# Patient Record
Sex: MALE | Race: WHITE | NOT HISPANIC OR LATINO | Employment: OTHER | URBAN - METROPOLITAN AREA
[De-identification: names, ages, dates, MRNs, and addresses within clinical notes are randomized per-mention and may not be internally consistent; named-entity substitution may affect disease eponyms.]

---

## 2019-09-06 ENCOUNTER — TELEPHONE (OUTPATIENT)
Dept: GASTROENTEROLOGY | Facility: CLINIC | Age: 74
End: 2019-09-06

## 2019-09-06 NOTE — TELEPHONE ENCOUNTER
CALLED AND LVM FOR PT TO BRING ANY RECORDS WITH HIM TO APT, OR TO CALL BACK IF HE KNOWS THE NAME OF HIS PREVIOUS GI SO WE CAN RECEIVE RECORDS

## 2019-09-09 ENCOUNTER — TELEPHONE (OUTPATIENT)
Dept: GASTROENTEROLOGY | Facility: AMBULARY SURGERY CENTER | Age: 74
End: 2019-09-09

## 2019-09-09 ENCOUNTER — CONSULT (OUTPATIENT)
Dept: GASTROENTEROLOGY | Facility: AMBULARY SURGERY CENTER | Age: 74
End: 2019-09-09
Payer: COMMERCIAL

## 2019-09-09 VITALS
HEIGHT: 68 IN | DIASTOLIC BLOOD PRESSURE: 60 MMHG | TEMPERATURE: 98.2 F | BODY MASS INDEX: 26.28 KG/M2 | RESPIRATION RATE: 18 BRPM | SYSTOLIC BLOOD PRESSURE: 116 MMHG | HEART RATE: 92 BPM | WEIGHT: 173.4 LBS

## 2019-09-09 DIAGNOSIS — K21.9 GASTROESOPHAGEAL REFLUX DISEASE, ESOPHAGITIS PRESENCE NOT SPECIFIED: ICD-10-CM

## 2019-09-09 DIAGNOSIS — K22.70 BARRETT'S ESOPHAGUS WITHOUT DYSPLASIA: Primary | ICD-10-CM

## 2019-09-09 PROCEDURE — 99204 OFFICE O/P NEW MOD 45 MIN: CPT | Performed by: INTERNAL MEDICINE

## 2019-09-09 RX ORDER — LEVOTHYROXINE SODIUM 175 UG/1
175 TABLET ORAL EVERY MORNING
COMMUNITY

## 2019-09-09 RX ORDER — LANOLIN ALCOHOL/MO/W.PET/CERES
CREAM (GRAM) TOPICAL DAILY
COMMUNITY
End: 2019-11-06 | Stop reason: ALTCHOICE

## 2019-09-09 RX ORDER — UREA 10 %
LOTION (ML) TOPICAL DAILY
COMMUNITY

## 2019-09-09 RX ORDER — MELATONIN
1000 DAILY
COMMUNITY
End: 2020-09-11

## 2019-09-09 RX ORDER — RANITIDINE 150 MG/1
150 CAPSULE ORAL 2 TIMES DAILY
COMMUNITY
End: 2019-11-06

## 2019-09-09 RX ORDER — LOSARTAN POTASSIUM 25 MG/1
12.5 TABLET ORAL EVERY MORNING
COMMUNITY

## 2019-09-09 NOTE — LETTER
September 9, 2019     Eduardo Souza, [address]  [facility] [address]    Patient: Ap Tony   YOB: 1945   Date of Visit: 9/9/2019       Dear Dr Josie Dhillon: Thank you for referring Ap Tony to me for evaluation  Below are my notes for this consultation  If you have questions, please do not hesitate to call me  I look forward to following your patient along with you  Sincerely,        Cristina Mendoza MD        CC: No Recipients  Cristina Mendoza MD  9/9/2019 11:02 AM  Sign at close encounter  Consultation - [facility]  Ap Tony 69 y o  male MRN: [id number]  Unit/Bed#:  Encounter: [id number]        Consults    ASSESSMENT/PLAN:       1  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 which was notable for Grimaldo's with atypia without definite dysplasia  -he is due for repeat EGD  If the repeat EGD is notable for dysplasia, will need RFA  -due to history of osteopenia, bone loss, patient can unfortunately not be on a PPI  Will continue Zantac 150 mg b i d  -continue to follow anti-reflux measures  - Patient was explained about the lifestyle and dietary modifications  Advised to avoid fatty foods, chocolates, caffeine, alcohol and any other triggering foods  Avoid eating for at least 3 hours before going to bed           ______________________________________________________________________    Reason for Consult / Principal Problem: [unfilled]    HPI: Ap Tony is a 69y o  year old male with history of Grimaldo's esophagus, GERD, bone density loss, scoliosis, is referred from the [place] for evaluation of Grimaldo's esophagus  Patient is currently taking Zantac 150 mg b i d   Patient reports having had an EGD in June of 2019 which was notable for C4M2 Grimaldo's a  Biopsies were notable for atypia without definite dysplasia  He was therefore recommended to follow-up with us    Patient states that he has been on Zantac 150 mg b i d  For the past 5 years, prior to that he had been on PPI for at least 10 years  He denies dysphagia, hematemesis, coffee-ground emesis or melena  No unintentional weight loss  Patient states that he is up-to-date with colonoscopy which was last year  Review of Systems: The remainder of the review of systems was negative except for the pertinent positives noted in HPI  Historical Information   Past Medical History:   Diagnosis Date    Grimaldo esophagus     Decreased bone density     Hypertension     Osteopenia     Scoliosis     Thyroid disease      Past Surgical History:   Procedure Laterality Date    THYROIDECTOMY       Social History   Social History     Substance and Sexual Activity   Alcohol Use Yes    Comment: 1 glass with dinner     Social History     Substance and Sexual Activity   Drug Use Never     Social History     Tobacco Use   Smoking Status Never Smoker   Smokeless Tobacco Never Used     History reviewed  No pertinent family history  Meds/Allergies       (Not in a hospital admission)  No current facility-administered medications for this visit  Allergies not on file    Objective     Blood pressure 116/60, pulse 92, temperature 98 2 °F (36 8 °C), temperature source Tympanic, resp  rate 18, height 5' 8" (1 727 m), weight 78 7 kg (173 lb 6 4 oz)  [unfilled]    PHYSICAL EXAM     GEN: well nourished, well developed, no acute distress  HEENT: anicteric, MMM, no cervical or supraclavicular lymphadenopathy  CV: RRR, no m/r/g  CHEST: CTA b/l, no WRR  ABD: +BS, soft, NT/ND, no hepatosplenomegaly  EXT: no c/c/e  SKIN: no rashes,  NEURO: aaox3    Lab Results:   No visits with results within 1 Day(s) from this visit  Latest known visit with results is:   No results found for any previous visit       Imaging Studies: I have personally reviewed pertinent films in PACS

## 2019-09-09 NOTE — PROGRESS NOTES
Consultation - [facility]  Suraj Grove 69 y o  male MRN: [id number]  Unit/Bed#:  Encounter: [id number]        Consults    ASSESSMENT/PLAN:       1  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 which was notable for Grimaldo's with atypia without definite dysplasia  -he is due for repeat EGD  If the repeat EGD is notable for dysplasia, will need RFA  -due to history of osteopenia, bone loss, patient can unfortunately not be on a PPI  Will continue Zantac 150 mg b i d  -continue to follow anti-reflux measures  - Patient was explained about the lifestyle and dietary modifications  Advised to avoid fatty foods, chocolates, caffeine, alcohol and any other triggering foods  Avoid eating for at least 3 hours before going to bed           ______________________________________________________________________    Reason for Consult / Principal Problem: [unfilled]    HPI: Suraj Grove is a 69y o  year old male with history of Grimaldo's esophagus, GERD, bone density loss, scoliosis, is referred from the [address] [place] [address] for evaluation of Grimaldo's esophagus  Patient is currently taking Zantac 150 mg b i d   Patient reports having had an EGD in June of 2019 which was notable for C4M2 Grimaldo's a  Biopsies were notable for atypia without definite dysplasia  He was therefore recommended to follow-up with us  Patient states that he has been on Zantac 150 mg b i d  For the past 5 years, prior to that he had been on PPI for at least 10 years  He denies dysphagia, hematemesis, coffee-ground emesis or melena  No unintentional weight loss  Patient states that he is up-to-date with colonoscopy which was last year  Review of Systems: The remainder of the review of systems was negative except for the pertinent positives noted in HPI       Historical Information   Past Medical History:   Diagnosis Date    Grimaldo esophagus     Decreased bone density     Hypertension     Osteopenia     Scoliosis     Thyroid disease      Past Surgical History:   Procedure Laterality Date    THYROIDECTOMY       Social History   Social History     Substance and Sexual Activity   Alcohol Use Yes    Comment: 1 glass with dinner     Social History     Substance and Sexual Activity   Drug Use Never     Social History     Tobacco Use   Smoking Status Never Smoker   Smokeless Tobacco Never Used     History reviewed  No pertinent family history  Meds/Allergies       (Not in a hospital admission)  No current facility-administered medications for this visit  Allergies not on file    Objective     Blood pressure 116/60, pulse 92, temperature 98 2 °F (36 8 °C), temperature source Tympanic, resp  rate 18, height 5' 8" (1 727 m), weight 78 7 kg (173 lb 6 4 oz)  [unfilled]    PHYSICAL EXAM     GEN: well nourished, well developed, no acute distress  HEENT: anicteric, MMM, no cervical or supraclavicular lymphadenopathy  CV: RRR, no m/r/g  CHEST: CTA b/l, no WRR  ABD: +BS, soft, NT/ND, no hepatosplenomegaly  EXT: no c/c/e  SKIN: no rashes,  NEURO: aaox3    Lab Results:   No visits with results within 1 Day(s) from this visit  Latest known visit with results is:   No results found for any previous visit       Imaging Studies: I have personally reviewed pertinent films in PACS

## 2019-10-23 ENCOUNTER — ANESTHESIA EVENT (OUTPATIENT)
Dept: GASTROENTEROLOGY | Facility: AMBULARY SURGERY CENTER | Age: 74
End: 2019-10-23

## 2019-11-05 NOTE — ANESTHESIA PREPROCEDURE EVALUATION
Review of Systems/Medical History  Patient summary reviewed  Chart reviewed  No history of anesthetic complications     Cardiovascular  Hypertension ,    Pulmonary  Smoker ex-smoker  , No recent URI ,        GI/Hepatic    GERD well controlled, Esophageal disease hamilton esophagus,             Endo/Other  History of thyroid disease , hypothyroidism,      GYN       Hematology   Musculoskeletal       Neurology  Negative neurology ROS      Psychology           Physical Exam    Airway    Mallampati score: I  TM Distance: >3 FB  Neck ROM: full     Dental   lower dentures and upper dentures,     Cardiovascular  Rhythm: regular,     Pulmonary  Breath sounds clear to auscultation,     Other Findings        Anesthesia Plan  ASA Score- 2     Anesthesia Type- IV sedation with anesthesia with ASA Monitors  Additional Monitors:   Airway Plan:         Plan Factors-    Induction- intravenous  Postoperative Plan-     Informed Consent- Anesthetic plan and risks discussed with patient  I personally reviewed this patient with the CRNA  Discussed and agreed on the Anesthesia Plan with the CRNA  Frank Deluca

## 2019-11-06 ENCOUNTER — HOSPITAL ENCOUNTER (OUTPATIENT)
Dept: GASTROENTEROLOGY | Facility: AMBULARY SURGERY CENTER | Age: 74
Setting detail: OUTPATIENT SURGERY
Discharge: HOME | End: 2019-11-06
Attending: INTERNAL MEDICINE
Payer: OTHER GOVERNMENT

## 2019-11-06 ENCOUNTER — TELEPHONE (OUTPATIENT)
Dept: GASTROENTEROLOGY | Facility: CLINIC | Age: 74
End: 2019-11-06

## 2019-11-06 ENCOUNTER — ANESTHESIA (OUTPATIENT)
Dept: GASTROENTEROLOGY | Facility: AMBULARY SURGERY CENTER | Age: 74
End: 2019-11-06

## 2019-11-06 VITALS
OXYGEN SATURATION: 98 % | DIASTOLIC BLOOD PRESSURE: 58 MMHG | BODY MASS INDEX: 26.07 KG/M2 | HEIGHT: 68 IN | RESPIRATION RATE: 18 BRPM | TEMPERATURE: 97 F | HEART RATE: 69 BPM | WEIGHT: 172 LBS | SYSTOLIC BLOOD PRESSURE: 103 MMHG

## 2019-11-06 DIAGNOSIS — K22.70 BARRETT'S ESOPHAGUS WITHOUT DYSPLASIA: ICD-10-CM

## 2019-11-06 DIAGNOSIS — K22.70 BARRETT'S ESOPHAGUS WITHOUT DYSPLASIA: Primary | ICD-10-CM

## 2019-11-06 PROCEDURE — 43239 EGD BIOPSY SINGLE/MULTIPLE: CPT | Performed by: INTERNAL MEDICINE

## 2019-11-06 PROCEDURE — 88305 TISSUE EXAM BY PATHOLOGIST: CPT | Performed by: PATHOLOGY

## 2019-11-06 RX ORDER — PROPOFOL 10 MG/ML
INJECTION, EMULSION INTRAVENOUS AS NEEDED
Status: DISCONTINUED | OUTPATIENT
Start: 2019-11-06 | End: 2019-11-06 | Stop reason: SURG

## 2019-11-06 RX ORDER — FAMOTIDINE 20 MG/1
20 TABLET, FILM COATED ORAL 2 TIMES DAILY
Qty: 60 TABLET | Refills: 11 | Status: SHIPPED | OUTPATIENT
Start: 2019-11-06

## 2019-11-06 RX ORDER — SODIUM CHLORIDE, SODIUM LACTATE, POTASSIUM CHLORIDE, CALCIUM CHLORIDE 600; 310; 30; 20 MG/100ML; MG/100ML; MG/100ML; MG/100ML
125 INJECTION, SOLUTION INTRAVENOUS CONTINUOUS
Status: DISCONTINUED | OUTPATIENT
Start: 2019-11-06 | End: 2019-11-10 | Stop reason: HOSPADM

## 2019-11-06 RX ORDER — OMEPRAZOLE 20 MG/1
20 CAPSULE, DELAYED RELEASE ORAL EVERY OTHER DAY
Qty: 30 CAPSULE | Refills: 5 | Status: SHIPPED | OUTPATIENT
Start: 2019-11-06 | End: 2020-09-14 | Stop reason: SDUPTHER

## 2019-11-06 RX ORDER — LIDOCAINE HYDROCHLORIDE 10 MG/ML
INJECTION, SOLUTION INFILTRATION; PERINEURAL AS NEEDED
Status: DISCONTINUED | OUTPATIENT
Start: 2019-11-06 | End: 2019-11-06 | Stop reason: SURG

## 2019-11-06 RX ADMIN — PROPOFOL 30 MG: 10 INJECTION, EMULSION INTRAVENOUS at 08:16

## 2019-11-06 RX ADMIN — SODIUM CHLORIDE, SODIUM LACTATE, POTASSIUM CHLORIDE, AND CALCIUM CHLORIDE 125 ML/HR: .6; .31; .03; .02 INJECTION, SOLUTION INTRAVENOUS at 07:45

## 2019-11-06 RX ADMIN — PROPOFOL 20 MG: 10 INJECTION, EMULSION INTRAVENOUS at 08:13

## 2019-11-06 RX ADMIN — PROPOFOL 100 MG: 10 INJECTION, EMULSION INTRAVENOUS at 08:08

## 2019-11-06 RX ADMIN — LIDOCAINE HYDROCHLORIDE 50 MG: 10 INJECTION, SOLUTION INFILTRATION; PERINEURAL at 08:08

## 2019-11-06 RX ADMIN — PROPOFOL 30 MG: 10 INJECTION, EMULSION INTRAVENOUS at 08:11

## 2019-11-06 NOTE — TELEPHONE ENCOUNTER
Patients GI provider:  Dr Shadi Alberts    Number to return call: [phone]    Reason for call: Pt calling requesting for a clarification on omeprazole and fax script to [place] Dr Sarahy Minor [phone]    Scheduled procedure/appointment date if applicable: Apt/procedure 11/6/2019

## 2019-11-06 NOTE — ANESTHESIA POSTPROCEDURE EVALUATION
Post-Op Assessment Note    CV Status:  Stable  Pain Score: 0    Pain management: adequate     Mental Status:  Alert and awake   Hydration Status:  Euvolemic   PONV Controlled:  Controlled   Airway Patency:  Patent   Post Op Vitals Reviewed: Yes      Staff: Anesthesiologist           /57 (11/06/19 0825)    Temp (!) 97 °F (36 1 °C) (11/06/19 0825)    Pulse (!) 54 (11/06/19 0825)   Resp 14 (11/06/19 0825)    SpO2 98 % (11/06/19 0825)

## 2019-11-06 NOTE — H&P
History and Physical - [facility]  Lupe Blount 69 y o  male MRN: [id number]    HPI: Lupe Blount is a 69y o  year old male who presents for evaluation of hamilton's  Review of Systems    Historical Information   Past Medical History:   Diagnosis Date    Hamilton esophagus     Colon polyp     Decreased bone density     Hypertension     Kyphosis     Osteopenia     Osteoporosis     Scoliosis     Seasonal allergies     Thyroid disease      Past Surgical History:   Procedure Laterality Date    COLONOSCOPY      COLONOSCOPY W/ POLYPECTOMY      THYROIDECTOMY      TONSILLECTOMY       Social History   Social History     Substance and Sexual Activity   Alcohol Use Yes    Alcohol/week: 7 0 standard drinks    Types: 7 Glasses of wine per week    Frequency: 2-4 times a month    Comment: 1 glass with dinner     Social History     Substance and Sexual Activity   Drug Use Yes    Frequency: 4 0 times per week    Types: Marijuana    Comment: smokes joints     Social History     Tobacco Use   Smoking Status Former Smoker    Types: Cigarettes    Last attempt to quit:     Years since quittin 8   Smokeless Tobacco Never Used     Family History   Problem Relation Age of Onset    Colon cancer Father        Meds/Allergies       (Not in a hospital admission)    No Known Allergies    Objective     /70   Pulse 62   Temp (!) 97 °F (36 1 °C) (Temporal)   Resp 20   Ht 5' 8" (1 727 m)   Wt 78 kg (172 lb)   SpO2 98%   BMI 26 15 kg/m²       PHYSICAL EXAM    Gen: NAD  CV: RRR  CHEST: Clear  ABD: soft, NT/ND  EXT: no edema  Neuro: AAO      ASSESSMENT/PLAN:  This is a 69y o  year old male here for evaluation of hamilton's  PLAN:   Procedure: EGD

## 2019-11-06 NOTE — TELEPHONE ENCOUNTER
Clarified doses with patient  He is take omeprazole 20mg every other day and famotidine 20mg BID  Prescriptions need to be printed and faxed to Dr Socrates Panchal [phone]   Please assist

## 2019-11-06 NOTE — ANESTHESIA POSTPROCEDURE EVALUATION
Post-Op Assessment Note    CV Status:  Stable    Pain management: adequate     Mental Status:  Alert and awake   Hydration Status:  Euvolemic   PONV Controlled:  Controlled   Airway Patency:  Patent   Post Op Vitals Reviewed: Yes      Staff: CRNA           /58 (11/06/19 0838)    Temp (!) 97 °F (36 1 °C) (11/06/19 0838)    Pulse 69 (11/06/19 0838)   Resp 18 (11/06/19 0838)    SpO2 98 % (11/06/19 6954)

## 2019-11-13 ENCOUNTER — TELEPHONE (OUTPATIENT)
Dept: GASTROENTEROLOGY | Facility: CLINIC | Age: 74
End: 2019-11-13

## 2019-11-13 NOTE — TELEPHONE ENCOUNTER
lmom asking patient to contact the office for results   Letter sent   EGD recall placed   Apt recall placed

## 2019-11-13 NOTE — LETTER
November 13, 2019     Dalton Minor  [phone]      Dear Mr Carolyn Luna: Our office has attempted to call you in regards to your results, however, we have been unable to get a hold of you  Please give our office a call so we can review your results and answer any questions you may have in regards to your recent EGD Biopsies                 Sincerely,        [facility]

## 2019-11-13 NOTE — TELEPHONE ENCOUNTER
----- Message from Beny Calzada MD sent at 11/12/2019  5:28 PM EST -----  Please call the patient and inform him that esophageal biopsies only show Grimaldo's esophagus  There is no evidence of dysplasia on any of the biopsies  Would recommend that he take PPI every other day alternating with H2 blocker which he was taking  He will need a repeat EGD in 6 months  Please have him follow-up in the office in the next 2-3 months

## 2019-11-21 ENCOUNTER — TELEPHONE (OUTPATIENT)
Dept: GASTROENTEROLOGY | Facility: AMBULARY SURGERY CENTER | Age: 74
End: 2019-11-21

## 2019-11-21 NOTE — TELEPHONE ENCOUNTER
Returned pt's call re: pt needs to schedule office appt due in Feb 2020-at that time repeat egd can be scheduled due May 2020 per result note

## 2020-01-22 ENCOUNTER — TELEPHONE (OUTPATIENT)
Dept: GASTROENTEROLOGY | Facility: AMBULARY SURGERY CENTER | Age: 75
End: 2020-01-22

## 2020-01-22 NOTE — TELEPHONE ENCOUNTER
Called patient, spoke with wife, Juanito Sesay  He has an appointment scheduled on 2/18 with Rosalinda Cabral at 10am and she is not doing appointments that day  Told her to have him call in to reschedule the visit

## 2020-02-05 ENCOUNTER — TELEPHONE (OUTPATIENT)
Dept: GASTROENTEROLOGY | Facility: AMBULARY SURGERY CENTER | Age: 75
End: 2020-02-05

## 2020-02-07 NOTE — TELEPHONE ENCOUNTER
Pt called, he is working on this, he has been in contact with [facility] and is just waiting to hear if extension is approved

## 2020-02-13 ENCOUNTER — OFFICE VISIT (OUTPATIENT)
Dept: GASTROENTEROLOGY | Facility: AMBULARY SURGERY CENTER | Age: 75
End: 2020-02-13
Payer: COMMERCIAL

## 2020-02-13 VITALS
TEMPERATURE: 98.9 F | SYSTOLIC BLOOD PRESSURE: 110 MMHG | WEIGHT: 174 LBS | HEART RATE: 70 BPM | DIASTOLIC BLOOD PRESSURE: 60 MMHG | RESPIRATION RATE: 16 BRPM | HEIGHT: 68 IN | BODY MASS INDEX: 26.37 KG/M2

## 2020-02-13 DIAGNOSIS — K22.70 BARRETT'S ESOPHAGUS WITHOUT DYSPLASIA: Primary | ICD-10-CM

## 2020-02-13 DIAGNOSIS — Z80.0 FAMILY HISTORY OF COLON CANCER IN FATHER: ICD-10-CM

## 2020-02-13 PROCEDURE — 99213 OFFICE O/P EST LOW 20 MIN: CPT | Performed by: PHYSICIAN ASSISTANT

## 2020-02-13 NOTE — PROGRESS NOTES
[facility] - Outpatient Follow-up Note  Josefina Sanches 69 y o  male MRN: [id number]  Encounter: [id number]    ASSESSMENT AND PLAN:      Barretts esophagus   -EGD in  showed nondysplastic barretts  -plan for repeat EGD 2020  -continue regimen of omeprazole every other day and famotidine    Family history of colon cancer  -due for recall   ______________________________________________________________________    SUBJECTIVE:  Pleasant 70 yo male with a history of GERD and  barretts esophagus presents for EGD follow up  EGD showed non dysplastic short segment barretts  He actually has never had significant gerd symptoms, and is doing well now he takes omeprazole 20mg every other day and famotidine daily  No dysphagia, abdominal pain    REVIEW OF SYSTEMS IS OTHERWISE NEGATIVE      Historical Information   Past Medical History:   Diagnosis Date    Grimaldo esophagus     Colon polyp     Decreased bone density     Hypertension     Kyphosis     Osteopenia     Osteoporosis     Scoliosis     Seasonal allergies     Thyroid disease      Past Surgical History:   Procedure Laterality Date    COLONOSCOPY      COLONOSCOPY W/ POLYPECTOMY      THYROIDECTOMY      TONSILLECTOMY       Social History   Social History     Substance and Sexual Activity   Alcohol Use Yes    Alcohol/week: 7 0 standard drinks    Types: 7 Glasses of wine per week    Frequency: 2-4 times a month    Comment: 1 glass with dinner     Social History     Substance and Sexual Activity   Drug Use Yes    Frequency: 4 0 times per week    Types: Marijuana    Comment: smokes joints     Social History     Tobacco Use   Smoking Status Former Smoker    Types: Cigarettes    Last attempt to quit:     Years since quittin 1   Smokeless Tobacco Never Used     Family History   Problem Relation Age of Onset    Colon cancer Father        Meds/Allergies       Current Outpatient Medications:     calcium carbonate (OS-JACOB) 1250 (500 Ca) MG chewable tablet    cholecalciferol (VITAMIN D3) 1,000 units tablet    Cyanocobalamin 1000 MCG/ML KIT    famotidine (PEPCID) 20 mg tablet    levothyroxine 175 mcg tablet    losartan (COZAAR) 25 mg tablet    omeprazole (PriLOSEC) 20 mg delayed release capsule    No Known Allergies        Objective     Blood pressure 110/60, pulse 70, temperature 98 9 °F (37 2 °C), temperature source Tympanic, resp  rate 16, height 5' 8" (1 727 m), weight 78 9 kg (174 lb)  Body mass index is 26 46 kg/m²  PHYSICAL EXAM:    General Appearance:   Alert, cooperative, no distress   HEENT:   Normocephalic, atraumatic, anicteric      Neck:  Supple, symmetrical, trachea midline   Lungs:   Clear to auscultation bilaterally; no rales, rhonchi or wheezing; respirations unlabored    Heart[de-identified]   Regular rate and rhythm; no murmur, rub, or gallop  Abdomen:   Soft, non-tender, non-distended; normal bowel sounds; no masses, no organomegaly                              Lab Results:   No visits with results within 1 Day(s) from this visit     Latest known visit with results is:   Hospital Outpatient Visit on 11/06/2019   Component Date Value    Case Report 11/06/2019                      Value:Surgical Pathology Report                         Case: [id number]                                   Authorizing Provider:  Trevon Orr MD       Collected:           11/06/2019 0810              Ordering Location[facility]     [facility]        Received:            11/06/2019 [facility]                                                           Pathologist:           Dmitry Hester DO                                                      Specimens:   A) - Esophagus, esophagus at 36 cm                                                                  B) - Esophagus, esophagus at 34 cm                                                                  C) - Esophagus, esophagus at 32 cm                                                                  D) - Esophagus, esophagus at 31 cm                                                         Final Diagnosis 11/06/2019                      Value: This result contains rich text formatting which cannot be displayed here   Note 11/06/2019                      Value: This result contains rich text formatting which cannot be displayed here   Additional Information 11/06/2019                      Value: This result contains rich text formatting which cannot be displayed here  [facility] Gross Description 11/06/2019                      Value: This result contains rich text formatting which cannot be displayed here  Radiology Results:   No results found

## 2020-03-30 ENCOUNTER — TELEPHONE (OUTPATIENT)
Dept: GASTROENTEROLOGY | Facility: CLINIC | Age: 75
End: 2020-03-30

## 2020-06-26 ENCOUNTER — PREP FOR PROCEDURE (OUTPATIENT)
Dept: GASTROENTEROLOGY | Facility: AMBULARY SURGERY CENTER | Age: 75
End: 2020-06-26

## 2020-06-26 DIAGNOSIS — Z80.0 FAMILY HISTORY OF COLON CANCER IN FATHER: Primary | ICD-10-CM

## 2020-06-26 DIAGNOSIS — Z20.822 ENCOUNTER FOR LABORATORY TESTING FOR COVID-19 VIRUS: Primary | ICD-10-CM

## 2020-06-30 DIAGNOSIS — Z80.0 FAMILY HISTORY OF COLON CANCER IN FATHER: ICD-10-CM

## 2020-07-01 DIAGNOSIS — Z20.822 ENCOUNTER FOR LABORATORY TESTING FOR COVID-19 VIRUS: ICD-10-CM

## 2020-07-01 PROCEDURE — U0003 INFECTIOUS AGENT DETECTION BY NUCLEIC ACID (DNA OR RNA); SEVERE ACUTE RESPIRATORY SYNDROME CORONAVIRUS 2 (SARS-COV-2) (CORONAVIRUS DISEASE [COVID-19]), AMPLIFIED PROBE TECHNIQUE, MAKING USE OF HIGH THROUGHPUT TECHNOLOGIES AS DESCRIBED BY CMS-2020-01-R: HCPCS | Performed by: OBSTETRICS & GYNECOLOGY

## 2020-07-06 ENCOUNTER — ANESTHESIA EVENT (OUTPATIENT)
Dept: GASTROENTEROLOGY | Facility: HOSPITAL | Age: 75
End: 2020-07-06

## 2020-07-06 ENCOUNTER — HOSPITAL ENCOUNTER (OUTPATIENT)
Dept: GASTROENTEROLOGY | Facility: HOSPITAL | Age: 75
Setting detail: OUTPATIENT SURGERY
Discharge: HOME | End: 2020-07-06
Attending: INTERNAL MEDICINE | Admitting: INTERNAL MEDICINE
Payer: COMMERCIAL

## 2020-07-06 ENCOUNTER — ANESTHESIA (OUTPATIENT)
Dept: GASTROENTEROLOGY | Facility: HOSPITAL | Age: 75
End: 2020-07-06

## 2020-07-06 VITALS
SYSTOLIC BLOOD PRESSURE: 112 MMHG | HEART RATE: 58 BPM | WEIGHT: 157 LBS | HEIGHT: 68 IN | TEMPERATURE: 96.2 F | BODY MASS INDEX: 23.79 KG/M2 | DIASTOLIC BLOOD PRESSURE: 57 MMHG | OXYGEN SATURATION: 100 % | RESPIRATION RATE: 17 BRPM

## 2020-07-06 DIAGNOSIS — K22.70 BARRETT'S ESOPHAGUS WITHOUT DYSPLASIA: ICD-10-CM

## 2020-07-06 DIAGNOSIS — Z80.0 FAMILY HISTORY OF COLON CANCER IN FATHER: ICD-10-CM

## 2020-07-06 DIAGNOSIS — K21.9 GASTROESOPHAGEAL REFLUX DISEASE, ESOPHAGITIS PRESENCE NOT SPECIFIED: ICD-10-CM

## 2020-07-06 PROCEDURE — 43239 EGD BIOPSY SINGLE/MULTIPLE: CPT | Performed by: INTERNAL MEDICINE

## 2020-07-06 PROCEDURE — 88341 IMHCHEM/IMCYTCHM EA ADD ANTB: CPT | Performed by: PATHOLOGY

## 2020-07-06 PROCEDURE — 88342 IMHCHEM/IMCYTCHM 1ST ANTB: CPT | Performed by: PATHOLOGY

## 2020-07-06 PROCEDURE — 45380 COLONOSCOPY AND BIOPSY: CPT | Performed by: INTERNAL MEDICINE

## 2020-07-06 PROCEDURE — NC001 PR NO CHARGE: Performed by: INTERNAL MEDICINE

## 2020-07-06 PROCEDURE — 88305 TISSUE EXAM BY PATHOLOGIST: CPT | Performed by: PATHOLOGY

## 2020-07-06 RX ORDER — FENTANYL CITRATE 50 UG/ML
INJECTION, SOLUTION INTRAMUSCULAR; INTRAVENOUS AS NEEDED
Status: DISCONTINUED | OUTPATIENT
Start: 2020-07-06 | End: 2020-07-06 | Stop reason: SURG

## 2020-07-06 RX ORDER — GLYCOPYRROLATE 0.2 MG/ML
INJECTION INTRAMUSCULAR; INTRAVENOUS AS NEEDED
Status: DISCONTINUED | OUTPATIENT
Start: 2020-07-06 | End: 2020-07-06 | Stop reason: SURG

## 2020-07-06 RX ORDER — LIDOCAINE HYDROCHLORIDE 10 MG/ML
INJECTION, SOLUTION EPIDURAL; INFILTRATION; INTRACAUDAL; PERINEURAL AS NEEDED
Status: DISCONTINUED | OUTPATIENT
Start: 2020-07-06 | End: 2020-07-06 | Stop reason: SURG

## 2020-07-06 RX ORDER — PROPOFOL 10 MG/ML
INJECTION, EMULSION INTRAVENOUS CONTINUOUS PRN
Status: DISCONTINUED | OUTPATIENT
Start: 2020-07-06 | End: 2020-07-06 | Stop reason: SURG

## 2020-07-06 RX ORDER — SODIUM CHLORIDE, SODIUM LACTATE, POTASSIUM CHLORIDE, CALCIUM CHLORIDE 600; 310; 30; 20 MG/100ML; MG/100ML; MG/100ML; MG/100ML
INJECTION, SOLUTION INTRAVENOUS CONTINUOUS PRN
Status: DISCONTINUED | OUTPATIENT
Start: 2020-07-06 | End: 2020-07-06 | Stop reason: SURG

## 2020-07-06 RX ORDER — SODIUM CHLORIDE, SODIUM LACTATE, POTASSIUM CHLORIDE, CALCIUM CHLORIDE 600; 310; 30; 20 MG/100ML; MG/100ML; MG/100ML; MG/100ML
100 INJECTION, SOLUTION INTRAVENOUS CONTINUOUS
Status: DISCONTINUED | OUTPATIENT
Start: 2020-07-06 | End: 2020-07-10 | Stop reason: HOSPADM

## 2020-07-06 RX ORDER — PROPOFOL 10 MG/ML
INJECTION, EMULSION INTRAVENOUS AS NEEDED
Status: DISCONTINUED | OUTPATIENT
Start: 2020-07-06 | End: 2020-07-06 | Stop reason: SURG

## 2020-07-06 RX ADMIN — PHENYLEPHRINE HYDROCHLORIDE 100 MCG: 10 INJECTION INTRAVENOUS at 09:35

## 2020-07-06 RX ADMIN — PHENYLEPHRINE HYDROCHLORIDE 100 MCG: 10 INJECTION INTRAVENOUS at 09:53

## 2020-07-06 RX ADMIN — PROPOFOL 50 MG: 10 INJECTION, EMULSION INTRAVENOUS at 09:05

## 2020-07-06 RX ADMIN — FENTANYL CITRATE 25 MCG: 50 INJECTION INTRAMUSCULAR; INTRAVENOUS at 09:05

## 2020-07-06 RX ADMIN — PROPOFOL 50 MG: 10 INJECTION, EMULSION INTRAVENOUS at 09:06

## 2020-07-06 RX ADMIN — LIDOCAINE HYDROCHLORIDE 100 MG: 10 INJECTION, SOLUTION EPIDURAL; INFILTRATION; INTRACAUDAL; PERINEURAL at 09:05

## 2020-07-06 RX ADMIN — PHENYLEPHRINE HYDROCHLORIDE 100 MCG: 10 INJECTION INTRAVENOUS at 09:17

## 2020-07-06 RX ADMIN — PHENYLEPHRINE HYDROCHLORIDE 100 MCG: 10 INJECTION INTRAVENOUS at 09:47

## 2020-07-06 RX ADMIN — PROPOFOL 100 MCG/KG/MIN: 10 INJECTION, EMULSION INTRAVENOUS at 09:05

## 2020-07-06 RX ADMIN — SODIUM CHLORIDE, SODIUM LACTATE, POTASSIUM CHLORIDE, AND CALCIUM CHLORIDE: .6; .31; .03; .02 INJECTION, SOLUTION INTRAVENOUS at 09:02

## 2020-07-06 RX ADMIN — PHENYLEPHRINE HYDROCHLORIDE 100 MCG: 10 INJECTION INTRAVENOUS at 09:27

## 2020-07-06 RX ADMIN — PHENYLEPHRINE HYDROCHLORIDE 100 MCG: 10 INJECTION INTRAVENOUS at 09:40

## 2020-07-06 RX ADMIN — GLYCOPYRROLATE 0.2 MG: 0.2 INJECTION, SOLUTION INTRAMUSCULAR; INTRAVENOUS at 09:05

## 2020-07-06 RX ADMIN — PHENYLEPHRINE HYDROCHLORIDE 100 MCG: 10 INJECTION INTRAVENOUS at 09:23

## 2020-07-06 NOTE — ANESTHESIA PREPROCEDURE EVALUATION
Review of Systems/Medical History  Patient summary reviewed  Chart reviewed  No history of anesthetic complications     Cardiovascular  Exercise tolerance (METS): >4,  Hypertension ,    Pulmonary  Smoker (Regular marijuana use) ,        GI/Hepatic    GERD well controlled, Esophageal disease hamilton esophagus,   Comment: 1 drink EtOH daily          Endo/Other  History of thyroid disease , hypothyroidism,      GYN       Hematology   Musculoskeletal       Neurology   Psychology           Physical Exam    Airway    Mallampati score: I  TM Distance: >3 FB  Neck ROM: full     Dental   upper dentures and lower dentures,     Cardiovascular      Pulmonary      Other Findings        Anesthesia Plan  ASA Score- 2     Anesthesia Type- IV sedation with anesthesia with ASA Monitors  Additional Monitors:   Airway Plan:         Plan Factors-    Induction- intravenous  Postoperative Plan-     Informed Consent- Anesthetic plan and risks discussed with patient  I personally reviewed this patient with the CRNA  Discussed and agreed on the Anesthesia Plan with the CRNA  Daniel Antonio

## 2020-07-06 NOTE — H&P
History and Physical - [facility]  Corinna Estrada 77 y o  male MRN: [id number]    HPI: Corinna Estrada is a 77y o  year old male who presents for evaluation of Grimaldo's and colon cancer screening  Review of Systems    Historical Information   Past Medical History:   Diagnosis Date    Grimaldo esophagus     Colon polyp     Decreased bone density     Disease of thyroid gland     Hyperlipidemia     Hypertension     Kyphosis     Osteopenia     Osteoporosis     Scoliosis     Seasonal allergies     Thyroid disease      Past Surgical History:   Procedure Laterality Date    COLONOSCOPY      COLONOSCOPY W/ POLYPECTOMY      THYROIDECTOMY      TONSILLECTOMY       Social History   Social History     Substance and Sexual Activity   Alcohol Use Yes    Alcohol/week: 7 0 standard drinks    Types: 7 Glasses of wine per week    Frequency: 2-4 times a month    Comment: 1 glass with dinner     Social History     Substance and Sexual Activity   Drug Use Yes    Frequency: 4 0 times per week    Types: Marijuana    Comment: smokes joints     Social History     Tobacco Use   Smoking Status Former Smoker    Types: Cigarettes    Last attempt to quit:     Years since quittin 5   Smokeless Tobacco Never Used     Family History   Problem Relation Age of Onset    Colon cancer Father        Meds/Allergies       (Not in a hospital admission)    No Known Allergies    Objective     /76   Pulse 68   Temp (!) 97 2 °F (36 2 °C) (Temporal)   Resp 20   Ht 5' 8" (1 727 m)   Wt 71 2 kg (157 lb)   SpO2 94%   BMI 23 87 kg/m²       PHYSICAL EXAM    Gen: NAD  CV: RRR  CHEST: Clear  ABD: soft, NT/ND  EXT: no edema  Neuro: AAO      ASSESSMENT/PLAN:  This is a 77y o  year old male here for Grimaldo's and colon cancer screening  PLAN:   Procedure:  EGD and colonoscopy

## 2020-07-06 NOTE — ANESTHESIA POSTPROCEDURE EVALUATION
Post-Op Assessment Note    CV Status:  Stable  Pain Score: 0    Pain management: adequate     Mental Status:  Sleepy and arousable   Hydration Status:  Euvolemic   PONV Controlled:  Controlled   Airway Patency:  Patent and adequate   Post Op Vitals Reviewed: Yes      Staff: CRNA, Anesthesiologist           BP   82/49   Temp  96 1   Pulse  56   Resp   18   SpO2   100%

## 2020-07-08 LAB — SARS-COV-2 RNA SPEC QL NAA+PROBE: NOT DETECTED

## 2020-08-06 ENCOUNTER — TELEPHONE (OUTPATIENT)
Dept: GASTROENTEROLOGY | Facility: AMBULARY SURGERY CENTER | Age: 75
End: 2020-08-06

## 2020-08-06 NOTE — RESULT ENCOUNTER NOTE
Len-I called this pt and documented a phone encounter-I had to leave a message for him to return my call  This pt will need to be scheduled w/ dr Italo Paul (dr camp does not do RFA's yet) @ [facility]   FYI

## 2020-08-06 NOTE — TELEPHONE ENCOUNTER
Lm for pt to call back re: scheduling RFA (needs to be scheduled w/ dr Leida Carmona) @ [place] [phone]

## 2020-08-07 ENCOUNTER — TELEPHONE (OUTPATIENT)
Dept: GASTROENTEROLOGY | Facility: AMBULARY SURGERY CENTER | Age: 75
End: 2020-08-07

## 2020-08-07 NOTE — TELEPHONE ENCOUNTER
Spoke to pt   Pt is scheduled w/ dr Wilmer Morrow at [place] [address] for egd w/ RFA/Ketty from [employer] aware/COVID TEST ORDERED FOR 9/8/2020/PT AWARE

## 2020-09-08 DIAGNOSIS — Z11.59 SCREENING FOR VIRAL DISEASE: ICD-10-CM

## 2020-09-08 PROCEDURE — U0003 INFECTIOUS AGENT DETECTION BY NUCLEIC ACID (DNA OR RNA); SEVERE ACUTE RESPIRATORY SYNDROME CORONAVIRUS 2 (SARS-COV-2) (CORONAVIRUS DISEASE [COVID-19]), AMPLIFIED PROBE TECHNIQUE, MAKING USE OF HIGH THROUGHPUT TECHNOLOGIES AS DESCRIBED BY CMS-2020-01-R: HCPCS | Performed by: INTERNAL MEDICINE

## 2020-09-09 LAB — SARS-COV-2 RNA SPEC QL NAA+PROBE: NOT DETECTED

## 2020-09-11 RX ORDER — SILDENAFIL 100 MG/1
TABLET, FILM COATED ORAL AS NEEDED
COMMUNITY
Start: 2020-08-20

## 2020-09-11 RX ORDER — MULTIVIT-MIN/IRON/FOLIC ACID/K 18-600-40
2000 CAPSULE ORAL 2 TIMES DAILY
COMMUNITY

## 2020-09-11 NOTE — PRE-PROCEDURE INSTRUCTIONS
Pre-Surgery Instructions:   Medication Instructions    ATORVASTATIN CALCIUM PO Patient was instructed by Physician and understands   calcium carbonate (OS-JACOB) 1250 (500 Ca) MG chewable tablet Patient was instructed by Physician and understands   Cholecalciferol (Vitamin D) 50 MCG (2000 UT) CAPS Patient was instructed by Physician and understands   famotidine (PEPCID) 20 mg tablet Patient was instructed by Physician and understands   levothyroxine 175 mcg tablet Patient was instructed by Physician and understands   losartan (COZAAR) 25 mg tablet Patient was instructed by Physician and understands   omeprazole (PriLOSEC) 20 mg delayed release capsule Patient was instructed by Physician and understands   sildenafil (VIAGRA) 100 mg tablet Patient was instructed by Physician and understands  Pt to follow Dr Glory Vincent instructions  Pt will take his medications when he gets home   wife Eugenia Kong

## 2020-09-14 ENCOUNTER — ANESTHESIA EVENT (OUTPATIENT)
Dept: GASTROENTEROLOGY | Facility: AMBULARY SURGERY CENTER | Age: 75
End: 2020-09-14

## 2020-09-14 ENCOUNTER — HOSPITAL ENCOUNTER (OUTPATIENT)
Dept: GASTROENTEROLOGY | Facility: AMBULARY SURGERY CENTER | Age: 75
Setting detail: OUTPATIENT SURGERY
Discharge: HOME | End: 2020-09-14
Attending: INTERNAL MEDICINE
Payer: COMMERCIAL

## 2020-09-14 ENCOUNTER — ANESTHESIA (OUTPATIENT)
Dept: GASTROENTEROLOGY | Facility: AMBULARY SURGERY CENTER | Age: 75
End: 2020-09-14

## 2020-09-14 VITALS
RESPIRATION RATE: 18 BRPM | OXYGEN SATURATION: 99 % | DIASTOLIC BLOOD PRESSURE: 68 MMHG | HEART RATE: 50 BPM | TEMPERATURE: 95.7 F | WEIGHT: 157 LBS | BODY MASS INDEX: 23.79 KG/M2 | HEIGHT: 68 IN | SYSTOLIC BLOOD PRESSURE: 140 MMHG

## 2020-09-14 VITALS — HEART RATE: 63 BPM

## 2020-09-14 DIAGNOSIS — K21.9 GASTROESOPHAGEAL REFLUX DISEASE, ESOPHAGITIS PRESENCE NOT SPECIFIED: ICD-10-CM

## 2020-09-14 DIAGNOSIS — K22.70 BARRETT'S ESOPHAGUS WITHOUT DYSPLASIA: ICD-10-CM

## 2020-09-14 PROBLEM — I10 HTN (HYPERTENSION): Status: ACTIVE | Noted: 2020-09-14

## 2020-09-14 PROBLEM — E78.5 HYPERLIPIDEMIA: Status: ACTIVE | Noted: 2020-09-14

## 2020-09-14 PROCEDURE — 43270 EGD LESION ABLATION: CPT | Performed by: INTERNAL MEDICINE

## 2020-09-14 PROCEDURE — C1726 CATH, BAL DIL, NON-VASCULAR: HCPCS

## 2020-09-14 PROCEDURE — C1769 GUIDE WIRE: HCPCS

## 2020-09-14 RX ORDER — OMEPRAZOLE 20 MG/1
40 CAPSULE, DELAYED RELEASE ORAL 2 TIMES DAILY
Qty: 60 CAPSULE | Refills: 5 | Status: SHIPPED | OUTPATIENT
Start: 2020-09-14

## 2020-09-14 RX ORDER — PROPOFOL 10 MG/ML
INJECTION, EMULSION INTRAVENOUS CONTINUOUS PRN
Status: DISCONTINUED | OUTPATIENT
Start: 2020-09-14 | End: 2020-09-14

## 2020-09-14 RX ORDER — LIDOCAINE HYDROCHLORIDE 10 MG/ML
INJECTION, SOLUTION EPIDURAL; INFILTRATION; INTRACAUDAL; PERINEURAL AS NEEDED
Status: DISCONTINUED | OUTPATIENT
Start: 2020-09-14 | End: 2020-09-14

## 2020-09-14 RX ORDER — SUCRALFATE ORAL 1 G/10ML
1 SUSPENSION ORAL 4 TIMES DAILY
Qty: 420 ML | Refills: 0 | Status: SHIPPED | OUTPATIENT
Start: 2020-09-14 | End: 2021-11-17

## 2020-09-14 RX ORDER — LIDOCAINE HYDROCHLORIDE 20 MG/ML
15 SOLUTION OROPHARYNGEAL 4 TIMES DAILY PRN
Qty: 300 ML | Refills: 3 | Status: SHIPPED | OUTPATIENT
Start: 2020-09-14 | End: 2021-11-17

## 2020-09-14 RX ORDER — FENTANYL CITRATE 50 UG/ML
INJECTION, SOLUTION INTRAMUSCULAR; INTRAVENOUS AS NEEDED
Status: DISCONTINUED | OUTPATIENT
Start: 2020-09-14 | End: 2020-09-14

## 2020-09-14 RX ORDER — SODIUM CHLORIDE, SODIUM LACTATE, POTASSIUM CHLORIDE, CALCIUM CHLORIDE 600; 310; 30; 20 MG/100ML; MG/100ML; MG/100ML; MG/100ML
125 INJECTION, SOLUTION INTRAVENOUS CONTINUOUS
Status: CANCELLED | OUTPATIENT
Start: 2020-09-14

## 2020-09-14 RX ORDER — SODIUM CHLORIDE, SODIUM LACTATE, POTASSIUM CHLORIDE, CALCIUM CHLORIDE 600; 310; 30; 20 MG/100ML; MG/100ML; MG/100ML; MG/100ML
INJECTION, SOLUTION INTRAVENOUS CONTINUOUS PRN
Status: DISCONTINUED | OUTPATIENT
Start: 2020-09-14 | End: 2020-09-14

## 2020-09-14 RX ORDER — PROPOFOL 10 MG/ML
INJECTION, EMULSION INTRAVENOUS AS NEEDED
Status: DISCONTINUED | OUTPATIENT
Start: 2020-09-14 | End: 2020-09-14

## 2020-09-14 RX ADMIN — FENTANYL CITRATE 25 MCG: 50 INJECTION, SOLUTION INTRAMUSCULAR; INTRAVENOUS at 09:19

## 2020-09-14 RX ADMIN — SODIUM CHLORIDE, SODIUM LACTATE, POTASSIUM CHLORIDE, AND CALCIUM CHLORIDE: .6; .31; .03; .02 INJECTION, SOLUTION INTRAVENOUS at 08:50

## 2020-09-14 RX ADMIN — PROPOFOL 100 MG: 10 INJECTION, EMULSION INTRAVENOUS at 08:54

## 2020-09-14 RX ADMIN — LIDOCAINE HYDROCHLORIDE 50 MG: 10 INJECTION, SOLUTION EPIDURAL; INFILTRATION; INTRACAUDAL; PERINEURAL at 08:54

## 2020-09-14 RX ADMIN — FENTANYL CITRATE 25 MCG: 50 INJECTION, SOLUTION INTRAMUSCULAR; INTRAVENOUS at 09:00

## 2020-09-14 RX ADMIN — PROPOFOL 160 MCG/KG/MIN: 10 INJECTION, EMULSION INTRAVENOUS at 08:54

## 2020-09-14 NOTE — ANESTHESIA PREPROCEDURE EVALUATION
Procedure:  EGD    Relevant Problems   CARDIO   (+) HTN (hypertension)   (+) Hyperlipidemia      Other   (+) Grimaldo's esophagus without dysplasia        Physical Exam    Airway    Mallampati score: II  TM Distance: >3 FB  Neck ROM: full     Dental   upper dentures and lower dentures,     Cardiovascular  Rhythm: regular, Rate: normal, Cardiovascular exam normal    Pulmonary  Pulmonary exam normal     Other Findings        Anesthesia Plan  ASA Score- 2     Anesthesia Type- IV sedation with anesthesia with ASA Monitors  Additional Monitors:   Airway Plan:           Plan Factors-Exercise tolerance (METS): >4 METS  Chart reviewed  Patient summary reviewed  Patient is not a current smoker  Patient did not smoke on day of surgery  Induction- intravenous  Postoperative Plan-     Informed Consent- Anesthetic plan and risks discussed with patient  I personally reviewed this patient with the CRNA  Discussed and agreed on the Anesthesia Plan with the CRNA  Rosalino Roblero

## 2020-09-14 NOTE — H&P
History and Physical -  [facility]  Florian Galindo 77 y o  male MRN: [id number]    HPI: Florian Galindo is a 77y o  year old male who presents with long segment barretts with indeterminate dysplasia       Review of Systems    Historical Information   Past Medical History:   Diagnosis Date    Grimaldo esophagus     Colon polyp     Decreased bone density     Disease of thyroid gland     hyperthyroid-goiter-thyroidectomy    Erectile dysfunction     Full dentures     Hyperlipidemia     Hypertension     Kyphosis     Osteopenia     Osteoporosis     Scoliosis     Seasonal allergies     Spondylosis     Wears glasses      Past Surgical History:   Procedure Laterality Date    COLONOSCOPY      PARATHYROID GLAND SURGERY      THYROIDECTOMY      TONSILLECTOMY       Social History   Social History     Substance and Sexual Activity   Alcohol Use Yes    Alcohol/week: 7 0 standard drinks    Types: 7 Glasses of wine per week    Frequency: 4 or more times a week    Comment: 1 glass with dinner     Social History     Substance and Sexual Activity   Drug Use Yes    Frequency: 4 0 times per week    Types: Marijuana    Comment: smokes joints     Social History     Tobacco Use   Smoking Status Former Smoker    Types: Cigarettes    Last attempt to quit:     Years since quittin 7   Smokeless Tobacco Never Used     Family History   Problem Relation Age of Onset    Colon cancer Father     Cancer Father         colon    Heart disease Father     Thyroid disease Mother         goiter    Other Sister         something like "alzheimers"    Cancer Paternal Aunt         colon    Thyroid disease Sister        Meds/Allergies     (Not in a hospital admission)      No Known Allergies    Objective     /70   Pulse 58   Temp (!) 95 7 °F (35 4 °C) (Tympanic)   Resp 18   Ht 5' 8" (1 727 m)   Wt 71 2 kg (157 lb)   SpO2 100%   BMI 23 87 kg/m²       PHYSICAL EXAM    Gen: NAD  CV: RRR  CHEST: Clear  ABD: soft, NT/ND  EXT: no edema  Neuro: AAO      ASSESSMENT/PLAN:  This is a 77y o  year old male here for  long segment barretts with indeterminate dysplasia         PLAN:   Procedure: Seven Franco

## 2020-09-14 NOTE — ANESTHESIA POSTPROCEDURE EVALUATION
Post-Op Assessment Note    CV Status:  Stable  Pain Score: 0    Pain management: adequate     Mental Status:  Alert and awake   Hydration Status:  Stable and euvolemic   PONV Controlled:  Controlled   Airway Patency:  Patent and adequate      Post Op Vitals Reviewed: Yes      Staff: CRNA         No complications documented      BP   118/59   Temp     Pulse  58   Resp   20   SpO2   99

## 2021-02-13 ENCOUNTER — IMMUNIZATIONS (OUTPATIENT)
Dept: FAMILY MEDICINE CLINIC | Facility: HOSPITAL | Age: 76
End: 2021-02-13

## 2021-02-13 DIAGNOSIS — Z23 ENCOUNTER FOR IMMUNIZATION: Primary | ICD-10-CM

## 2021-02-13 PROCEDURE — 0001A SARS-COV-2 / COVID-19 MRNA VACCINE (PFIZER-BIONTECH) 30 MCG: CPT

## 2021-02-13 PROCEDURE — 91300 SARS-COV-2 / COVID-19 MRNA VACCINE (PFIZER-BIONTECH) 30 MCG: CPT

## 2021-03-06 ENCOUNTER — IMMUNIZATIONS (OUTPATIENT)
Dept: FAMILY MEDICINE CLINIC | Facility: HOSPITAL | Age: 76
End: 2021-03-06

## 2021-03-06 DIAGNOSIS — Z23 ENCOUNTER FOR IMMUNIZATION: Primary | ICD-10-CM

## 2021-03-06 PROCEDURE — 0002A SARS-COV-2 / COVID-19 MRNA VACCINE (PFIZER-BIONTECH) 30 MCG: CPT

## 2021-03-06 PROCEDURE — 91300 SARS-COV-2 / COVID-19 MRNA VACCINE (PFIZER-BIONTECH) 30 MCG: CPT

## 2021-08-09 ENCOUNTER — TELEPHONE (OUTPATIENT)
Dept: GASTROENTEROLOGY | Facility: AMBULARY SURGERY CENTER | Age: 76
End: 2021-08-09

## 2021-08-09 ENCOUNTER — TELEPHONE (OUTPATIENT)
Dept: GASTROENTEROLOGY | Facility: CLINIC | Age: 76
End: 2021-08-09

## 2021-08-09 NOTE — TELEPHONE ENCOUNTER
Patients GI provider:  Dr Steve Menjivar    Number to return call: [phone]    Reason for call: [facility]      Scheduled procedure/appointment date if applicable: N/A

## 2021-09-16 ENCOUNTER — OFFICE VISIT (OUTPATIENT)
Dept: GASTROENTEROLOGY | Facility: CLINIC | Age: 76
End: 2021-09-16
Payer: OTHER GOVERNMENT

## 2021-09-16 VITALS
SYSTOLIC BLOOD PRESSURE: 134 MMHG | BODY MASS INDEX: 24.58 KG/M2 | HEIGHT: 68 IN | HEART RATE: 66 BPM | WEIGHT: 162.2 LBS | DIASTOLIC BLOOD PRESSURE: 74 MMHG | TEMPERATURE: 97.2 F

## 2021-09-16 DIAGNOSIS — K22.70 LONG-SEGMENT BARRETT'S ESOPHAGUS: Primary | ICD-10-CM

## 2021-09-16 PROCEDURE — 99214 OFFICE O/P EST MOD 30 MIN: CPT | Performed by: PHYSICIAN ASSISTANT

## 2021-09-16 NOTE — PROGRESS NOTES
Follow-up Note -  [facility]  Ap Tony 1945 77 y o  male         Reason:   Follow-up; Grimaldo's esophagus    HPI:   52-year-old male with history of osteoporosis, hypertension and hyperlipidemia who presents for follow-up; he last saw our service when he underwent EGD with ablation of Grimaldo's 9/2020  He was recommended for repeat ablation in 3 months, but it appears he did not follow up  Last colonoscopy was in July 2020 showing 4 diminutive colon polyps for which he was recommended repeat colonoscopy in 3 years  (  He also has family history of colon cancer)  Grimaldo's was last biopsied at the time of EGD from July 2020, noting a Z-line about 7 centimeters from top of the gastric folds, for columnar tongue is were noted  Biopsies were done and 1 biopsy was indefinite for dysplasia, with the rest consistent for Grimaldo's without dysplasia  patient reports he is not having any heartburn, although he says he never really did have heartburn; he said that with his reflux he would get sinus congestion  He continues to take omeprazole 40 mg twice daily and also Pepcid twice daily  He does report history of osteoporosis  He denies any dysphagia, denies any particularly recent loss of weight although he thinks his weight has generally trended down over the last several years  Denies any nausea or vomiting, denies any concerning changes to his bowel habits      REVIEW OF SYSTEMS:      CONSTITUTIONAL: Denies any fever, chills, or rigors  Good appetite, and no recent weight loss  HEENT: No earache or tinnitus  Denies hearing loss or visual disturbances  CARDIOVASCULAR: No chest pain or palpitations  RESPIRATORY: Denies any cough, hemoptysis, shortness of breath or dyspnea on exertion  GASTROINTESTINAL: As noted in the History of Present Illness  GENITOURINARY: No problems with urination  Denies any hematuria or dysuria    NEUROLOGIC: No dizziness or vertigo, denies headaches  MUSCULOSKELETAL: Denies any muscle or joint pain  SKIN: Denies skin rashes or itching  ENDOCRINE: Denies excessive thirst  Denies intolerance to heat or cold  PSYCHOSOCIAL: Denies depression or anxiety  Denies any recent memory loss  Past Medical History:   Diagnosis Date    Grimaldo esophagus     Colon polyp     Decreased bone density     Disease of thyroid gland     hyperthyroid-goiter-thyroidectomy    Erectile dysfunction     Full dentures     Hyperlipidemia     Hypertension     Kyphosis     Osteopenia     Osteoporosis     Scoliosis     Seasonal allergies     Spondylosis     Wears glasses       Past Surgical History:   Procedure Laterality Date    COLONOSCOPY      PARATHYROID GLAND SURGERY      THYROIDECTOMY      TONSILLECTOMY       Social History     Socioeconomic History    Marital status: /Civil Union     Spouse name: Not on file    Number of children: Not on file    Years of education: Not on file    Highest education level: Not on file   Occupational History    Not on file   Tobacco Use    Smoking status: Former Smoker     Types: Cigarettes     Quit date:      Years since quittin     Smokeless tobacco: Never Used   Substance and Sexual Activity    Alcohol use: Yes     Alcohol/week: 7 0 standard drinks     Types: 7 Glasses of wine per week     Comment: 1 glass with dinner    Drug use: Yes     Frequency: 4 0 times per week     Types: Marijuana     Comment: smokes joints    Sexual activity: Not on file   Other Topics Concern    Not on file   Social History Narrative    Not on file     Social Determinants of Health     Financial Resource Strain:     Difficulty of Paying Living Expenses:    Food Insecurity:     Worried About Running Out of Food in the Last Year:     Ran Out of Food in the Last Year:    Transportation Needs:     Lack of Transportation (Medical):      Lack of Transportation (Non-Medical):    Physical Activity:     Days of Exercise per Week:     Minutes of Exercise per Session:    Stress:     Feeling of Stress :    Social Connections:     Frequency of Communication with Friends and Family:     Frequency of Social Gatherings with Friends and Family:     Attends Mu-ism Services:     Active Member of Clubs or Organizations:     Attends Club or Organization Meetings:     Marital Status:    Intimate Partner Violence:     Fear of Current or Ex-Partner:     Emotionally Abused:     Physically Abused:     Sexually Abused:      Family History   Problem Relation Age of Onset    Colon cancer Father     Cancer Father         colon    Heart disease Father     Thyroid disease Mother         goiter    Other Sister         something like "alzheimers"    Cancer Paternal Aunt         colon    Thyroid disease Sister      Patient has no known allergies    Current Outpatient Medications   Medication Sig Dispense Refill    ATORVASTATIN CALCIUM PO Take by mouth 1/2 tab daily      calcium carbonate (OS-JACOB) 1250 (500 Ca) MG chewable tablet Chew daily      Cholecalciferol (Vitamin D) 50 MCG (2000 UT) CAPS Take 2,000 Units by mouth 2 (two) times a day      Cyanocobalamin 1000 MCG/ML KIT Inject as directed every 14 (fourteen) days      famotidine (PEPCID) 20 mg tablet Take 1 tablet (20 mg total) by mouth 2 (two) times a day 60 tablet 11    levothyroxine 175 mcg tablet Take 175 mcg by mouth every morning       Lidocaine Viscous HCl (XYLOCAINE) 2 % mucosal solution Swish and spit 15 mL 4 (four) times a day as needed for mouth pain or discomfort 300 mL 3    losartan (COZAAR) 25 mg tablet Take 12 5 mg by mouth every morning       omeprazole (PriLOSEC) 20 mg delayed release capsule Take 2 capsules (40 mg total) by mouth 2 (two) times a day 60 capsule 5    sildenafil (VIAGRA) 100 mg tablet as needed      sucralfate (CARAFATE) 1 g/10 mL suspension Take 10 mL (1 g total) by mouth 4 (four) times a day (Patient not taking: Reported on 9/16/2021) 420 mL 0     No current facility-administered medications for this visit  Blood pressure 134/74, pulse 66, temperature (!) 97 2 °F (36 2 °C), temperature source Temporal, height 5' 8" (1 727 m), weight 73 6 kg (162 lb 3 2 oz)  PHYSICAL EXAM:      General Appearance:   Alert, cooperative, no distress, appears stated age    HEENT:   Normocephalic, atraumatic, anicteric      Neck:  Supple, symmetrical, trachea midline, no adenopathy;    thyroid: no enlargement/tenderness/nodules; no carotid  bruit or JVD    Lungs:   Clear to auscultation bilaterally; no rales, rhonchi or wheezing; respirations unlabored    Heart[de-identified]   S1 and S2 normal; regular rate and rhythm; no murmur, rub, or gallop  Abdomen:   Soft, non-tender, non-distended; normal bowel sounds; no masses, no organomegaly    Extremities: No edema, erythema, wounds, rashes   Rectal:   Deferred                      No results found for: WBC, HGB, HCT, MCV, PLT  No results found for: GLUCOSE, CALCIUM, NA, K, CO2, CL, BUN, CREATININE  No results found for: ALT, AST, GGT, ALKPHOS, BILITOT  No results found for: INR, PROTIME    EGD Barrx/RFA    Result Date: 9/14/2020  Impression: Grimaldo's esophagus ablated using ablation balloon  Ten joules in 2 applications  Relations for application  RECOMMENDATION: Discharged home Recommend full liquid diet today, followed dietary recommendations as per protocol Recommend omeprazole 40 mg twice daily, Carafate 4 times daily before meals for the next 2 weeks Viscous lidocaine as needed for pain Liquid codeine as needed for pain Repeat ablation in 3 months Call with any abdominal pain, bleeding, fevers  Kumar Yeh MD      ASSESSMENT & PLAN:    Long-segment Grimaldo's esophagus   No alarm symptoms at this time, the patient had long segment Grimaldo's esophagus with 1 biopsy that was indefinite for dysplasia, other biopsies showed nondysplastic Grimaldo's    He underwent a single ablation treatment in September 2020, was recommended for follow-up in 3 months from that time but reports he had difficulty following up after that point and does has not had any repeat treatments      - discussed with Dr Nathalia Mckeon, will plan for EGD with RFA     - procedure was explained in detail to the patient at this time including associated risks and benefits, risks including but not limited to infection, perforation bleeding     -continue acid reducers at current dosing in the meantime     -advised patient regarding dietary lifestyle modification strategies for the mitigation of GERD

## 2021-09-16 NOTE — ASSESSMENT & PLAN NOTE
No alarm symptoms at this time, the patient had long segment Grimaldo's esophagus with 1 biopsy that was indefinite for dysplasia, other biopsies showed nondysplastic Grimaldo's  He underwent a single ablation treatment in September 2020, was recommended for follow-up in 3 months from that time but reports he had difficulty following up after that point and does has not had any repeat treatments      - discussed with Dr Inga Vasquez, will plan for EGD with RFA     - procedure was explained in detail to the patient at this time including associated risks and benefits, risks including but not limited to infection, perforation bleeding     -continue acid reducers at current dosing in the meantime     -advised patient regarding dietary lifestyle modification strategies for the mitigation of GERD

## 2021-11-12 ENCOUNTER — TELEPHONE (OUTPATIENT)
Dept: PREADMISSION TESTING | Facility: HOSPITAL | Age: 76
End: 2021-11-12

## 2021-11-24 ENCOUNTER — HOSPITAL ENCOUNTER (OUTPATIENT)
Dept: GASTROENTEROLOGY | Facility: AMBULARY SURGERY CENTER | Age: 76
Setting detail: OUTPATIENT SURGERY
Discharge: HOME | End: 2021-11-24
Attending: INTERNAL MEDICINE | Admitting: INTERNAL MEDICINE
Payer: COMMERCIAL

## 2021-11-24 ENCOUNTER — ANESTHESIA (OUTPATIENT)
Dept: GASTROENTEROLOGY | Facility: AMBULARY SURGERY CENTER | Age: 76
End: 2021-11-24

## 2021-11-24 ENCOUNTER — ANESTHESIA EVENT (OUTPATIENT)
Dept: GASTROENTEROLOGY | Facility: AMBULARY SURGERY CENTER | Age: 76
End: 2021-11-24

## 2021-11-24 VITALS
HEIGHT: 68 IN | BODY MASS INDEX: 24.66 KG/M2 | OXYGEN SATURATION: 99 % | RESPIRATION RATE: 20 BRPM | SYSTOLIC BLOOD PRESSURE: 132 MMHG | DIASTOLIC BLOOD PRESSURE: 68 MMHG | HEART RATE: 66 BPM | TEMPERATURE: 96 F

## 2021-11-24 DIAGNOSIS — K22.70 LONG-SEGMENT BARRETT'S ESOPHAGUS: ICD-10-CM

## 2021-11-24 PROCEDURE — 43270 EGD LESION ABLATION: CPT | Performed by: INTERNAL MEDICINE

## 2021-11-24 RX ORDER — ONDANSETRON 2 MG/ML
4 INJECTION INTRAMUSCULAR; INTRAVENOUS ONCE AS NEEDED
Status: CANCELLED | OUTPATIENT
Start: 2021-11-24

## 2021-11-24 RX ORDER — LIDOCAINE HYDROCHLORIDE 20 MG/ML
INJECTION, SOLUTION EPIDURAL; INFILTRATION; INTRACAUDAL; PERINEURAL AS NEEDED
Status: DISCONTINUED | OUTPATIENT
Start: 2021-11-24 | End: 2021-11-24

## 2021-11-24 RX ORDER — PROPOFOL 10 MG/ML
INJECTION, EMULSION INTRAVENOUS AS NEEDED
Status: DISCONTINUED | OUTPATIENT
Start: 2021-11-24 | End: 2021-11-24

## 2021-11-24 RX ORDER — GLYCOPYRROLATE 0.2 MG/ML
INJECTION INTRAMUSCULAR; INTRAVENOUS AS NEEDED
Status: DISCONTINUED | OUTPATIENT
Start: 2021-11-24 | End: 2021-11-24

## 2021-11-24 RX ORDER — SODIUM CHLORIDE, SODIUM LACTATE, POTASSIUM CHLORIDE, CALCIUM CHLORIDE 600; 310; 30; 20 MG/100ML; MG/100ML; MG/100ML; MG/100ML
100 INJECTION, SOLUTION INTRAVENOUS CONTINUOUS
Status: DISCONTINUED | OUTPATIENT
Start: 2021-11-24 | End: 2021-11-28 | Stop reason: HOSPADM

## 2021-11-24 RX ORDER — ACETYLCYSTEINE 200 MG/ML
SOLUTION ORAL; RESPIRATORY (INHALATION) CODE/TRAUMA/SEDATION MEDICATION
Status: COMPLETED | OUTPATIENT
Start: 2021-11-24 | End: 2021-11-24

## 2021-11-24 RX ORDER — PROPOFOL 10 MG/ML
INJECTION, EMULSION INTRAVENOUS CONTINUOUS PRN
Status: DISCONTINUED | OUTPATIENT
Start: 2021-11-24 | End: 2021-11-24

## 2021-11-24 RX ORDER — SUCRALFATE ORAL 1 G/10ML
1 SUSPENSION ORAL 4 TIMES DAILY
Qty: 300 ML | Refills: 1 | Status: SHIPPED | OUTPATIENT
Start: 2021-11-24 | End: 2022-03-01

## 2021-11-24 RX ADMIN — ACETYLCYSTEINE 3 ML: 200 SOLUTION ORAL; RESPIRATORY (INHALATION) at 08:35

## 2021-11-24 RX ADMIN — SODIUM CHLORIDE, SODIUM LACTATE, POTASSIUM CHLORIDE, AND CALCIUM CHLORIDE 100 ML/HR: .6; .31; .03; .02 INJECTION, SOLUTION INTRAVENOUS at 07:44

## 2021-11-24 RX ADMIN — GLYCOPYRROLATE 0.2 MG: 0.2 INJECTION, SOLUTION INTRAMUSCULAR; INTRAVENOUS at 08:07

## 2021-11-24 RX ADMIN — PROPOFOL 130 MCG/KG/MIN: 10 INJECTION, EMULSION INTRAVENOUS at 08:09

## 2021-11-24 RX ADMIN — LIDOCAINE HYDROCHLORIDE 100 MG: 20 INJECTION, SOLUTION EPIDURAL; INFILTRATION; INTRACAUDAL; PERINEURAL at 08:09

## 2021-11-24 RX ADMIN — PROPOFOL 20 MG: 10 INJECTION, EMULSION INTRAVENOUS at 08:19

## 2021-11-24 RX ADMIN — PROPOFOL 100 MG: 10 INJECTION, EMULSION INTRAVENOUS at 08:09

## 2021-12-01 ENCOUNTER — TELEPHONE (OUTPATIENT)
Dept: GASTROENTEROLOGY | Facility: CLINIC | Age: 76
End: 2021-12-01

## 2021-12-06 ENCOUNTER — TELEPHONE (OUTPATIENT)
Dept: GASTROENTEROLOGY | Facility: CLINIC | Age: 76
End: 2021-12-06

## 2022-02-23 ENCOUNTER — TELEPHONE (OUTPATIENT)
Dept: PREADMISSION TESTING | Facility: HOSPITAL | Age: 77
End: 2022-02-23

## 2022-03-01 NOTE — PRE-PROCEDURE INSTRUCTIONS
Pre-Surgery Instructions:   Medication Instructions    al mag oxide-diphenhydramine-lidocaine viscous (MAGIC MOUTHWASH) 1:1:1 suspension Patient was instructed by Physician and understands   ATORVASTATIN CALCIUM PO Patient was instructed by Physician and understands   calcium carbonate (OS-JACOB) 1250 (500 Ca) MG chewable tablet Patient was instructed by Physician and understands   Cholecalciferol (Vitamin D) 50 MCG (2000 UT) CAPS Patient was instructed by Physician and understands   famotidine (PEPCID) 20 mg tablet Patient was instructed by Physician and understands   levothyroxine 175 mcg tablet Instructed patient per Anesthesia Guidelines   losartan (COZAAR) 25 mg tablet Instructed patient per Anesthesia Guidelines   omeprazole (PriLOSEC) 20 mg delayed release capsule Patient was instructed by Physician and understands   sildenafil (VIAGRA) 100 mg tablet Patient was instructed by Physician and understands  Pt to take levothyroxine and losartan a m of procedure, all other a m meds pt to take once back home post procedure

## 2022-03-04 ENCOUNTER — ANESTHESIA (OUTPATIENT)
Dept: GASTROENTEROLOGY | Facility: AMBULARY SURGERY CENTER | Age: 77
End: 2022-03-04

## 2022-03-04 ENCOUNTER — HOSPITAL ENCOUNTER (OUTPATIENT)
Dept: GASTROENTEROLOGY | Facility: AMBULARY SURGERY CENTER | Age: 77
Setting detail: OUTPATIENT SURGERY
Discharge: HOME | End: 2022-03-04
Attending: INTERNAL MEDICINE
Payer: COMMERCIAL

## 2022-03-04 ENCOUNTER — ANESTHESIA EVENT (OUTPATIENT)
Dept: GASTROENTEROLOGY | Facility: AMBULARY SURGERY CENTER | Age: 77
End: 2022-03-04

## 2022-03-04 VITALS
SYSTOLIC BLOOD PRESSURE: 139 MMHG | TEMPERATURE: 96.3 F | OXYGEN SATURATION: 97 % | RESPIRATION RATE: 18 BRPM | HEART RATE: 64 BPM | WEIGHT: 165 LBS | BODY MASS INDEX: 25.09 KG/M2 | DIASTOLIC BLOOD PRESSURE: 57 MMHG

## 2022-03-04 DIAGNOSIS — K22.70 LONG-SEGMENT BARRETT'S ESOPHAGUS: ICD-10-CM

## 2022-03-04 PROCEDURE — 88305 TISSUE EXAM BY PATHOLOGIST: CPT | Performed by: PATHOLOGY

## 2022-03-04 PROCEDURE — 43239 EGD BIOPSY SINGLE/MULTIPLE: CPT | Performed by: INTERNAL MEDICINE

## 2022-03-04 RX ORDER — PROPOFOL 10 MG/ML
INJECTION, EMULSION INTRAVENOUS AS NEEDED
Status: DISCONTINUED | OUTPATIENT
Start: 2022-03-04 | End: 2022-03-04

## 2022-03-04 RX ORDER — SODIUM CHLORIDE, SODIUM LACTATE, POTASSIUM CHLORIDE, CALCIUM CHLORIDE 600; 310; 30; 20 MG/100ML; MG/100ML; MG/100ML; MG/100ML
30 INJECTION, SOLUTION INTRAVENOUS CONTINUOUS
Status: DISPENSED | OUTPATIENT
Start: 2022-03-04 | End: 2022-03-04

## 2022-03-04 RX ORDER — SODIUM CHLORIDE 9 MG/ML
INJECTION, SOLUTION INTRAVENOUS CONTINUOUS PRN
Status: DISCONTINUED | OUTPATIENT
Start: 2022-03-04 | End: 2022-03-04

## 2022-03-04 RX ADMIN — PROPOFOL 200 MG: 10 INJECTION, EMULSION INTRAVENOUS at 09:15

## 2022-03-04 RX ADMIN — PROPOFOL 50 MG: 10 INJECTION, EMULSION INTRAVENOUS at 09:20

## 2022-03-04 RX ADMIN — PROPOFOL 50 MG: 10 INJECTION, EMULSION INTRAVENOUS at 09:18

## 2022-03-04 RX ADMIN — SODIUM CHLORIDE: 9 INJECTION, SOLUTION INTRAVENOUS at 09:11

## 2022-03-04 RX ADMIN — SODIUM CHLORIDE, SODIUM LACTATE, POTASSIUM CHLORIDE, AND CALCIUM CHLORIDE 30 ML/HR: .6; .31; .03; .02 INJECTION, SOLUTION INTRAVENOUS at 08:47

## 2022-03-04 NOTE — ANESTHESIA PREPROCEDURE EVALUATION
Procedure:  EGD    Relevant Problems   CARDIO   (+) HTN (hypertension)   (+) Hyperlipidemia        Physical Exam    Airway    Mallampati score: II  TM Distance: >3 FB  Neck ROM: full     Dental   No notable dental hx     Cardiovascular  Cardiovascular exam normal    Pulmonary  Pulmonary exam normal     Other Findings        Anesthesia Plan  ASA Score- 2     Anesthesia Type- IV sedation with anesthesia with ASA Monitors  Additional Monitors:   Airway Plan:           Plan Factors-Exercise tolerance (METS): >4 METS  Chart reviewed  Imaging results reviewed  Existing labs reviewed  Patient summary reviewed  Patient is not a current smoker  Induction-     Postoperative Plan-     Informed Consent- Anesthetic plan and risks discussed with patient  I personally reviewed this patient with the CRNA  Discussed and agreed on the Anesthesia Plan with the CRNA  Tara Howe

## 2022-03-04 NOTE — ANESTHESIA POSTPROCEDURE EVALUATION
Post-Op Assessment Note    CV Status:  Stable  Pain Score: 0    Pain management: adequate     Mental Status:  Alert and awake   Hydration Status:  Euvolemic   PONV Controlled:  Controlled   Airway Patency:  Patent      Post Op Vitals Reviewed: Yes      Staff: CRNA, Anesthesiologist   Comments: vss        No complications documented      /69   Temp     Pulse  66   Resp  16   SpO2   100

## 2022-03-04 NOTE — H&P
History and Physical -  [facility]  Unknown Veronica 77 y o  male MRN: [id number]    HPI: Unknown Veronica is a 77y o  year old male who presents with Barretts      Review of Systems    Historical Information   Past Medical History:   Diagnosis Date    Grimaldo esophagus     Colon polyp     Decreased bone density     Disease of thyroid gland     hyperthyroid-goiter-thyroidectomy    Erectile dysfunction     Full dentures     GERD (gastroesophageal reflux disease)     Hyperlipidemia     Hypertension     Kyphosis     Osteopenia     Osteoporosis     Scoliosis     Seasonal allergies     Spondylosis     Wears glasses      Past Surgical History:   Procedure Laterality Date    COLONOSCOPY      EGD      RFA x2  and     PARATHYROID GLAND SURGERY      2 removed 2 remain    THYROIDECTOMY      TONSILLECTOMY       Social History   Social History     Substance and Sexual Activity   Alcohol Use Yes    Alcohol/week: 7 0 standard drinks    Types: 7 Glasses of wine per week    Comment: 1 glass with dinner     Social History     Substance and Sexual Activity   Drug Use Yes    Frequency: 4 0 times per week    Types: Marijuana    Comment: smokes joints     Social History     Tobacco Use   Smoking Status Former Smoker    Types: Cigarettes    Quit date:     Years since quittin 1   Smokeless Tobacco Never Used     Family History   Problem Relation Age of Onset    Colon cancer Father     Cancer Father         colon    Heart disease Father     Thyroid disease Mother         goiter    Other Sister         something like "alzheimers"    Cancer Paternal Aunt         colon    Thyroid disease Sister        Meds/Allergies     (Not in a hospital admission)      No Known Allergies    Objective     /72   Pulse 59   Temp (!) 96 3 °F (35 7 °C) (Temporal)   Resp (!) 25   Wt 74 8 kg (165 lb)   SpO2 100%   BMI 25 09 kg/m²       PHYSICAL EXAM    Gen: NAD  CV: RRR  CHEST: Clear  ABD: soft, NT/ND  EXT: no edema  Neuro: AAO      ASSESSMENT/PLAN:  This is a 77y o  year old male here for Barretts    PLAN:   Procedure: egd w Lethea Baumgarten

## 2022-03-21 ENCOUNTER — TELEPHONE (OUTPATIENT)
Dept: GASTROENTEROLOGY | Facility: CLINIC | Age: 77
End: 2022-03-21

## 2022-03-21 NOTE — TELEPHONE ENCOUNTER
Pt aware of results and recommendations  [address] will call in few months to sched repeat 6m EGD  Pt states he has enough meds at this time through the [place]

## 2022-03-21 NOTE — TELEPHONE ENCOUNTER
----- Message from Norm Simpson MD sent at 3/14/2022  9:21 PM EDT -----  Please inform the patient that the biopsies from lower esophagus confirm a short area of Grimaldo's esophagus with no cancers changes  We can continue to monitor this  Continue current medications  I recommend repeat EGD in 6 months time to continue to follow

## 2022-10-18 ENCOUNTER — TELEPHONE (OUTPATIENT)
Dept: GASTROENTEROLOGY | Facility: AMBULARY SURGERY CENTER | Age: 77
End: 2022-10-18

## 2022-10-20 NOTE — TELEPHONE ENCOUNTER
Per Dr Ines Boss colonoscopy report in 2020, he will be due in 2023 for repeat colonoscopy  S firs EGD with RFA, yes I would like him to schedule repeat EGD with me at [facility]   Please schedule this with RFA

## 2022-10-20 NOTE — TELEPHONE ENCOUNTER
Please see Dr Glory Vincent message       Patient has Ara Urbano [id number] valid from 10/07/22 exoires 04/05/2023    Auth and records from [place] scanned into patients chart    Referral is made    Thank you

## 2022-10-28 ENCOUNTER — TELEPHONE (OUTPATIENT)
Dept: GASTROENTEROLOGY | Facility: AMBULARY SURGERY CENTER | Age: 77
End: 2022-10-28

## 2022-10-28 NOTE — TELEPHONE ENCOUNTER
Pt returned my call   Patient is scheduled for EGD w/ RFA on December 20 , 2022 at [facility] with Neena Pinto MD  Patient is aware of pre-procedure prep of Nothing to eat after midnight, day of the procedure clear liquids only and nothing to drink 4 hours prior to the EGD and they will be called the day prior between 2 and 6 pm for time to report for procedure  Pre-procedure prep has been given to the patient  via [address] [facility] mail on October 28, 2022  [facility] VIA EMAIL

## 2022-12-20 ENCOUNTER — ANESTHESIA EVENT (OUTPATIENT)
Dept: GASTROENTEROLOGY | Facility: AMBULARY SURGERY CENTER | Age: 77
End: 2022-12-20

## 2022-12-20 ENCOUNTER — HOSPITAL ENCOUNTER (OUTPATIENT)
Dept: GASTROENTEROLOGY | Facility: AMBULARY SURGERY CENTER | Age: 77
Setting detail: OUTPATIENT SURGERY
Discharge: HOME | End: 2022-12-20
Attending: INTERNAL MEDICINE

## 2022-12-20 ENCOUNTER — ANESTHESIA (OUTPATIENT)
Dept: GASTROENTEROLOGY | Facility: AMBULARY SURGERY CENTER | Age: 77
End: 2022-12-20

## 2022-12-20 VITALS
OXYGEN SATURATION: 97 % | RESPIRATION RATE: 18 BRPM | TEMPERATURE: 97.2 F | DIASTOLIC BLOOD PRESSURE: 70 MMHG | SYSTOLIC BLOOD PRESSURE: 167 MMHG | HEART RATE: 61 BPM

## 2022-12-20 DIAGNOSIS — K22.70 LONG-SEGMENT BARRETT'S ESOPHAGUS: ICD-10-CM

## 2022-12-20 RX ORDER — SODIUM CHLORIDE, SODIUM LACTATE, POTASSIUM CHLORIDE, CALCIUM CHLORIDE 600; 310; 30; 20 MG/100ML; MG/100ML; MG/100ML; MG/100ML
125 INJECTION, SOLUTION INTRAVENOUS CONTINUOUS
Status: DISCONTINUED | OUTPATIENT
Start: 2022-12-20 | End: 2022-12-24 | Stop reason: HOSPADM

## 2022-12-20 RX ORDER — LIDOCAINE HYDROCHLORIDE 10 MG/ML
INJECTION, SOLUTION EPIDURAL; INFILTRATION; INTRACAUDAL; PERINEURAL AS NEEDED
Status: DISCONTINUED | OUTPATIENT
Start: 2022-12-20 | End: 2022-12-20

## 2022-12-20 RX ORDER — SODIUM CHLORIDE, SODIUM LACTATE, POTASSIUM CHLORIDE, CALCIUM CHLORIDE 600; 310; 30; 20 MG/100ML; MG/100ML; MG/100ML; MG/100ML
INJECTION, SOLUTION INTRAVENOUS CONTINUOUS PRN
Status: DISCONTINUED | OUTPATIENT
Start: 2022-12-20 | End: 2022-12-20

## 2022-12-20 RX ORDER — PROPOFOL 10 MG/ML
INJECTION, EMULSION INTRAVENOUS AS NEEDED
Status: DISCONTINUED | OUTPATIENT
Start: 2022-12-20 | End: 2022-12-20

## 2022-12-20 RX ADMIN — PROPOFOL 150 MG: 10 INJECTION, EMULSION INTRAVENOUS at 08:26

## 2022-12-20 RX ADMIN — SODIUM CHLORIDE, SODIUM LACTATE, POTASSIUM CHLORIDE, AND CALCIUM CHLORIDE: .6; .31; .03; .02 INJECTION, SOLUTION INTRAVENOUS at 08:23

## 2022-12-20 RX ADMIN — SODIUM CHLORIDE, POTASSIUM CHLORIDE, SODIUM LACTATE AND CALCIUM CHLORIDE 125 ML/HR: 600; 310; 30; 20 INJECTION, SOLUTION INTRAVENOUS at 08:07

## 2022-12-20 RX ADMIN — PROPOFOL 50 MG: 10 INJECTION, EMULSION INTRAVENOUS at 08:32

## 2022-12-20 RX ADMIN — LIDOCAINE HYDROCHLORIDE 50 MG: 10 INJECTION, SOLUTION EPIDURAL; INFILTRATION; INTRACAUDAL; PERINEURAL at 08:26

## 2022-12-20 NOTE — ANESTHESIA POSTPROCEDURE EVALUATION
Post-Op Assessment Note    CV Status:  Stable  Pain Score: 0    Pain management: adequate     Mental Status:  Alert and awake   Hydration Status:  Stable and euvolemic   PONV Controlled:  Controlled   Airway Patency:  Patent and adequate      Post Op Vitals Reviewed: Yes      Staff: CRNA         No notable events documented      BP (!) 82/47 (12/20/22 0837) 90/50   Temp     Pulse 62 (12/20/22 0837)   Resp 18 (12/20/22 0837)    SpO2 97 % (12/20/22 0837)

## 2022-12-20 NOTE — ANESTHESIA PREPROCEDURE EVALUATION
Procedure:  EGD    Relevant Problems   CARDIO   (+) HTN (hypertension)   (+) Hyperlipidemia        Physical Exam    Airway    Mallampati score: II  TM Distance: >3 FB  Neck ROM: full     Dental   No notable dental hx     Cardiovascular  Cardiovascular exam normal    Pulmonary  Pulmonary exam normal     Other Findings        Anesthesia Plan  ASA Score- 2     Anesthesia Type- IV sedation with anesthesia with ASA Monitors  Additional Monitors:   Airway Plan:           Plan Factors-Exercise tolerance (METS): >4 METS  Chart reviewed  Imaging results reviewed  Existing labs reviewed  Patient summary reviewed  Patient is a current smoker (marijuana)  Induction-     Postoperative Plan-     Informed Consent- Anesthetic plan and risks discussed with patient  I personally reviewed this patient with the CRNA  Discussed and agreed on the Anesthesia Plan with the CRNA  Ludivina Hernandez

## 2023-03-21 ENCOUNTER — OFFICE VISIT (OUTPATIENT)
Dept: URGENT CARE | Facility: MEDICAL CENTER | Age: 78
End: 2023-03-21

## 2023-03-21 ENCOUNTER — APPOINTMENT (OUTPATIENT)
Dept: RADIOLOGY | Facility: MEDICAL CENTER | Age: 78
End: 2023-03-21

## 2023-03-21 VITALS
BODY MASS INDEX: 25.31 KG/M2 | OXYGEN SATURATION: 98 % | SYSTOLIC BLOOD PRESSURE: 131 MMHG | TEMPERATURE: 98.4 F | HEIGHT: 68 IN | HEART RATE: 89 BPM | RESPIRATION RATE: 22 BRPM | DIASTOLIC BLOOD PRESSURE: 63 MMHG | WEIGHT: 167 LBS

## 2023-03-21 DIAGNOSIS — S20.211A CONTUSION OF RIB ON RIGHT SIDE, INITIAL ENCOUNTER: ICD-10-CM

## 2023-03-21 DIAGNOSIS — S20.211A CONTUSION OF RIB ON RIGHT SIDE, INITIAL ENCOUNTER: Primary | ICD-10-CM

## 2023-03-21 RX ORDER — METHOCARBAMOL 500 MG/1
500 TABLET, FILM COATED ORAL
Qty: 20 TABLET | Refills: 0 | Status: SHIPPED | OUTPATIENT
Start: 2023-03-21 | End: 2023-03-26

## 2023-03-21 NOTE — PATIENT INSTRUCTIONS
Contusion rib  Robaxin as directed- may become drowsy  Follow up with PCP in 3-5 days  Proceed to  ER if symptoms worsen

## 2023-03-21 NOTE — PROGRESS NOTES
[address]        NAME: Victoria Arroyo is a 69 y o  male  : 1945    MRN: [id number]  DATE: 2023  TIME: 2:13 PM    Assessment and Plan   Contusion of rib on right side, initial encounter [S20 211A]  1  Contusion of rib on right side, initial encounter  XR ribs right w pa chest min 3 views            Patient Instructions     Contusion rib  Robaxin as directed- may become drowsy  Follow up with PCP in 3-5 days  Proceed to  ER if symptoms worsen  Chief Complaint     Chief Complaint   Patient presents with   • Rib Injury     Pt reports right rib pain xabout 2 weeks  Upright position hurts, and a tingling sensation and bothers          History of Present Illness       52-year-old male who presents complaining of right lip pain  Patient states that 7 days ago he was working on the [employer] truck when he reached into it to grab something and slipped on ice hitting the right side of his chest against the edge of the truck  Denies head trauma, loss of consciousness, abdominal pain, nausea, vomiting  Review of Systems   Review of Systems   Constitutional: Negative  HENT: Negative  Eyes: Negative  Respiratory: Negative  Negative for apnea, cough, choking, chest tightness, shortness of breath, wheezing and stridor  Cardiovascular: Negative  Negative for chest pain           Current Medications       Current Outpatient Medications:   •  ATORVASTATIN CALCIUM PO, Take 20 mg by mouth daily at bedtime, Disp: , Rfl:   •  calcium carbonate (OS-JACOB) 1250 (500 Ca) MG chewable tablet, Chew daily, Disp: , Rfl:   •  Cholecalciferol (Vitamin D) 50 MCG (2000 UT) CAPS, Take 2,000 Units by mouth 2 (two) times a day, Disp: , Rfl:   •  diphenhydrAMINE HCl (BENADRYL ALLERGY PO), Take by mouth if needed, Disp: , Rfl:   •  famotidine (PEPCID) 20 mg tablet, Take 1 tablet (20 mg total) by mouth 2 (two) times a day, Disp: 60 tablet, Rfl: 11  •  levothyroxine 175 mcg tablet, Take 175 mcg by mouth every morning , Disp: , Rfl:   •  losartan (COZAAR) 25 mg tablet, Take 12 5 mg by mouth every morning , Disp: , Rfl:   •  omeprazole (PriLOSEC) 20 mg delayed release capsule, Take 2 capsules (40 mg total) by mouth 2 (two) times a day, Disp: 60 capsule, Rfl: 5  •  sildenafil (VIAGRA) 100 mg tablet, as needed, Disp: , Rfl:   •  al mag oxide-diphenhydramine-lidocaine viscous (MAGIC MOUTHWASH) 1:1:1 suspension, Swish and swallow 10 mL every 4 (four) hours as needed for mouth pain or discomfort (Patient not taking: Reported on 3/21/2023), Disp: 300 mL, Rfl: 1    Current Allergies     Allergies as of 03/21/2023   • (No Known Allergies)            The following portions of the patient's history were reviewed and updated as appropriate: allergies, current medications, past family history, past medical history, past social history, past surgical history and problem list      Past Medical History:   Diagnosis Date   • Grimaldo esophagus     EGD with RFA   • Colon polyp    • Decreased bone density    • Disease of thyroid gland     hyperthyroid-goiter-thyroidectomy   • Erectile dysfunction    • Full dentures    • GERD (gastroesophageal reflux disease)    • Hyperlipidemia    • Hypertension    • Kyphosis    • Osteopenia    • Osteoporosis    • Scoliosis    • Seasonal allergies    • Spondylosis    • Wears glasses        Past Surgical History:   Procedure Laterality Date   • COLONOSCOPY     • EGD      RFA x2 2020 and 2021, 3/2022 (RFA not done)   • PARATHYROID GLAND SURGERY      2 removed 2 remain   • THYROIDECTOMY      total   • TONSILLECTOMY      as child       Family History   Problem Relation Age of Onset   • Colon cancer Father    • Cancer Father         colon   • Heart disease Father    • Thyroid disease Mother         goiter   • Other Sister         something like "alzheimers"   • Cancer Paternal Aunt         colon   • Thyroid disease Sister          Medications have been verified          Objective   /63   Pulse 89   Temp 98 4 °F (36 9 °C)   Resp 22   Ht 5' 8" (1 727 m)   Wt 75 8 kg (167 lb)   SpO2 98%   BMI 25 39 kg/m²        Physical Exam     Physical Exam  Constitutional:       General: He is not in acute distress  Appearance: Normal appearance  He is well-developed  He is not diaphoretic  HENT:      Head: Normocephalic and atraumatic  Cardiovascular:      Rate and Rhythm: Normal rate and regular rhythm  Heart sounds: Normal heart sounds  Pulmonary:      Effort: Pulmonary effort is normal  No respiratory distress  Breath sounds: Normal breath sounds  No wheezing or rales  Chest:      Chest wall: No tenderness  Abdominal:      General: Abdomen is flat  There is no distension  Palpations: Abdomen is soft  There is no mass  Tenderness: There is no abdominal tenderness  There is no right CVA tenderness, left CVA tenderness, guarding or rebound  Musculoskeletal:      Cervical back: Normal range of motion and neck supple  Lymphadenopathy:      Cervical: No cervical adenopathy  Neurological:      General: No focal deficit present  Mental Status: He is alert and oriented to person, place, and time

## 2024-12-03 ENCOUNTER — TRANSCRIBE ORDERS (OUTPATIENT)
Dept: GASTROENTEROLOGY | Facility: CLINIC | Age: 79
End: 2024-12-03

## 2025-01-14 ENCOUNTER — TELEPHONE (OUTPATIENT)
Dept: GASTROENTEROLOGY | Facility: AMBULARY SURGERY CENTER | Age: 80
End: 2025-01-14

## 2025-01-14 ENCOUNTER — TELEPHONE (OUTPATIENT)
Age: 80
End: 2025-01-14

## 2025-01-14 NOTE — TELEPHONE ENCOUNTER
Lm for patient to call back. Provider is out sick today 1/14/25 and appointment will need to be rescheduled.

## 2025-01-14 NOTE — TELEPHONE ENCOUNTER
Patients GI provider:  Dr. Camarena/MARIBEL [facility]    Number to return call: [phone]    Reason for call: Pt returned a call to rs appt maty/ [facility] today due to she is sick. Pt rs for 1st available in July & placed on waitlist but please be aware that [facility] expires in April. Thank you    Scheduled procedure/appointment date if applicable: Apt 7/2/25

## 2025-02-07 ENCOUNTER — OFFICE VISIT (OUTPATIENT)
Dept: GASTROENTEROLOGY | Facility: AMBULARY SURGERY CENTER | Age: 80
End: 2025-02-07
Payer: COMMERCIAL

## 2025-02-07 ENCOUNTER — PREP FOR PROCEDURE (OUTPATIENT)
Dept: GASTROENTEROLOGY | Facility: AMBULARY SURGERY CENTER | Age: 80
End: 2025-02-07

## 2025-02-07 VITALS
OXYGEN SATURATION: 98 % | SYSTOLIC BLOOD PRESSURE: 170 MMHG | HEART RATE: 59 BPM | DIASTOLIC BLOOD PRESSURE: 82 MMHG | HEIGHT: 68 IN | BODY MASS INDEX: 25.46 KG/M2 | WEIGHT: 168 LBS

## 2025-02-07 DIAGNOSIS — K22.70 LONG-SEGMENT BARRETT'S ESOPHAGUS: Primary | ICD-10-CM

## 2025-02-07 DIAGNOSIS — Z80.0 FAMILY HISTORY OF COLON CANCER IN FATHER: ICD-10-CM

## 2025-02-07 PROCEDURE — 99214 OFFICE O/P EST MOD 30 MIN: CPT | Performed by: INTERNAL MEDICINE

## 2025-02-07 RX ORDER — SODIUM CHLORIDE, SODIUM LACTATE, POTASSIUM CHLORIDE, CALCIUM CHLORIDE 600; 310; 30; 20 MG/100ML; MG/100ML; MG/100ML; MG/100ML
125 INJECTION, SOLUTION INTRAVENOUS CONTINUOUS
OUTPATIENT
Start: 2025-02-07

## 2025-02-07 NOTE — PROGRESS NOTES
Name: Leeroy Santos      : 1945      MRN: [id number]  Encounter Provider: Quinton Camarena MD  Encounter Date: 2025   Encounter department: 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, status post RFA therapy, also due for colon cancer screening, family history of colon cancer  Assessment & Plan  Long-segment Grimaldo's esophagus  -Patient's last examination was 2 years ago, small islands of Grimaldo's at that time, no further RFA was warranted  -Repeat EGD for surveillance  -Continue omeprazole  Orders:    EGD; Future    Family history of colon cancer in father  -Last examination 5 years ago with several hyperplastic polyps  -Will schedule colonoscopy for high risk screening at the same time as his upper endoscopy  -Dulcolax MiraLAX bowel prep  -Discussed with the risks of procedure including bleeding, surgery, perforation, missed Pap detection rate  Orders:    Colonoscopy; Future        History of Present Illness   HPI  Leeroy Santos is a 79 y.o. male who presents for follow-up of Grimaldo's esophagus.  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  Patient has been doing well.  Denies any significant GI complaint, taking omeprazole 40 mg twice daily.  Denies any typical reflux symptoms, denies any nausea, vomiting, dysphagia, abdominal pain.  Reports having regular bowel moods, last colonoscopy  with several hyperplastic polyps, family history of colon cancer in his father.  Prior otherwise he has been doing well, had a mechanical fall today when he slipped on ice.    He reports that his urine has been intermittently more concentrated and darker at times      Review of Systems  Review of systems was negative except for  "pertinent positive mentioned in HPI         Objective   /82 (BP Location: Left arm, Patient Position: Sitting, Cuff Size: Standard)   Pulse 59   Ht 5' 8\" (1.727 m)   Wt 76.2 kg (168 lb)   SpO2 98%   BMI 25.54 kg/m²      Physical Exam  GEN: WN/WD, no acute distress  HEENT: anicteric, MMM, no cervical lymphadenopathy  CV: RRR, no m/r/g  CHEST: CTA b/l, no WRR  ABD: +BS, soft NT/ND, no hepatosplenomegaly  EXT: no C/C/E  NEURO: AAOx3  SKIN: no rashes      "

## 2025-02-07 NOTE — ASSESSMENT & PLAN NOTE
-Patient's last examination was 2 years ago, small islands of Grimaldo's at that time, no further RFA was warranted  -Repeat EGD for surveillance  -Continue omeprazole  Orders:    EGD; Future

## 2025-02-07 NOTE — PATIENT INSTRUCTIONS
Scheduled date of EGD/colonoscopy (as of today): April 15, 2025  Physician performing EGD/colonoscopy: Dr. Camarena   Location of EGD[facility]colonoscopy: [facility]   Desired bowel prep reviewed with patient: justin/dulc   Instructions reviewed with patient by: MANJIT   Clearances:  n/a

## 2025-02-07 NOTE — ASSESSMENT & PLAN NOTE
-Last examination 5 years ago with several hyperplastic polyps  -Will schedule colonoscopy for high risk screening at the same time as his upper endoscopy  -Dulcolax MiraLAX bowel prep  -Discussed with the risks of procedure including bleeding, surgery, perforation, missed Pap detection rate  Orders:    Colonoscopy; Future

## 2025-04-07 ENCOUNTER — TELEPHONE (OUTPATIENT)
Age: 80
End: 2025-04-07

## 2025-04-07 NOTE — TELEPHONE ENCOUNTER
Spoke with pt confirming his 4/15 procedure. His wife is his  and he will be called Monday with his arrival time. I had to email his instructions to him for the Miralax, dulcolax prep at [email or web address].

## 2025-04-11 ENCOUNTER — TELEPHONE (OUTPATIENT)
Age: 80
End: 2025-04-11

## 2025-04-11 NOTE — TELEPHONE ENCOUNTER
Patient calling questioning if Vit B12 have iron in it / Spoke with nurse Daphne MUÑOZ to no there is no iron in B12. Informed pt of b12 ok the week prior to procedure

## 2025-04-14 RX ORDER — LIDOCAINE HYDROCHLORIDE 10 MG/ML
0.5 INJECTION, SOLUTION EPIDURAL; INFILTRATION; INTRACAUDAL; PERINEURAL ONCE AS NEEDED
Status: CANCELLED | OUTPATIENT
Start: 2025-04-14

## 2025-04-14 RX ORDER — SODIUM CHLORIDE, SODIUM LACTATE, POTASSIUM CHLORIDE, CALCIUM CHLORIDE 600; 310; 30; 20 MG/100ML; MG/100ML; MG/100ML; MG/100ML
125 INJECTION, SOLUTION INTRAVENOUS CONTINUOUS
Status: CANCELLED | OUTPATIENT
Start: 2025-04-14

## 2025-04-15 ENCOUNTER — ANESTHESIA EVENT (OUTPATIENT)
Dept: GASTROENTEROLOGY | Facility: AMBULARY SURGERY CENTER | Age: 80
End: 2025-04-15
Payer: COMMERCIAL

## 2025-04-15 ENCOUNTER — HOSPITAL ENCOUNTER (OUTPATIENT)
Dept: GASTROENTEROLOGY | Facility: AMBULARY SURGERY CENTER | Age: 80
Setting detail: OUTPATIENT SURGERY
Discharge: HOME | End: 2025-04-15
Attending: INTERNAL MEDICINE
Payer: COMMERCIAL

## 2025-04-15 VITALS
OXYGEN SATURATION: 100 % | HEART RATE: 58 BPM | SYSTOLIC BLOOD PRESSURE: 119 MMHG | RESPIRATION RATE: 18 BRPM | TEMPERATURE: 97 F | DIASTOLIC BLOOD PRESSURE: 81 MMHG

## 2025-04-15 DIAGNOSIS — K22.70 BARRETT'S ESOPHAGUS WITHOUT DYSPLASIA: ICD-10-CM

## 2025-04-15 DIAGNOSIS — K22.70 LONG-SEGMENT BARRETT'S ESOPHAGUS: ICD-10-CM

## 2025-04-15 DIAGNOSIS — Z80.0 FAMILY HISTORY OF COLON CANCER IN FATHER: ICD-10-CM

## 2025-04-15 PROCEDURE — 45380 COLONOSCOPY AND BIOPSY: CPT | Performed by: INTERNAL MEDICINE

## 2025-04-15 PROCEDURE — 88305 TISSUE EXAM BY PATHOLOGIST: CPT | Performed by: PATHOLOGY

## 2025-04-15 PROCEDURE — 43239 EGD BIOPSY SINGLE/MULTIPLE: CPT | Performed by: INTERNAL MEDICINE

## 2025-04-15 RX ORDER — OMEPRAZOLE 20 MG/1
40 CAPSULE, DELAYED RELEASE ORAL 2 TIMES DAILY
Qty: 60 CAPSULE | Refills: 5 | Status: SHIPPED | OUTPATIENT
Start: 2025-04-15

## 2025-04-15 RX ORDER — SODIUM CHLORIDE, SODIUM LACTATE, POTASSIUM CHLORIDE, CALCIUM CHLORIDE 600; 310; 30; 20 MG/100ML; MG/100ML; MG/100ML; MG/100ML
125 INJECTION, SOLUTION INTRAVENOUS CONTINUOUS
Status: DISCONTINUED | OUTPATIENT
Start: 2025-04-15 | End: 2025-04-19 | Stop reason: HOSPADM

## 2025-04-15 RX ORDER — LIDOCAINE HYDROCHLORIDE 10 MG/ML
0.5 INJECTION, SOLUTION EPIDURAL; INFILTRATION; INTRACAUDAL; PERINEURAL ONCE AS NEEDED
Status: COMPLETED | OUTPATIENT
Start: 2025-04-15 | End: 2025-04-15

## 2025-04-15 RX ORDER — SODIUM CHLORIDE, SODIUM LACTATE, POTASSIUM CHLORIDE, CALCIUM CHLORIDE 600; 310; 30; 20 MG/100ML; MG/100ML; MG/100ML; MG/100ML
125 INJECTION, SOLUTION INTRAVENOUS CONTINUOUS
Status: DISCONTINUED | OUTPATIENT
Start: 2025-04-15 | End: 2025-04-15 | Stop reason: SDUPTHER

## 2025-04-15 RX ORDER — PROPOFOL 10 MG/ML
INJECTION, EMULSION INTRAVENOUS AS NEEDED
Status: DISCONTINUED | OUTPATIENT
Start: 2025-04-15 | End: 2025-04-15

## 2025-04-15 RX ADMIN — PROPOFOL 20 MG: 10 INJECTION, EMULSION INTRAVENOUS at 11:12

## 2025-04-15 RX ADMIN — PROPOFOL 50 MG: 10 INJECTION, EMULSION INTRAVENOUS at 11:04

## 2025-04-15 RX ADMIN — Medication 40 MG: at 11:13

## 2025-04-15 RX ADMIN — PROPOFOL 20 MG: 10 INJECTION, EMULSION INTRAVENOUS at 11:15

## 2025-04-15 RX ADMIN — PROPOFOL 120 MG: 10 INJECTION, EMULSION INTRAVENOUS at 10:54

## 2025-04-15 RX ADMIN — PROPOFOL 30 MG: 10 INJECTION, EMULSION INTRAVENOUS at 11:01

## 2025-04-15 RX ADMIN — PROPOFOL 50 MG: 10 INJECTION, EMULSION INTRAVENOUS at 11:07

## 2025-04-15 RX ADMIN — PROPOFOL 20 MG: 10 INJECTION, EMULSION INTRAVENOUS at 10:59

## 2025-04-15 RX ADMIN — PROPOFOL 30 MG: 10 INJECTION, EMULSION INTRAVENOUS at 10:57

## 2025-04-15 RX ADMIN — PROPOFOL 20 MG: 10 INJECTION, EMULSION INTRAVENOUS at 11:14

## 2025-04-15 RX ADMIN — SODIUM CHLORIDE, SODIUM LACTATE, POTASSIUM CHLORIDE, AND CALCIUM CHLORIDE: .6; .31; .03; .02 INJECTION, SOLUTION INTRAVENOUS at 10:46

## 2025-04-15 RX ADMIN — LIDOCAINE HYDROCHLORIDE 5 ML: 10 INJECTION, SOLUTION EPIDURAL; INFILTRATION; INTRACAUDAL at 10:54

## 2025-04-15 NOTE — ANESTHESIA PREPROCEDURE EVALUATION
Procedure:  COLONOSCOPY  EGD    Relevant Problems   CARDIO   (+) HTN (hypertension)   (+) Hyperlipidemia        Physical Exam    Airway    Mallampati score: II  TM Distance: >3 FB  Neck ROM: full     Dental   No notable dental hx     Cardiovascular  Rhythm: regular, Rate: normal, Cardiovascular exam normal    Pulmonary  Pulmonary exam normal Breath sounds clear to auscultation    Other Findings        Anesthesia Plan  ASA Score- 2     Anesthesia Type- IV sedation with anesthesia with ASA Monitors.         Additional Monitors:     Airway Plan:     Comment: Discussed risks/benefits, including medication reactions, awareness, aspiration, and serious/life threatening complications. Plan to maintain native airway with IVGA, monitored with EtCO2.       Plan Factors-Exercise tolerance (METS): >4 METS.    Chart reviewed.    Patient summary reviewed.      Patient instructed to abstain from smoking on day of procedure. Patient did not smoke on day of surgery.            Induction- intravenous.    Postoperative Plan-         Informed Consent- Anesthetic plan and risks discussed with patient.  I personally reviewed this patient with the CRNA. Discussed and agreed on the Anesthesia Plan with the CRNA..      NPO Status:  Vitals Value Taken Time   Date of last liquid 04/15/25 04/15/25 0945   Time of last liquid 0330 04/15/25 0945   Date of last solid 04/13/25 04/15/25 0945   Time of last solid 1900 04/15/25 0945

## 2025-04-15 NOTE — DISCHARGE INSTRUCTIONS
Upper Endoscopy and Colonoscopy   WHAT YOU NEED TO KNOW:   An upper endoscopy is also called an upper gastrointestinal (GI) endoscopy, or an esophagogastroduodenoscopy (EGD). It is a procedure to examine the inside of your esophagus, stomach, and duodenum (first part of the small intestine) with a scope. You may feel bloated, gassy, or have some abdominal discomfort after your procedure. Your throat may be sore for 24 to 36 hours. You may burp or pass gas from air that is still inside your body.                A colonoscopy is a procedure to examine the inside of your colon (intestine) with a scope. Polyps or tissue growths may have been removed during your colonoscopy. It is normal to feel bloated and to have some abdominal discomfort. You should be passing gas. If you have hemorrhoids or you had polyps removed, you may have a small amount of bleeding.          DISCHARGE INSTRUCTIONS:   Seek care immediately if:   You have sudden, severe abdominal pain.     You have problems swallowing.     You have a large amount of black, sticky bowel movements or blood in your bowel movements.     You have sudden trouble breathing.     You feel weak, lightheaded, or faint or your heart beats faster than normal for you.     Contact your healthcare provider if:   You have a fever and chills.      You have nausea or are vomiting.      Your abdomen is bloated or feels full and hard.     You have abdominal pain.    You have a large amount of black, sticky bowel movements or blood in your bowel movements.    You have not had a bowel movement for 3 days after your procedure.    You have rash or hives.    You have questions or concerns about your procedure.     Activity:   ·       Do not lift, strain, or run for 24 hours after your procedure.     ·       Rest after your procedure. You have been given medicine to relax you. Do not drive or make important decisions until the day after your procedure. Return to your normal activity as  directed.     ·       Relieve gas and discomfort from bloating by lying on your right side with a heating pad on your abdomen. You may need to take short walks to help the gas move out. Eat small meals until bloating is relieved.  Follow up with your healthcare provider as directed: Write down your questions so you remember to ask them during your visits.      If you take a “blood thinner”, please review the specific instructions from your endoscopist about when you should resume it. These can be found in the “Recommendation” and “Your Medication list” sections of this After Visit Summary.

## 2025-04-15 NOTE — H&P
"History and Physical -  [facility]  Leeroy Santos 79 y.o. male MRN: [id number]    HPI: Leeroy Santos is a 79 y.o. year old male who presents with hx of Barretts and family hx of colon cancer.       Review of Systems    Historical Information   Past Medical History:   Diagnosis Date    Grimaldo esophagus     EGD with RFA    Colon polyp     Decreased bone density     Disease of thyroid gland     hyperthyroid-goiter-thyroidectomy    Erectile dysfunction     Full dentures     GERD (gastroesophageal reflux disease)     Hyperlipidemia     Hypertension     Kyphosis     Osteopenia     Osteoporosis     Scoliosis     Seasonal allergies     Spondylosis     Wears glasses      Past Surgical History:   Procedure Laterality Date    COLONOSCOPY      EGD      RFA x2  and , 3/2022 (RFA not done)    PARATHYROID GLAND SURGERY      2 removed 2 remain    THYROIDECTOMY      total    TONSILLECTOMY      as child     Social History   Social History     Substance and Sexual Activity   Alcohol Use Yes    Alcohol/week: 7.0 standard drinks of alcohol    Types: 7 Glasses of wine per week    Comment: 1 glass with dinner     Social History     Substance and Sexual Activity   Drug Use Not Currently    Frequency: 4.0 times per week    Types: Marijuana    Comment: smokes joints prior to using e-cigarettes THC     Social History     Tobacco Use   Smoking Status Former    Current packs/day: 0.00    Types: Cigarettes    Quit date:     Years since quittin.2   Smokeless Tobacco Never     Family History   Problem Relation Age of Onset    Colon cancer Father     Cancer Father         colon    Heart disease Father     Thyroid disease Mother         goiter    Other Sister         something like \"alzheimers\"    Cancer Paternal Aunt         colon    Thyroid disease Sister        Meds/Allergies     Not in a hospital admission.    No Known Allergies    Objective     /67   Pulse 73   Temp 97.8 °F (36.6 °C) (Temporal)  "  Resp 18   SpO2 99%       PHYSICAL EXAM    Gen: NAD  CV: RRR  CHEST: Clear  ABD: soft, NT/ND  EXT: no edema  Neuro: AAO      ASSESSMENT/PLAN:  This is a 79 y.o. year old male here for  hx of Barretts and family hx of colon cancer.     PLAN:   Procedure: egd/colonoscopy

## 2025-04-15 NOTE — ANESTHESIA POSTPROCEDURE EVALUATION
Post-Op Assessment Note    CV Status:  Stable    Pain management: adequate       Mental Status:  Alert and awake   Hydration Status:  Euvolemic   PONV Controlled:  Controlled   Airway Patency:  Patent     Post Op Vitals Reviewed: Yes    No anethesia notable event occurred.    Staff: CRNA           Last Filed PACU Vitals:  Vitals Value Taken Time   Temp 97 °F (36.1 °C) 04/15/25 1127   Pulse 56 04/15/25 1127   BP 94/55 04/15/25 1127   Resp 18 04/15/25 1127   SpO2 97 % 04/15/25 1127       Modified Krista:     Vitals Value Taken Time   Activity 2 04/15/25 1127   Respiration 2 04/15/25 1127   Circulation 1 04/15/25 1127   Consciousness 1 04/15/25 1127   Oxygen Saturation 2 04/15/25 1127     Modified Krista Score: 8

## 2025-04-16 PROCEDURE — 88305 TISSUE EXAM BY PATHOLOGIST: CPT | Performed by: PATHOLOGY

## 2025-04-21 ENCOUNTER — RESULTS FOLLOW-UP (OUTPATIENT)
Dept: GASTROENTEROLOGY | Facility: AMBULARY SURGERY CENTER | Age: 80
End: 2025-04-21

## 2025-04-21 NOTE — RESULT ENCOUNTER NOTE
Please inform the patient the biopsies of stomach were negative for H. pylori.  Biopsies from lower esophagus were acid reflux changes and a small area of Girmaldo's esophagus without any dysplasia or cancer which is good news.    Please take the pantoprazole twice daily.  You should have a repeat upper endoscopy in 6 months time.    Your recent colonoscopy was normal, you do not need any further examinations.    Please call with any questions or concerns

## 2025-04-21 NOTE — TELEPHONE ENCOUNTER
----- Message from Quinton Camarena MD sent at 4/21/2025  8:09 AM EDT -----  Please inform the patient the biopsies of stomach were negative for H. pylori.  Biopsies from lower esophagus were acid reflux changes and a small area of Grimaldo's esophagus without any dysplasia or cancer which is good news.    Please take the pantoprazole twice daily.  You should have a repeat upper endoscopy in 6 months time.    Your recent colonoscopy was normal, you do not need any further examinations.    Please call with any questions or concerns

## 2025-04-24 ENCOUNTER — PREP FOR PROCEDURE (OUTPATIENT)
Age: 80
End: 2025-04-24

## 2025-04-24 ENCOUNTER — TELEPHONE (OUTPATIENT)
Age: 80
End: 2025-04-24

## 2025-04-24 DIAGNOSIS — K22.70 BARRETT'S ESOPHAGUS WITHOUT DYSPLASIA: Primary | ICD-10-CM

## 2025-04-24 NOTE — TELEPHONE ENCOUNTER
Patient returned call, I discussed his biopsy results with him and he was transferred back to [place] to schedule repeat EGD in 6 months.

## 2025-04-24 NOTE — TELEPHONE ENCOUNTER
Scheduled date of EGD(as of today): 11/03/2025  Physician performing EGD: DR JAIME  Location of EGD: [facility]  Instructions reviewed with patient by: Sophia via telephone. Procedure directions sent via email to [email or web address]  Clearances: N/A      6 MTH REPEAT EGD

## 2025-05-12 ENCOUNTER — APPOINTMENT (EMERGENCY)
Dept: RADIOLOGY | Facility: HOSPITAL | Age: 80
DRG: 242 | End: 2025-05-12
Payer: COMMERCIAL

## 2025-05-12 ENCOUNTER — HOSPITAL ENCOUNTER (INPATIENT)
Facility: HOSPITAL | Age: 80
LOS: 2 days | Discharge: HOME | DRG: 242 | End: 2025-05-15
Attending: EMERGENCY MEDICINE | Admitting: STUDENT IN AN ORGANIZED HEALTH CARE EDUCATION/TRAINING PROGRAM
Payer: COMMERCIAL

## 2025-05-12 ENCOUNTER — APPOINTMENT (EMERGENCY)
Dept: CT IMAGING | Facility: HOSPITAL | Age: 80
DRG: 242 | End: 2025-05-12
Payer: COMMERCIAL

## 2025-05-12 DIAGNOSIS — R03.0 ELEVATED BLOOD PRESSURE READING: ICD-10-CM

## 2025-05-12 DIAGNOSIS — R55 SYNCOPE: ICD-10-CM

## 2025-05-12 DIAGNOSIS — I44.2 COMPLETE HEART BLOCK (HCC): ICD-10-CM

## 2025-05-12 DIAGNOSIS — I60.9 SUBARACHNOID HEMORRHAGE (HCC): Primary | ICD-10-CM

## 2025-05-12 DIAGNOSIS — T14.8XXA ABRASION: ICD-10-CM

## 2025-05-12 DIAGNOSIS — I45.5 SINUS PAUSE: ICD-10-CM

## 2025-05-12 LAB
2HR DELTA HS TROPONIN: 0 NG/L
ABO GROUP BLD: NORMAL
ABO GROUP BLD: NORMAL
ALBUMIN SERPL BCG-MCNC: 4.1 G/DL (ref 3.5–5)
ALP SERPL-CCNC: 49 U/L (ref 34–104)
ALT SERPL W P-5'-P-CCNC: 10 U/L (ref 7–52)
ANION GAP SERPL CALCULATED.3IONS-SCNC: 5 MMOL/L (ref 4–13)
AST SERPL W P-5'-P-CCNC: 13 U/L (ref 13–39)
BASOPHILS # BLD AUTO: 0.05 THOUSANDS/ÂΜL (ref 0–0.1)
BASOPHILS NFR BLD AUTO: 1 % (ref 0–1)
BILIRUB SERPL-MCNC: 0.36 MG/DL (ref 0.2–1)
BLD GP AB SCN SERPL QL: NEGATIVE
BUN SERPL-MCNC: 25 MG/DL (ref 5–25)
CALCIUM SERPL-MCNC: 9.3 MG/DL (ref 8.4–10.2)
CARDIAC TROPONIN I PNL SERPL HS: 5 NG/L (ref ?–50)
CARDIAC TROPONIN I PNL SERPL HS: 5 NG/L (ref ?–50)
CHLORIDE SERPL-SCNC: 109 MMOL/L (ref 96–108)
CK SERPL-CCNC: 131 U/L (ref 39–308)
CO2 SERPL-SCNC: 27 MMOL/L (ref 21–32)
CREAT SERPL-MCNC: 0.87 MG/DL (ref 0.6–1.3)
EOSINOPHIL # BLD AUTO: 0.05 THOUSAND/ÂΜL (ref 0–0.61)
EOSINOPHIL NFR BLD AUTO: 1 % (ref 0–6)
ERYTHROCYTE [DISTWIDTH] IN BLOOD BY AUTOMATED COUNT: 13.8 % (ref 11.6–15.1)
GFR SERPL CREATININE-BSD FRML MDRD: 82 ML/MIN/1.73SQ M
GLUCOSE SERPL-MCNC: 87 MG/DL (ref 65–140)
HCT VFR BLD AUTO: 38.5 % (ref 36.5–49.3)
HGB BLD-MCNC: 12.6 G/DL (ref 12–17)
IMM GRANULOCYTES # BLD AUTO: 0.02 THOUSAND/UL (ref 0–0.2)
IMM GRANULOCYTES NFR BLD AUTO: 0 % (ref 0–2)
LYMPHOCYTES # BLD AUTO: 1.63 THOUSANDS/ÂΜL (ref 0.6–4.47)
LYMPHOCYTES NFR BLD AUTO: 23 % (ref 14–44)
MCH RBC QN AUTO: 30.7 PG (ref 26.8–34.3)
MCHC RBC AUTO-ENTMCNC: 32.7 G/DL (ref 31.4–37.4)
MCV RBC AUTO: 94 FL (ref 82–98)
MONOCYTES # BLD AUTO: 0.85 THOUSAND/ÂΜL (ref 0.17–1.22)
MONOCYTES NFR BLD AUTO: 12 % (ref 4–12)
NEUTROPHILS # BLD AUTO: 4.6 THOUSANDS/ÂΜL (ref 1.85–7.62)
NEUTS SEG NFR BLD AUTO: 63 % (ref 43–75)
NRBC BLD AUTO-RTO: 0 /100 WBCS
PLATELET # BLD AUTO: 325 THOUSANDS/UL (ref 149–390)
PMV BLD AUTO: 8.8 FL (ref 8.9–12.7)
POTASSIUM SERPL-SCNC: 4.4 MMOL/L (ref 3.5–5.3)
PROT SERPL-MCNC: 6.5 G/DL (ref 6.4–8.4)
RBC # BLD AUTO: 4.1 MILLION/UL (ref 3.88–5.62)
RH BLD: POSITIVE
RH BLD: POSITIVE
SODIUM SERPL-SCNC: 141 MMOL/L (ref 135–147)
SPECIMEN EXPIRATION DATE: NORMAL
T4 FREE SERPL-MCNC: 0.97 NG/DL (ref 0.61–1.12)
TSH SERPL DL<=0.05 MIU/L-ACNC: 9.23 UIU/ML (ref 0.45–4.5)
WBC # BLD AUTO: 7.2 THOUSAND/UL (ref 4.31–10.16)

## 2025-05-12 PROCEDURE — 73552 X-RAY EXAM OF FEMUR 2/>: CPT

## 2025-05-12 PROCEDURE — 84439 ASSAY OF FREE THYROXINE: CPT | Performed by: NURSE PRACTITIONER

## 2025-05-12 PROCEDURE — 84484 ASSAY OF TROPONIN QUANT: CPT

## 2025-05-12 PROCEDURE — 71046 X-RAY EXAM CHEST 2 VIEWS: CPT

## 2025-05-12 PROCEDURE — 70450 CT HEAD/BRAIN W/O DYE: CPT

## 2025-05-12 PROCEDURE — 86850 RBC ANTIBODY SCREEN: CPT | Performed by: NURSE PRACTITIONER

## 2025-05-12 PROCEDURE — 90715 TDAP VACCINE 7 YRS/> IM: CPT | Performed by: EMERGENCY MEDICINE

## 2025-05-12 PROCEDURE — 84443 ASSAY THYROID STIM HORMONE: CPT | Performed by: NURSE PRACTITIONER

## 2025-05-12 PROCEDURE — 90471 IMMUNIZATION ADMIN: CPT

## 2025-05-12 PROCEDURE — 86901 BLOOD TYPING SEROLOGIC RH(D): CPT | Performed by: NURSE PRACTITIONER

## 2025-05-12 PROCEDURE — 82550 ASSAY OF CK (CPK): CPT | Performed by: EMERGENCY MEDICINE

## 2025-05-12 PROCEDURE — 99285 EMERGENCY DEPT VISIT HI MDM: CPT

## 2025-05-12 PROCEDURE — 85025 COMPLETE CBC W/AUTO DIFF WBC: CPT

## 2025-05-12 PROCEDURE — 99291 CRITICAL CARE FIRST HOUR: CPT | Performed by: STUDENT IN AN ORGANIZED HEALTH CARE EDUCATION/TRAINING PROGRAM

## 2025-05-12 PROCEDURE — 99285 EMERGENCY DEPT VISIT HI MDM: CPT | Performed by: EMERGENCY MEDICINE

## 2025-05-12 PROCEDURE — 96360 HYDRATION IV INFUSION INIT: CPT

## 2025-05-12 PROCEDURE — 36415 COLL VENOUS BLD VENIPUNCTURE: CPT

## 2025-05-12 PROCEDURE — 86900 BLOOD TYPING SEROLOGIC ABO: CPT | Performed by: NURSE PRACTITIONER

## 2025-05-12 PROCEDURE — 80053 COMPREHEN METABOLIC PANEL: CPT

## 2025-05-12 PROCEDURE — 93005 ELECTROCARDIOGRAM TRACING: CPT

## 2025-05-12 RX ORDER — FAMOTIDINE 20 MG/1
20 TABLET, FILM COATED ORAL 2 TIMES DAILY
Status: DISCONTINUED | OUTPATIENT
Start: 2025-05-12 | End: 2025-05-12

## 2025-05-12 RX ORDER — LEVETIRACETAM 500 MG/1
500 TABLET ORAL EVERY 12 HOURS SCHEDULED
Status: DISCONTINUED | OUTPATIENT
Start: 2025-05-12 | End: 2025-05-15 | Stop reason: HOSPADM

## 2025-05-12 RX ORDER — LOSARTAN POTASSIUM 25 MG/1
12.5 TABLET ORAL EVERY MORNING
Status: DISCONTINUED | OUTPATIENT
Start: 2025-05-13 | End: 2025-05-15 | Stop reason: HOSPADM

## 2025-05-12 RX ORDER — FAMOTIDINE 20 MG/1
20 TABLET, FILM COATED ORAL DAILY
Status: DISCONTINUED | OUTPATIENT
Start: 2025-05-13 | End: 2025-05-15 | Stop reason: HOSPADM

## 2025-05-12 RX ORDER — PANTOPRAZOLE SODIUM 40 MG/1
40 TABLET, DELAYED RELEASE ORAL
Status: DISCONTINUED | OUTPATIENT
Start: 2025-05-13 | End: 2025-05-13

## 2025-05-12 RX ORDER — ATORVASTATIN CALCIUM 20 MG/1
20 TABLET, FILM COATED ORAL
Status: DISCONTINUED | OUTPATIENT
Start: 2025-05-12 | End: 2025-05-15 | Stop reason: HOSPADM

## 2025-05-12 RX ORDER — LEVOTHYROXINE SODIUM 175 UG/1
175 TABLET ORAL
Status: DISCONTINUED | OUTPATIENT
Start: 2025-05-13 | End: 2025-05-15 | Stop reason: HOSPADM

## 2025-05-12 RX ORDER — ACETAMINOPHEN 325 MG/1
975 TABLET ORAL EVERY 8 HOURS SCHEDULED
Status: DISCONTINUED | OUTPATIENT
Start: 2025-05-12 | End: 2025-05-15 | Stop reason: HOSPADM

## 2025-05-12 RX ADMIN — ACETAMINOPHEN 975 MG: 325 TABLET, FILM COATED ORAL at 16:52

## 2025-05-12 RX ADMIN — LEVETIRACETAM 500 MG: 500 TABLET, FILM COATED ORAL at 21:06

## 2025-05-12 RX ADMIN — SODIUM CHLORIDE 250 ML: 0.9 INJECTION, SOLUTION INTRAVENOUS at 14:01

## 2025-05-12 RX ADMIN — TETANUS TOXOID, REDUCED DIPHTHERIA TOXOID AND ACELLULAR PERTUSSIS VACCINE, ADSORBED 0.5 ML: 5; 2.5; 8; 8; 2.5 SUSPENSION INTRAMUSCULAR at 14:02

## 2025-05-12 RX ADMIN — ATORVASTATIN CALCIUM 20 MG: 20 TABLET, FILM COATED ORAL at 21:06

## 2025-05-12 NOTE — H&P
"H&P - Trauma   Name: Leeroy Santos 79 y.o. male I MRN: [id number]  Unit/Bed#: ED-16 I Date of Admission: 5/12/2025   Date of Service: 5/12/2025 I Hospital Day: 0     Assessment & Plan  Subarachnoid hemorrhage (HCC)  Repeat HCT in am  Neuro checks  HOT PRotocol  Syncope  \"Felt fussy while standing in kitchen and fell backwards striking his back on right side  Abrasion  On right side, midway down back  Elevated blood pressure reading  Continue to monitor    Trauma Alert: Other ER Consult    Model of Arrival: Self    Trauma Team: Attending Michelle and BANDAR Morgan  Consultants:      History of Present Illness   Chief Complaint: feels fuzzy  Mechanism:Fall     Leeroy Santos is a 79 y.o. male who presents with history of a fall 2 days ago.  States he was feeling fuzzy and standing in the kitchen.  Started to fall backwards and this was witnessed by his wife.  Waited two days to come in.  [facility], they instructed him to come to ER.    Review of Systems   Constitutional:  Positive for activity change.   HENT: Negative.     Eyes: Negative.    Respiratory: Negative.     Cardiovascular: Negative.    Gastrointestinal: Negative.    Endocrine: Negative.    Genitourinary: Negative.    Musculoskeletal: Negative.    Skin:  Positive for wound.        Scratch type wound to right side of back mid way down.  No active bleeding   Allergic/Immunologic: Negative.    Neurological:         Fuzziness   Hematological: Negative.    Psychiatric/Behavioral: Negative.         Immunization History   Administered Date(s) Administered    COVID-19 Moderna mRNA Vaccine 12 Yr+ 50 mcg/0.5 mL (Spikevax) 12/03/2024    COVID-19 PFIZER VACCINE 0.3 ML IM 02/13/2021, 03/06/2021, 12/21/2021    INFLUENZA 11/24/2008    Influenza Quadrivalent Preservative Free 3 years and older IM 03/12/2019, 10/15/2019, 12/08/2020    Pneumococcal 06/03/2013    Pneumococcal Conjugate 13-Valent 07/26/2016    Td (adult), Unspecified " 07/26/2016    Tdap 05/12/2025    Zoster 08/26/2016     Last Tetanus: unkown    No data recordedISAR Score: Did you order a geriatric consult if the score was 2 or greater?: no       Objective :  Temp:  [97.4 °F (36.3 °C)] 97.4 °F (36.3 °C)  HR:  [48-58] 50  BP: (149-199)/(66-86) 176/75  Resp:  [18] 18  SpO2:  [98 %-100 %] 99 %  O2 Device: None (Room air)    Initial Vitals:   Temperature: (!) 97.4 °F (36.3 °C) (05/12/25 1211)  Pulse: 58 (05/12/25 1211)  Respirations: 18 (05/12/25 1211)  Blood Pressure: (!) 199/86 (05/12/25 1211)    Primary Survey:   Airway:        Status: patent;        Pre-hospital Interventions: none        Hospital Interventions: none  Breathing:        Pre-hospital Interventions: none       Effort: normal       Right breath sounds: normal       Left breath sounds: normal  Circulation:        Rhythm: regular       Rate: regular   Right Pulses Left Pulses    R radial: 2+  R femoral: 2+  R pedal: 2+     L radial: 2+  L femoral: 2+  L pedal: 2+       Disability:        GCS: Eye: 4; Verbal: 5 Motor: 6 Total: 15       Right Pupil: round;  reactive         Left Pupil:  round;  reactive      R Motor Strength L Motor Strength    R : 5/5  R dorsiflex: 5/5  R plantarflex: 5/5 L : 5/5  L dorsiflex: 5/5  L plantarflex: 5/5        Sensory:  No sensory deficit  Exposure:           Secondary Survey:  Physical Exam  Constitutional:       Appearance: Normal appearance.   HENT:      Head: Normocephalic and atraumatic.      Right Ear: External ear normal.      Left Ear: External ear normal.      Mouth/Throat:      Mouth: Mucous membranes are moist.   Eyes:      Extraocular Movements: Extraocular movements intact.      Pupils: Pupils are equal, round, and reactive to light.   Cardiovascular:      Rate and Rhythm: Normal rate and regular rhythm.      Pulses: Normal pulses.      Heart sounds: Normal heart sounds.   Pulmonary:      Effort: Pulmonary effort is normal.      Breath sounds: Normal breath sounds.    Abdominal:      Palpations: Abdomen is soft.   Genitourinary:     Comments: States he has been voiding without difficulty  Musculoskeletal:         General: Normal range of motion.      Cervical back: Normal range of motion and neck supple.   Skin:     General: Skin is warm and dry.      Capillary Refill: Capillary refill takes less than 2 seconds.   Neurological:      General: No focal deficit present.      Mental Status: He is alert and oriented to person, place, and time.   Psychiatric:         Mood and Affect: Mood normal.         Behavior: Behavior normal.             Lab Results: I have reviewed the following results:  Recent Labs     05/12/25  1338 05/12/25  1522   WBC 7.20  --    HGB 12.6  --    HCT 38.5  --      --    SODIUM 141  --    K 4.4  --    *  --    CO2 27  --    BUN 25  --    CREATININE 0.87  --    GLUC 87  --    AST 13  --    ALT 10  --    ALB 4.1  --    TBILI 0.36  --    ALKPHOS 49  --    HSTNI0 5  --    HSTNI2  --  5       Imaging Results: I have personally reviewed pertinent images saved in PACS. CT scan findings (and other pertinent positive findings on images) were discussed with radiology. My interpretation of the images/reports are as follows:  Chest Xray(s):    FAST exam(s): N/A   CT Scan(s): HCT - Serpiginous asymmetric hyperattenuation in the posterior right parietal lobe may reflect a small focus of subarachnoid hemorrhage. No intraparenchymal hemorrhage or significant mass effect.      Additional Xray(s): Right ribs - neg     Other Studies:

## 2025-05-12 NOTE — Clinical Note
The PACER GENERATOR MAVIS XT DR MELODY SAUNDERS - [id number] device was inserted. The leads were placed into the connector and visually verified to be in correct position. Injury current obtained.

## 2025-05-12 NOTE — ED PROVIDER NOTES
Time reflects when diagnosis was documented in both MDM as applicable and the Disposition within this note       Time User Action Codes Description Comment    5/12/2025  3:33 PM Blas Martinez Add [I60.9] Subarachnoid hemorrhage (HCC)     5/12/2025  3:33 PM Blas Martinez Add [R55] Syncope     5/12/2025  3:33 PM Blas Martinez Add [T14.8XXA] Abrasion     5/12/2025  3:33 PM Blas Martinez Add [R03.0] Elevated blood pressure reading           ED Disposition       ED Disposition   Admit    Condition   Stable    Date/Time   Mon May 12, 2025  3:41 PM    Comment   Case was discussed with Trauma and the patient's admission status was agreed to be  to the service of Dr. Martinez .               Assessment & Plan       Medical Decision Making  Patient is a 79-year-old male, with a history significant for hypertension, hyperlipidemia, tobacco use per review the medical record, who presents to the ED today for evaluation of syncope.  Patient states event occurred Saturday.  He was in his usual state of health until noon on Saturday when, while standing (patient explicit that he had not just stood up quickly and was not exerting himself) he felt sudden onset lightheadedness.  Patient's wife, present in room and finding collateral history, states patient subsequently syncopized and had a few second episode of twitching.  No postictal interval/tongue biting/incontinence.  Patient did not seek evaluation until today because, [facility] he [facility] from [facility] prior to seeking care here.  He received this today.  Patient states that, over last couple of months, he has had intermittent episodes of lightheadedness like this but never syncopized before this.  No history of seizures.  Patient fell back and hit his back on a bookshelf which broke his fall.  Denies head strike/antiplatelet use/anticoagulant use.  Patient denies chest pain, dyspnea, abdominal pain, dysuria, polyuria,  weakness, numbness.  Patient does report dark urine.  No clear exacerbating remitting factors.  Patient's wife states that she had a brain tumor in the past and this presented as seizures and she is concerned that this may be what happened with her .  Patient is currently afebrile, bradycardic, hemodynamically stable.  Physical exam is notable for clear heart and lungs, soft nontender abdomen, tenderness palpation of the right thigh, abrasion on back.  This presentation is concerning for: Sprain, strain, contusion, fracture, syncope, arrhythmia, ACS, electrolyte abnormality, anemia, dehydration, rhabdomyolysis.  Mass lesion/ICH also considered.  Doubt seizure based on history/physical exam.  Will investigate with cardiac workup, CK, CT head.  Will manage with tetanus update, fluids, further based upon workup.    Amount and/or Complexity of Data Reviewed  Labs: ordered. Decision-making details documented in ED Course.  Radiology: ordered and independent interpretation performed.    Risk  Prescription drug management.  Decision regarding hospitalization.        ED Course as of 05/12/25 1541   Mon May 12, 2025   1258 ECG per my independent interpretation: Bradycardic rate, regular rhythm, no ectopy, leftward axis, no ST elevations or depressions. RBBB.  QRS prolonged.  QTc within normal limits.  No morphology consistent with Brugada, arrhythmoenic right ventricular dysplasia, WPW   1405 Hemoglobin: 12.6  WNL    1414 Total CK: 131  WNL    1530 On reevaluation, patient confirms that he is on neither antiplatelet nor anticoagulant medications.  Patient made NPO.  Head of bed elevated to 30 degrees.  Will discuss with trauma surgery       Medications   sodium chloride 0.9 % bolus 250 mL (0 mL Intravenous Stopped 5/12/25 1522)   tetanus-diphtheria-acellular pertussis (BOOSTRIX) IM injection 0.5 mL (0.5 mL Intramuscular Given 5/12/25 1402)       ED Risk Strat Scores                    No data  "recorded                            History of Present Illness       Chief Complaint   Patient presents with    Syncope     Syncope on Saturday. Fell backwards and per wife he did have some mild shaking for a couple seconds. Pt his his back but denies hitting his head. No thinners. No asa.Pt denies any symptoms or syncope since Saturday        Past Medical History:   Diagnosis Date    Grimaldo esophagus     EGD with RFA    Colon polyp     Decreased bone density     Disease of thyroid gland     hyperthyroid-goiter-thyroidectomy    Erectile dysfunction     Full dentures     GERD (gastroesophageal reflux disease)     Hyperlipidemia     Hypertension     Kyphosis     Osteopenia     Osteoporosis     Scoliosis     Seasonal allergies     Spondylosis     Wears glasses       Past Surgical History:   Procedure Laterality Date    COLONOSCOPY      EGD      RFA x2  and , 3/2022 (RFA not done)    PARATHYROID GLAND SURGERY      2 removed 2 remain    THYROIDECTOMY      total    TONSILLECTOMY      as child      Family History   Problem Relation Age of Onset    Colon cancer Father     Cancer Father         colon    Heart disease Father     Thyroid disease Mother         goiter    Other Sister         something like \"alzheimers\"    Cancer Paternal Aunt         colon    Thyroid disease Sister       Social History     Tobacco Use    Smoking status: Former     Current packs/day: 0.00     Types: Cigarettes     Quit date:      Years since quittin.3    Smokeless tobacco: Never   Vaping Use    Vaping status: Every Day    Substances: THC   Substance Use Topics    Alcohol use: Yes     Alcohol/week: 7.0 standard drinks of alcohol     Types: 7 Glasses of wine per week     Comment: 1 glass with dinner    Drug use: Not Currently     Frequency: 4.0 times per week     Types: Marijuana     Comment: smokes joints prior to using e-cigarettes THC      E-Cigarette/Vaping    E-Cigarette Use Current Every Day User     Comments at night     "   E-Cigarette/Vaping Substances    Nicotine No     THC Yes     CBD No     Flavoring No     Other No     Unknown No       I have reviewed and agree with the history as documented.     Patient is a 79-year-old male, with a history significant for hypertension, hyperlipidemia, tobacco use per review the medical record, who presents to the ED today for evaluation of syncope.  Patient states event occurred Saturday.  He was in his usual state of health until noon on Saturday when, while standing (patient explicit that he had not just stood up quickly and was not exerting himself) he felt sudden onset lightheadedness.  Patient's wife, present in room and finding collateral history, states patient subsequently syncopized and had a few second episode of twitching.  No postictal interval/tongue biting/incontinence.  Patient did not seek evaluation until today because, [facility] he want to get [facility] from them prior to seeking care here.  He received this today.  Patient states that, over last couple of months, he has had intermittent episodes of lightheadedness like this but never syncopized before this.  No history of seizures.  Patient fell back and hit his back on a bookshelf which broke his fall.  Denies head strike/antiplatelet use/anticoagulant use.  Patient denies chest pain, dyspnea, abdominal pain, dysuria, polyuria, weakness, numbness.  Patient does report dark urine.  No clear exacerbating remitting factors.  Patient's wife states that she had a brain tumor in the past and this presented as seizures and she is concerned that this may be what happened with her .  Patient is without other concerns at this time.        Review of Systems   Constitutional:  Negative for fever.   HENT:  Negative for trouble swallowing.    Eyes:  Negative for visual disturbance.   Respiratory:  Negative for shortness of breath.    Cardiovascular:  Negative for chest pain.   Gastrointestinal:  Negative for  abdominal pain.   Endocrine: Negative for polyuria.   Genitourinary:  Negative for dysuria.   Musculoskeletal:  Negative for gait problem.   Skin:  Negative for rash.   Allergic/Immunologic: Negative for environmental allergies.   Neurological:  Positive for syncope and light-headedness. Negative for weakness and numbness.   Psychiatric/Behavioral:  Negative for confusion.    All other systems reviewed and are negative.          Objective       ED Triage Vitals [05/12/25 1211]   Temperature Pulse Blood Pressure Respirations SpO2 Patient Position - Orthostatic VS   (!) 97.4 °F (36.3 °C) 58 (!) 199/86 18 98 % Sitting      Temp Source Heart Rate Source BP Location FiO2 (%) Pain Score    Oral Monitor Right arm -- --      Vitals      Date and Time Temp Pulse SpO2 Resp BP Pain Score FACES Pain Rating User   05/12/25 1415 -- -- -- 18 176/75 -- -- ML   05/12/25 1330 -- 50 99 % 18 153/66 -- -- ML   05/12/25 1300 -- 48 100 % 18 149/71 -- -- ML   05/12/25 1245 -- 50 100 % 18 191/83 -- -- ML   05/12/25 1211 97.4 °F (36.3 °C) 58 98 % 18 199/86 -- -- TP            Physical Exam  Vitals and nursing note reviewed.   Constitutional:       General: He is not in acute distress.     Appearance: He is not toxic-appearing.      Comments: Patient appears comfortable during my evaluation    HENT:      Head: Normocephalic and atraumatic.      Right Ear: External ear normal.      Left Ear: External ear normal.      Nose: Nose normal. No rhinorrhea.      Mouth/Throat:      Mouth: Mucous membranes are moist.      Pharynx: Oropharynx is clear. No oropharyngeal exudate or posterior oropharyngeal erythema.      Comments: Uvula midline. No oropharyngeal or submandibular mass/swelling  Eyes:      General: No scleral icterus.        Right eye: No discharge.         Left eye: No discharge.      Conjunctiva/sclera: Conjunctivae normal.      Pupils: Pupils are equal, round, and reactive to light.   Neck:      Comments: Patient is spontaneously rotating  their neck to the left and right during the history and physical exam interaction without difficulty or apparent discomfort    Cardiovascular:      Rate and Rhythm: Normal rate and regular rhythm.      Pulses: Normal pulses.      Heart sounds: Normal heart sounds. No murmur heard.     No friction rub. No gallop.      Comments: 2+ Radial  Pulmonary:      Effort: Pulmonary effort is normal. No respiratory distress.      Breath sounds: Normal breath sounds. No stridor. No wheezing, rhonchi or rales.   Abdominal:      General: Abdomen is flat. There is no distension.      Palpations: Abdomen is soft.      Tenderness: There is no abdominal tenderness. There is no right CVA tenderness, left CVA tenderness, guarding or rebound.   Musculoskeletal:         General: Tenderness present. No deformity.      Cervical back: Neck supple. No tenderness. No muscular tenderness.      Comments: Tenderness to palpation of the lateral right thigh.  Soft compartments/no pain or proportion to exam.  Superficial abrasion without signs of superinfection on back.    No tenderness to palpation of the bilateral shoulders, elbows, arms, knees, legs. No chest wall or pelvic tenderness to palpation   No midline C, T, L spine tenderness to palpation     Lymphadenopathy:      Cervical: No cervical adenopathy.   Skin:     General: Skin is warm and dry.      Capillary Refill: Capillary refill takes less than 2 seconds.   Neurological:      Mental Status: He is alert.      Comments: Patient is speaking clearly in complete sentences.  Patient is answering appropriately and able follow commands.  Patient is moving all four extremities spontaneously.  No facial droop.  Tongue midline.      Psychiatric:         Mood and Affect: Mood normal.         Behavior: Behavior normal.         Results Reviewed       Procedure Component Value Units Date/Time    HS Troponin I 4hr [[id number]]     Lab Status: No result Specimen: Blood     HS Troponin I 2hr [[id number]]  Collected: 05/12/25 1522    Lab Status: In process Specimen: Blood from Arm, Left Updated: 05/12/25 1525    HS Troponin 0hr (reflex protocol) [[id number]]  (Normal) Collected: 05/12/25 1338    Lab Status: Final result Specimen: Blood from Arm, Left Updated: 05/12/25 1419     hs TnI 0hr 5 ng/L     Comprehensive metabolic panel [[id number]]  (Abnormal) Collected: 05/12/25 1338    Lab Status: Final result Specimen: Blood from Arm, Left Updated: 05/12/25 1412     Sodium 141 mmol/L      Potassium 4.4 mmol/L      Chloride 109 mmol/L      CO2 27 mmol/L      ANION GAP 5 mmol/L      BUN 25 mg/dL      Creatinine 0.87 mg/dL      Glucose 87 mg/dL      Calcium 9.3 mg/dL      AST 13 U/L      ALT 10 U/L      Alkaline Phosphatase 49 U/L      Total Protein 6.5 g/dL      Albumin 4.1 g/dL      Total Bilirubin 0.36 mg/dL      eGFR 82 ml/min/1.73sq m     Narrative:      [employer] guidelines for Chronic Kidney Disease (CKD):     Stage 1 with normal or high GFR (GFR > 90 mL/min/1.73 square meters)    Stage 2 Mild CKD (GFR = 60-89 mL/min/1.73 square meters)    Stage 3A Moderate CKD (GFR = 45-59 mL/min/1.73 square meters)    Stage 3B Moderate CKD (GFR = 30-44 mL/min/1.73 square meters)    Stage 4 Severe CKD (GFR = 15-29 mL/min/1.73 square meters)    Stage 5 End Stage CKD (GFR <15 mL/min/1.73 square meters)  Note: GFR calculation is accurate only with a steady state creatinine    CK [[id number]]  (Normal) Collected: 05/12/25 1338    Lab Status: Final result Specimen: Blood from Arm, Left Updated: 05/12/25 1412     Total  U/L     CBC and differential [[id number]]  (Abnormal) Collected: 05/12/25 1338    Lab Status: Final result Specimen: Blood from Arm, Left Updated: 05/12/25 1346     WBC 7.20 Thousand/uL      RBC 4.10 Million/uL      Hemoglobin 12.6 g/dL      Hematocrit 38.5 %      MCV 94 fL      MCH 30.7 pg      MCHC 32.7 g/dL      RDW 13.8 %      MPV 8.8 fL      Platelets 325 Thousands/uL      nRBC 0 /100 WBCs       Segmented % 63 %      Immature Grans % 0 %      Lymphocytes % 23 %      Monocytes % 12 %      Eosinophils Relative 1 %      Basophils Relative 1 %      Absolute Neutrophils 4.60 Thousands/µL      Absolute Immature Grans 0.02 Thousand/uL      Absolute Lymphocytes 1.63 Thousands/µL      Absolute Monocytes 0.85 Thousand/µL      Eosinophils Absolute 0.05 Thousand/µL      Basophils Absolute 0.05 Thousands/µL             CT head without contrast   Final Interpretation by Natividad Duenas MD (05/12 6208)   Serpiginous asymmetric hyperattenuation in the posterior right parietal lobe may reflect a small focus of subarachnoid hemorrhage. No intraparenchymal hemorrhage or significant mass effect.      Moderate white matter changes.         I personally discussed this study via [employer] secure chat with BLAS MARTINEZ on 5/12/2025 3:23 PM.                  Resident: Guy Connelly      I, the attending radiologist, have reviewed the images and agree with the final report above.      Workstation performed: [id number]         XR chest 2 views   ED Interpretation by Blas Martinez MD (05/12 0503)   Per my independent interpretation: No acute cardiopulmonary process      XR femur 2 views RIGHT   ED Interpretation by Blas Martinez MD (05/12 9094)   Per my independent interpretation: No acute osseous abnormality          Procedures    ED Medication and Procedure Management   Prior to Admission Medications   Prescriptions Last Dose Informant Patient Reported? Taking?   ATORVASTATIN CALCIUM PO  Self Yes No   Sig: Take 20 mg by mouth daily at bedtime   Cholecalciferol (Vitamin D) 50 MCG (2000 UT) CAPS  Self Yes No   Sig: Take 2,000 Units by mouth 2 (two) times a day   calcium carbonate (OS-JACOB) 1250 (500 Ca) MG chewable tablet  Self Yes No   Sig: Chew daily   famotidine (PEPCID) 20 mg tablet  Self No No   Sig: Take 1 tablet (20 mg total) by mouth 2 (two) times a day   levothyroxine 175 mcg tablet  Self Yes No   Sig: Take  175 mcg by mouth every morning    losartan (COZAAR) 25 mg tablet  Self Yes No   Sig: Take 12.5 mg by mouth every morning    omeprazole (PriLOSEC) 20 mg delayed release capsule   No No   Sig: Take 2 capsules (40 mg total) by mouth 2 (two) times a day   sildenafil (VIAGRA) 100 mg tablet  Self Yes No   Sig: as needed      Facility-Administered Medications: None     Current Discharge Medication List        CONTINUE these medications which have NOT CHANGED    Details   ATORVASTATIN CALCIUM PO Take 20 mg by mouth daily at bedtime      calcium carbonate (OS-JACOB) 1250 (500 Ca) MG chewable tablet Chew daily      Cholecalciferol (Vitamin D) 50 MCG (2000 UT) CAPS Take 2,000 Units by mouth 2 (two) times a day      famotidine (PEPCID) 20 mg tablet Take 1 tablet (20 mg total) by mouth 2 (two) times a day  Qty: 60 tablet, Refills: 11    Associated Diagnoses: 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  ED SEPSIS DOCUMENTATION   Time reflects when diagnosis was documented in both MDM as applicable and the Disposition within this note       Time User Action Codes Description Comment    5/12/2025  3:33 PM Blas Martinez [I60.9] Subarachnoid hemorrhage (HCC)     5/12/2025  3:33 PM Blas Martinez Add [R55] Syncope     5/12/2025  3:33 PM Blas Martinez Add [T14.8XXA] Abrasion     5/12/2025  3:33 PM Blas Martinez [R03.0] Elevated blood pressure reading                  Blas Martinez MD  05/12/25 1103

## 2025-05-12 NOTE — Clinical Note
Received to [facility] procedure room, spoke with provider, ZACH Perez MD and informed consent obtained for procedure, all questions answered.  Pleasant cooperative talkative ayde appropriate..  Engaged with communicating and understands asking questions freely.  Without current complaints.  Respirations without effort.

## 2025-05-13 ENCOUNTER — APPOINTMENT (OUTPATIENT)
Dept: RADIOLOGY | Facility: HOSPITAL | Age: 80
DRG: 242 | End: 2025-05-13
Payer: COMMERCIAL

## 2025-05-13 ENCOUNTER — APPOINTMENT (OUTPATIENT)
Dept: NON INVASIVE DIAGNOSTICS | Facility: HOSPITAL | Age: 80
DRG: 242 | End: 2025-05-13
Payer: COMMERCIAL

## 2025-05-13 ENCOUNTER — APPOINTMENT (OUTPATIENT)
Dept: CT IMAGING | Facility: HOSPITAL | Age: 80
DRG: 242 | End: 2025-05-13
Payer: COMMERCIAL

## 2025-05-13 PROBLEM — R00.1 BRADYCARDIA: Status: ACTIVE | Noted: 2025-05-13

## 2025-05-13 PROBLEM — I45.5 SINUS PAUSE: Status: ACTIVE | Noted: 2025-05-13
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
## 2025-05-13 PROBLEM — I60.9 SAH (SUBARACHNOID HEMORRHAGE) (HCC): Status: ACTIVE | Noted: 2025-05-13

## 2025-05-13 LAB
ALBUMIN SERPL BCG-MCNC: 3.7 G/DL (ref 3.5–5)
ALP SERPL-CCNC: 46 U/L (ref 34–104)
ALT SERPL W P-5'-P-CCNC: 10 U/L (ref 7–52)
ANION GAP SERPL CALCULATED.3IONS-SCNC: 7 MMOL/L (ref 4–13)
AORTIC ROOT: 3.6 CM
AORTIC VALVE MEAN VELOCITY: 13.9 M/S
ASCENDING AORTA: 3.4 CM
AST SERPL W P-5'-P-CCNC: 12 U/L (ref 13–39)
AV AREA BY CONTINUOUS VTI: 2.3 CM2
AV AREA PEAK VELOCITY: 2.8 CM2
AV LVOT MEAN GRADIENT: 4 MMHG
AV LVOT PEAK GRADIENT: 9 MMHG
AV MEAN PRESS GRAD SYS DOP V1V2: 9 MMHG
AV ORIFICE AREA US: 2.33 CM2
AV PEAK GRADIENT: 17 MMHG
AV VELOCITY RATIO: 0.61
AV VMAX SYS DOP: 2.04 M/S
BASOPHILS # BLD AUTO: 0.08 THOUSANDS/ÂΜL (ref 0–0.1)
BASOPHILS NFR BLD AUTO: 1 % (ref 0–1)
BILIRUB SERPL-MCNC: 0.31 MG/DL (ref 0.2–1)
BSA FOR ECHO PROCEDURE: 1.9 M2
BUN SERPL-MCNC: 22 MG/DL (ref 5–25)
CALCIUM SERPL-MCNC: 8.8 MG/DL (ref 8.4–10.2)
CHLORIDE SERPL-SCNC: 108 MMOL/L (ref 96–108)
CO2 SERPL-SCNC: 24 MMOL/L (ref 21–32)
CREAT SERPL-MCNC: 0.94 MG/DL (ref 0.6–1.3)
DOP CALC AO VTI: 47.59 CM
DOP CALC LVOT AREA: 3.8 CM2
DOP CALC LVOT CARDIAC INDEX: 3.56 L/MIN/M2
DOP CALC LVOT CARDIAC OUTPUT: 6.76 L/MIN
DOP CALC LVOT DIAMETER: 2.2 CM
DOP CALC LVOT PEAK VEL VTI: 29.18 CM
DOP CALC LVOT PEAK VEL: 1.52 M/S
DOP CALC LVOT STROKE INDEX: 56.8 ML/M2
DOP CALC LVOT STROKE VOLUME: 110.87
E WAVE DECELERATION TIME: 195 MS
E/A RATIO: 0.8
EOSINOPHIL # BLD AUTO: 0.07 THOUSAND/ÂΜL (ref 0–0.61)
EOSINOPHIL NFR BLD AUTO: 1 % (ref 0–6)
ERYTHROCYTE [DISTWIDTH] IN BLOOD BY AUTOMATED COUNT: 13.9 % (ref 11.6–15.1)
FRACTIONAL SHORTENING: 32 (ref 28–44)
GFR SERPL CREATININE-BSD FRML MDRD: 77 ML/MIN/1.73SQ M
GLUCOSE SERPL-MCNC: 125 MG/DL (ref 65–140)
GLUCOSE SERPL-MCNC: 182 MG/DL (ref 65–140)
GLUCOSE SERPL-MCNC: 184 MG/DL (ref 65–140)
GLUCOSE SERPL-MCNC: 77 MG/DL (ref 65–140)
HCT VFR BLD AUTO: 36 % (ref 36.5–49.3)
HGB BLD-MCNC: 12 G/DL (ref 12–17)
IMM GRANULOCYTES # BLD AUTO: 0.07 THOUSAND/UL (ref 0–0.2)
IMM GRANULOCYTES NFR BLD AUTO: 1 % (ref 0–2)
INTERVENTRICULAR SEPTUM IN DIASTOLE (PARASTERNAL SHORT AXIS VIEW): 1.3 CM
INTERVENTRICULAR SEPTUM: 1.3 CM (ref 0.6–1.1)
LAAS-AP2: 19.4 CM2
LAAS-AP4: 22.2 CM2
LEFT ATRIUM SIZE: 4.4 CM
LEFT ATRIUM VOLUME (MOD BIPLANE): 65 ML
LEFT ATRIUM VOLUME INDEX (MOD BIPLANE): 34.2 ML/M2
LEFT INTERNAL DIMENSION IN SYSTOLE: 3 CM (ref 2.1–4)
LEFT VENTRICULAR INTERNAL DIMENSION IN DIASTOLE: 4.4 CM (ref 3.5–6)
LEFT VENTRICULAR POSTERIOR WALL IN END DIASTOLE: 1.1 CM
LEFT VENTRICULAR STROKE VOLUME: 54 ML
LV EF US.2D.A4C+ESTIMATED: 57 %
LVSV (TEICH): 54 ML
LYMPHOCYTES # BLD AUTO: 2.29 THOUSANDS/ÂΜL (ref 0.6–4.47)
LYMPHOCYTES NFR BLD AUTO: 15 % (ref 14–44)
MAGNESIUM SERPL-MCNC: 2.1 MG/DL (ref 1.9–2.7)
MCH RBC QN AUTO: 31.3 PG (ref 26.8–34.3)
MCHC RBC AUTO-ENTMCNC: 33.3 G/DL (ref 31.4–37.4)
MCV RBC AUTO: 94 FL (ref 82–98)
MONOCYTES # BLD AUTO: 1.93 THOUSAND/ÂΜL (ref 0.17–1.22)
MONOCYTES NFR BLD AUTO: 13 % (ref 4–12)
MV E'TISSUE VEL-LAT: 10 CM/S
MV E'TISSUE VEL-SEP: 8 CM/S
MV PEAK A VEL: 1.11 M/S
MV PEAK E VEL: 89 CM/S
MV STENOSIS PRESSURE HALF TIME: 57 MS
MV VALVE AREA P 1/2 METHOD: 3.86
NEUTROPHILS # BLD AUTO: 10.65 THOUSANDS/ÂΜL (ref 1.85–7.62)
NEUTS SEG NFR BLD AUTO: 69 % (ref 43–75)
NRBC BLD AUTO-RTO: 0 /100 WBCS
PHOSPHATE SERPL-MCNC: 3.6 MG/DL (ref 2.3–4.1)
PLATELET # BLD AUTO: 356 THOUSANDS/UL (ref 149–390)
PMV BLD AUTO: 9.1 FL (ref 8.9–12.7)
POTASSIUM SERPL-SCNC: 4 MMOL/L (ref 3.5–5.3)
PROT SERPL-MCNC: 5.9 G/DL (ref 6.4–8.4)
RA PRESSURE ESTIMATED: 5 MMHG
RBC # BLD AUTO: 3.84 MILLION/UL (ref 3.88–5.62)
RIGHT ATRIAL 2D VOLUME: 42 ML
RIGHT ATRIUM AREA SYSTOLE A4C: 16.9 CM2
RIGHT VENTRICLE ID DIMENSION: 3.9 CM
RV PSP: 32 MMHG
SL CV LEFT ATRIUM LENGTH A2C: 5.2 CM
SL CV LV EF: 60
SL CV PED ECHO LEFT VENTRICLE DIASTOLIC VOLUME (MOD BIPLANE) 2D: 89 ML
SL CV PED ECHO LEFT VENTRICLE SYSTOLIC VOLUME (MOD BIPLANE) 2D: 35 ML
SODIUM SERPL-SCNC: 139 MMOL/L (ref 135–147)
TR MAX PG: 27 MMHG
TR PEAK VELOCITY: 2.6 M/S
TRICUSPID ANNULAR PLANE SYSTOLIC EXCURSION: 2.5 CM
TRICUSPID VALVE PEAK REGURGITATION VELOCITY: 2.62 M/S
WBC # BLD AUTO: 15.09 THOUSAND/UL (ref 4.31–10.16)

## 2025-05-13 PROCEDURE — 93306 TTE W/DOPPLER COMPLETE: CPT | Performed by: INTERNAL MEDICINE

## 2025-05-13 PROCEDURE — 85025 COMPLETE CBC W/AUTO DIFF WBC: CPT

## 2025-05-13 PROCEDURE — 93306 TTE W/DOPPLER COMPLETE: CPT

## 2025-05-13 PROCEDURE — 99223 1ST HOSP IP/OBS HIGH 75: CPT | Performed by: INTERNAL MEDICINE

## 2025-05-13 PROCEDURE — 33210 INSERT ELECTRD/PM CATH SNGL: CPT | Performed by: STUDENT IN AN ORGANIZED HEALTH CARE EDUCATION/TRAINING PROGRAM

## 2025-05-13 PROCEDURE — 5A1223Z PERFORMANCE OF CARDIAC PACING, CONTINUOUS: ICD-10-PCS | Performed by: STUDENT IN AN ORGANIZED HEALTH CARE EDUCATION/TRAINING PROGRAM

## 2025-05-13 PROCEDURE — 97163 PT EVAL HIGH COMPLEX 45 MIN: CPT

## 2025-05-13 PROCEDURE — 82948 REAGENT STRIP/BLOOD GLUCOSE: CPT

## 2025-05-13 PROCEDURE — 99222 1ST HOSP IP/OBS MODERATE 55: CPT | Performed by: PHYSICIAN ASSISTANT

## 2025-05-13 PROCEDURE — 99291 CRITICAL CARE FIRST HOUR: CPT | Performed by: STUDENT IN AN ORGANIZED HEALTH CARE EDUCATION/TRAINING PROGRAM

## 2025-05-13 PROCEDURE — 83735 ASSAY OF MAGNESIUM: CPT

## 2025-05-13 PROCEDURE — 84100 ASSAY OF PHOSPHORUS: CPT

## 2025-05-13 PROCEDURE — 80053 COMPREHEN METABOLIC PANEL: CPT

## 2025-05-13 PROCEDURE — 70450 CT HEAD/BRAIN W/O DYE: CPT

## 2025-05-13 PROCEDURE — 93306 TTE W/DOPPLER COMPLETE: CPT | Performed by: STUDENT IN AN ORGANIZED HEALTH CARE EDUCATION/TRAINING PROGRAM

## 2025-05-13 PROCEDURE — 71045 X-RAY EXAM CHEST 1 VIEW: CPT

## 2025-05-13 PROCEDURE — 93005 ELECTROCARDIOGRAM TRACING: CPT

## 2025-05-13 PROCEDURE — 97167 OT EVAL HIGH COMPLEX 60 MIN: CPT

## 2025-05-13 RX ORDER — CEFAZOLIN SODIUM 2 G/50ML
2000 SOLUTION INTRAVENOUS ONCE
Status: DISCONTINUED | OUTPATIENT
Start: 2025-05-14 | End: 2025-05-14 | Stop reason: HOSPADM

## 2025-05-13 RX ORDER — PANTOPRAZOLE SODIUM 40 MG/1
40 TABLET, DELAYED RELEASE ORAL
Status: DISCONTINUED | OUTPATIENT
Start: 2025-05-14 | End: 2025-05-15 | Stop reason: HOSPADM

## 2025-05-13 RX ORDER — ACETAMINOPHEN 325 MG/1
650 TABLET ORAL EVERY 6 HOURS PRN
Status: DISCONTINUED | OUTPATIENT
Start: 2025-05-13 | End: 2025-05-14

## 2025-05-13 RX ORDER — ENOXAPARIN SODIUM 100 MG/ML
30 INJECTION SUBCUTANEOUS EVERY 12 HOURS SCHEDULED
Status: DISCONTINUED | OUTPATIENT
Start: 2025-05-13 | End: 2025-05-15 | Stop reason: HOSPADM

## 2025-05-13 RX ADMIN — PANTOPRAZOLE SODIUM 40 MG: 40 TABLET, DELAYED RELEASE ORAL at 05:39

## 2025-05-13 RX ADMIN — LEVETIRACETAM 500 MG: 500 TABLET, FILM COATED ORAL at 08:32

## 2025-05-13 RX ADMIN — ACETAMINOPHEN 975 MG: 325 TABLET, FILM COATED ORAL at 01:24

## 2025-05-13 RX ADMIN — ENOXAPARIN SODIUM 30 MG: 30 INJECTION SUBCUTANEOUS at 22:05

## 2025-05-13 RX ADMIN — LOSARTAN POTASSIUM 12.5 MG: 25 TABLET, FILM COATED ORAL at 08:32

## 2025-05-13 RX ADMIN — LEVOTHYROXINE SODIUM 175 MCG: 175 TABLET ORAL at 05:39

## 2025-05-13 RX ADMIN — ACETAMINOPHEN 975 MG: 325 TABLET, FILM COATED ORAL at 08:32

## 2025-05-13 RX ADMIN — FAMOTIDINE 20 MG: 20 TABLET, FILM COATED ORAL at 08:32

## 2025-05-13 RX ADMIN — LEVETIRACETAM 500 MG: 500 TABLET, FILM COATED ORAL at 21:32

## 2025-05-13 RX ADMIN — ATORVASTATIN CALCIUM 20 MG: 20 TABLET, FILM COATED ORAL at 21:32

## 2025-05-13 RX ADMIN — EPINEPHRINE 2 MCG/MIN: 1 INJECTION INTRAMUSCULAR; INTRAVENOUS; SUBCUTANEOUS at 00:37

## 2025-05-13 RX ADMIN — ACETAMINOPHEN 650 MG: 325 TABLET, FILM COATED ORAL at 14:27

## 2025-05-13 NOTE — CONSULTS
Consultation - Critical Care/ICU   Name: Leeroy Santos 79 y.o. male I MRN: [id number]  Unit/Bed#: ICU 13 I Date of Admission: 5/12/2025   Date of Service: 5/13/2025 I Hospital Day: 0   Consults  Physician Requesting Evaluation: Frederic Martinez DO   Reason for Evaluation / Principal Problem: Bradycardia         Assessment & Plan  SAH (subarachnoid hemorrhage) (HCC)  CT Head: Serpiginous asymmetric hyperattenuation in the posterior right parietal lobe may reflect a small focus of subarachnoid hemorrhage. No intraparenchymal hemorrhage or significant mass effect.   S/P syncopal falls at home.   Delayed presentation to hospital given unsure policy with [facility] insurance     Plan:  Keppra for Seizure ppx   Q 1 hour neuro checks   STAT CT scan for change in GCS > 2   Repeat scan in AM to eval for stability   Syncopal workup   ECHO pending  Seizure precautions   Monitor telemetry   Hold DVT till cleared by neurosurgery   HTN (hypertension)  Continue losartan   Hyperlipidemia  Continue statin  Sinus pause  Frequent episodes of ventricular standstill   Started epi gtt with improvement     Plan:   Continue Epi gtt  Maintain pacer pads   Cardiology consult   Maintain electrolytes   Bradycardia  Into the 30s with what appears to be periods ventricular standstill   Placed on epi gtt with improvement in HR   BP stable     Plan:  Cardiology consult   ECHO pending   Consider EP  eval   Hold all beta blockers   Likely will require PPM   Maintain pacer pads   Fall  Syncopal fall at home. Feels his head get fuzzy and then falls. Once had brief LOC.   Delayed presentation  Injury: small SAH right parietal lobe.     Plan:   PT/OT when able  PMR consult   Fall Pecautions   Disposition: Critical care    History of Present Illness   Leeroy Santos is a 79 y.o. who presents to the hospital s/p 2 syncopal falls at home. PMH only notable for HTN and HLD. Endorsed falling twice at home. He would get a funny, fuzzy feeling like something is  being pulled over his brain and then synopsize. Once had + LOC. CT head revealed a small SAH. Overnight patient experienced profound bradycardia to the 30s with frequent episodes that appear to be ventricular standstill. Started Epi gtt with HR improvement to 50s. Not previous on betablockers. Remained hemodynamically stable. Not endorsing any symptoms at time of events.                   History obtained from chart review and the patient.  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      Comment:  EGD with RFA  No date: Colon polyp  No date: Decreased bone density  No date: Disease of thyroid gland      Comment:  hyperthyroid-goiter-thyroidectomy  No date: Erectile dysfunction  No date: Full dentures  No date: GERD (gastroesophageal reflux disease)  No date: Hyperlipidemia  No date: Hypertension  No date: Kyphosis  No date: Osteopenia  No date: Osteoporosis  No date: Scoliosis  No date: Seasonal allergies  No date: Spondylosis  No date: Wears glasses Past Surgical History:  No date: COLONOSCOPY  No date: EGD      Comment:  RFA x2 2020 and 2021, 3/2022 (RFA not done)  No date: PARATHYROID GLAND SURGERY      Comment:  2 removed 2 remain  No date: THYROIDECTOMY      Comment:  total  No date: TONSILLECTOMY      Comment:  as child   Current Outpatient Medications   Medication Instructions    ATORVASTATIN CALCIUM PO 20 mg, Daily at bedtime    calcium carbonate (OS-JACOB) 1250 (500 Ca) MG chewable tablet Daily    famotidine (PEPCID) 20 mg, Oral, 2 times daily    levothyroxine 175 mcg, Every morning    losartan (COZAAR) 12.5 mg, Every morning    omeprazole (PRILOSEC) 40 mg, Oral, 2 times daily    sildenafil (VIAGRA) 100 mg tablet As needed    Vitamin D 2,000 Units, 2 times daily    No Known Allergies   Social History     Tobacco Use    Smoking status: Former     Current packs/day: 0.00     Types: Cigarettes     Quit date: 2008     Years since quitting:  "17.3    Smokeless tobacco: Never   Vaping Use    Vaping status: Every Day    Substances: THC   Substance Use Topics    Alcohol use: Yes     Alcohol/week: 7.0 standard drinks of alcohol     Types: 7 Glasses of wine per week     Comment: 1 glass with dinner    Drug use: Not Currently     Frequency: 4.0 times per week     Types: Marijuana     Comment: smokes joints prior to using e-cigarettes THC    Family History   Problem Relation Age of Onset    Colon cancer Father     Cancer Father         colon    Heart disease Father     Thyroid disease Mother         goiter    Other Sister         something like \"alzheimers\"    Cancer Paternal Aunt         colon    Thyroid disease Sister           Objective :                   Vitals I/O      Most Recent Min/Max in 24hrs   Temp 99.1 °F (37.3 °C) Temp  Min: 97.4 °F (36.3 °C)  Max: 99.1 °F (37.3 °C)   Pulse 61 Pulse  Min: 42  Max: 90   Resp 19 Resp  Min: 16  Max: 34   /60 BP  Min: 124/60  Max: 199/86   O2 Sat 98 % SpO2  Min: 96 %  Max: 100 %      Intake/Output Summary (Last 24 hours) at 5/13/2025 0448  Last data filed at 5/13/2025 0439  Gross per 24 hour   Intake 730 ml   Output 650 ml   Net 80 ml       Diet Regular; Regular House    Invasive Monitoring           Physical Exam   Physical Exam  Skin:     General: Skin is warm and dry.      Capillary Refill: Capillary refill takes less than 2 seconds.   HENT:      Head: Normocephalic.      Mouth/Throat:      Mouth: Mucous membranes are moist.   Cardiovascular:      Rate and Rhythm: Bradycardia present.      Heart sounds: Murmur heard.   Musculoskeletal:      Right lower leg: No edema.      Left lower leg: No edema.   Abdominal:      Palpations: Abdomen is soft.   Constitutional:       General: He is not in acute distress.     Appearance: He is well-developed and well-nourished.   Pulmonary:      Effort: Pulmonary effort is normal.      Breath sounds: Normal breath sounds.   Neurological:      General: No focal deficit present. "      Mental Status: He is alert and oriented to person, place and time.      Motor: gross motor function is at baseline for patient. Strength full and intact in all extremities.          Diagnostic Studies        Lab Results: I have reviewed the following results:     Medications:  Scheduled PRN   acetaminophen, 975 mg, Q8H MARLA  atorvastatin, 20 mg, HS  famotidine, 20 mg, Daily  levETIRAcetam, 500 mg, Q12H MARLA  levothyroxine, 175 mcg, Early Morning  losartan, 12.5 mg, QAM  pantoprazole, 40 mg, Early Morning          Continuous    epinephrine, 1-10 mcg/min, Last Rate: 6 mcg/min (05/13/25 0139)         Labs:   CBC    Recent Labs     05/12/25  1338 05/13/25  0425   WBC 7.20 15.09*   HGB 12.6 12.0   HCT 38.5 36.0*    356     BMP    Recent Labs     05/12/25  1338   SODIUM 141   K 4.4   *   CO2 27   AGAP 5   BUN 25   CREATININE 0.87   CALCIUM 9.3       Coags    No recent results     Additional Electrolytes  No recent results       Blood Gas    No recent results  No recent results LFTs  Recent Labs     05/12/25  1338   ALT 10   AST 13   ALKPHOS 49   ALB 4.1   TBILI 0.36       Infectious  No recent results  Glucose  Recent Labs     05/12/25  1338   GLUC 87           None

## 2025-05-13 NOTE — PLAN OF CARE
Problem: PHYSICAL THERAPY ADULT  Goal: Performs mobility at highest level of function for planned discharge setting.  See evaluation for individualized goals.  Description: Treatment/Interventions: LE strengthening/ROM, Therapeutic exercise, Endurance training, Patient/family training, Equipment eval/education, Bed mobility, Compensatory technique education (PT to see when appropriate for OOB mobility)          See flowsheet documentation for full assessment, interventions and recommendations.  5/13/2025 1054 by Brittany Fairchild, PT  Note: Prognosis: Good  Problem List: Decreased endurance, Impaired balance, Decreased mobility  Assessment: Leeroy Santos is a 79 y.o. Male who presents to [facility] on 5/12/25 due to syncope and diagnosis of SAH. Orders for PT eval and treat received, w/ activity orders of OOB to chair (ordered by trauma). Comorbidities affecting pt's functional mobility at time of evaluation include: HTN, bradycardia. Personal factors affecting DC include: lives in 2 story house, stairs to enter home, and positive fall history. At baseline, pt mobilizes independently w/ no AD, and w/ 2 fall(s) in the previous 6 months. Upon evaluation, pt presents w/ the following deficits: impaired strength, impaired balance, and decreased endurance/activity tolerance. Pt currently requires  mod I for bed mobility. Pt's clinical presentation is unstable/unpredictable due to abnormal lab values, need for input for mobility technique, recent h/o falls, ongoing medical management. From a PT/mobility standpoint given the above findings, DC recommendation is level: III (Minimum Rehab Resource Intensity) pending further assessment of functional mobility. During current admission, pt will benefit from continued skilled inpatient PT in the acute care setting in order to address the above deficits and to maximize function and mobility prior to DC from acute care.  Barriers to Discharge: Inaccessible home environment     Rehab  Resource Intensity Level, PT: (S) III (Minimum Resource Intensity) (pending assessment of OOB mobility)    See flowsheet documentation for full assessment.

## 2025-05-13 NOTE — PHYSICAL THERAPY NOTE
PHYSICAL THERAPY EVALUATION NOTE          Patient Name: Leeroy Santos  Today's Date: 2025          AGE:   79 y.o.  Mrn:   [id number]  ADMIT DX:  Subarachnoid hemorrhage (HCC) [I60.9]  Syncope [R55]  Elevated blood pressure reading [R03.0]  Dizzy [R42]  Abrasion [T14.8XXA]  AMS (altered mental status) [R41.82]    Past Medical History:  Past Medical History:   Diagnosis Date    Grimaldo esophagus     EGD with RFA    Colon polyp     Decreased bone density     Disease of thyroid gland     hyperthyroid-goiter-thyroidectomy    Erectile dysfunction     Full dentures     GERD (gastroesophageal reflux disease)     Hyperlipidemia     Hypertension     Kyphosis     Osteopenia     Osteoporosis     Scoliosis     Seasonal allergies     Spondylosis     Wears glasses        Past Surgical History:  Past Surgical History:   Procedure Laterality Date    COLONOSCOPY      EGD      RFA x2  and , 3/2022 (RFA not done)    PARATHYROID GLAND SURGERY      2 removed 2 remain    THYROIDECTOMY      total    TONSILLECTOMY      as child     Length Of Stay: 0        PHYSICAL THERAPY EVALUATION:    Patient's identity confirmed via 2 patient identifiers (full name and ) at start of session       25 0940   PT Last Visit   PT Visit Date 25   Note Type   Note type Evaluation   Pain Assessment   Pain Assessment Tool 0-10   Pain Score No Pain   Restrictions/Precautions   Weight Bearing Precautions Per Order No   Other Precautions Chair Alarm;Bed Alarm;Multiple lines;Telemetry;Fall Risk   Home Living   Type of Home House   Home Layout Two level;1/2 bath on main level;Bed/bath upstairs  (1 [address] front from street, 6-7 steps + full flight of stairs to access house from where pt eid in the back of the house)   Home Equipment Crutches  (may have crutches)   Prior Function   Level of [place] Independent with functional mobility;Independent with ADLs   Lives With Spouse;Son  (son  with special needs)   Receives Help From Family   IADLs Independent with driving;Independent with meal prep;Independent with medication management   Falls in the last 6 months 1 to 4  (2)   Comments At baseline pt is fully independent w/ all functional mobility including stair negotiation (w/ increased time/effort), endorses ~6 months of BLE weakness making it difficult to negotiate the back JAXSON   General   Additional Pertinent History Pt admitted s/p fall, found to have SAH. CT head 25: stable trace SAH along the sulci of the R parietal lobe   Family/Caregiver Present No   Cognition   Overall Cognitive Status WFL   Arousal/Participation Cooperative   Attention Within functional limits   Orientation Level Oriented to person;Oriented to place;Oriented to situation   Memory Within functional limits   Following Commands Follows one step commands without difficulty   Comments Pt ID via name and  on wristband; pt agreeable to PT eval and mobility   Strength RLE   R Knee Flexion 3/5   R Knee Extension 3/5   R Ankle Dorsiflexion 5/5   R Ankle Plantar Flexion 5/5   LLE Assessment   LLE Assessment    Strength LLE   L Knee Flexion 3/5   L Knee Extension 3/5   L Ankle Dorsiflexion 5/5   L Ankle Plantar Flexion 5/5   Vision-Basic Assessment   Current Vision Wears glasses all the time   Light Touch   RLE Light Touch Grossly intact   LLE Light Touch Grossly intact   Bed Mobility   Supine to Sit 6  Modified independent   Additional items HOB elevated;Bedrails   Sit to Supine 6  Modified independent   Additional items HOB elevated;Bedrails;Increased time required   Additional Comments able to maintain sitting balance at EOB for ~2-3 min w/ supervision   Transfers   Sit to Stand Unable to assess   Stand to Sit Unable to assess   Additional Comments Per Cardiology (arrived to room during eval) pt not medically appropriate to mobilize OOB at this time, ok to sit up at EOB. OOB mobility deferred   Balance   Static Sitting Good    Dynamic Sitting Fair +   Activity Tolerance   Activity Tolerance Treatment limited secondary to medical complications (Comment)  (Cardiology arrived to room, reports pt not appropriate to mobilize OOB)   Medical Staff Made Aware Pt benefited from PT/OT care coordination w/ OT Bety due to medical status, PT and OT goals were addressed individually during session   Nurse Made Aware BANDAR Vieyra pre/post   Assessment   Prognosis Good   Problem List Decreased endurance;Impaired balance;Decreased mobility   Assessment Leeroy Santos is a 79 y.o. Male who presents [facility] on [facility]25 due to syncope and diagnosis of SAH. Orders for PT eval and treat received, w/ activity orders of OOB to chair (ordered by trauma). Comorbidities affecting pt's functional mobility at time of evaluation include: HTN, bradycardia. Personal factors affecting DC include: lives in 2 story house, stairs to enter home, and positive fall history. At baseline, pt mobilizes independently w/ no AD, and w/ 2 fall(s) in the previous 6 months. Upon evaluation, pt presents w/ the following deficits: impaired strength, impaired balance, and decreased endurance/activity tolerance. Pt currently requires  mod I for bed mobility. Pt's clinical presentation is unstable/unpredictable due to abnormal lab values, need for input for mobility technique, recent h/o falls, ongoing medical management. From a PT/mobility standpoint given the above findings, DC recommendation is level: III (Minimum Rehab Resource Intensity) pending further assessment of functional mobility. During current admission, pt will benefit from continued skilled inpatient PT in the acute care setting in order to address the above deficits and to maximize function and mobility prior to DC from acute care.   Barriers to Discharge Inaccessible home environment   Goals   Patient Goals to be independent   STG Expiration Date 05/23/25   Short Term Goal #1 Pt will: perform bilateral rolling bed  mobility w/ mod I to decrease pt's burden of care; perform supine<>sit mobility w/ mod I to increase pt's independence w/ functional mobility; sit at EOB independently for at least 15 minutes to increase pt's tolerance to an upright sitting position and prepare for OOB transfers; PT to see for OOB mobility when appropriate   Plan   Treatment/Interventions LE strengthening/ROM;Therapeutic exercise;Endurance training;Patient/family training;Equipment eval/education;Bed mobility;Compensatory technique education  (PT to see when appropriate for OOB mobility)   PT Frequency 3-5x/wk   Discharge Recommendation   Rehab Resource Intensity Level, PT (S)  III (Minimum Resource Intensity)  (pending assessment of OOB mobility)   AM-PAC Basic Mobility Inpatient   Turning in Flat Bed Without Bedrails 4   Lying on Back to Sitting on Edge of Flat Bed Without Bedrails 4   Moving Bed to Chair 3   Standing Up From Chair Using Arms 3   Walk in Room 3   Climb 3-5 Stairs With Railing 2   Basic Mobility Inpatient Raw Score 19   Basic Mobility Standardized Score 42.48   [place] Highest Level Of Mobility   -HLM Goal 6: Walk 10 steps or more   -HLM Achieved 3: Sit at edge of bed   End of Consult   Patient Position at End of Consult Supine;Bed/Chair alarm activated;All needs within reach       The patient's AM-PAC Basic Mobility Inpatient Short Form Raw Score is 19. A Raw score of greater than 16 suggests the patient may benefit from discharge to home. Please also refer to the recommendation of the Physical Therapist for safe discharge planning.    Pt will benefit from skilled inpatient PT during this admission in order to facilitate progress towards goals and to maximize functional independence prior to DC      DC rec: level III (Minimum Rehab Resource Intensity), pending assessment of OOB mobility        Brittany Fairchild, PT, DPT  05/13/25

## 2025-05-13 NOTE — UTILIZATION REVIEW
Initial Clinical Review    Admission: Date/Time/Statement: 5/12/25 1657 observation and CHANGED 5/13/25 1414 TO INPATIENT RE: PATIENT NEEDS > 2 MIDNIGHT STAY WITH BRADYCARDIA ON EPI DRIP AND CARDIOLOGY IS CONSULTED.   Admission Orders (From admission, onward)       Ordered        05/13/25 1414  INPATIENT ADMISSION  Once            05/12/25 1657  Place in Observation  Once                          Orders Placed This Encounter   Procedures    INPATIENT ADMISSION     Standing Status:   Standing     Number of Occurrences:   1     Level of Care:   Critical Care [15]     Estimated length of stay:   More than 2 Midnights     Certification:   I certify that inpatient services are medically necessary for this patient for a duration of greater than two midnights. See H&P and MD Progress Notes for additional information about the patient's course of treatment.     ED Arrival Information       Expected   -    Arrival   5/12/2025 12:04    Acuity   Urgent              Means of arrival   Walk-In    Escorted by   Spouse    Service   Critical Care/ICU    Admission type   Emergency              Arrival complaint   Saturday AM, syncope, dizzy, AMS             Chief Complaint   Patient presents with    Syncope     Syncope on Saturday. Fell backwards and per wife he did have some mild shaking for a couple seconds. Pt his his back but denies hitting his head. No thinners. No asa.Pt denies any symptoms or syncope since Saturday        Initial Presentation: 79 y.o. male  to ED via walk in from home.    Admitted to observation and CHANGED TO INPATIENT with Dx: Subarachnoid Hemorrhage/Syncope/abrasion/elevated BP.    Presented to ED with syncope that occurred 3 days prior to arrival,  while standing had sudden lightheadedness then syncope then few seconds of twitching.  Fell on bookshelf.  Did not want to seek care until got auth [facility], he said he did today.  . PMHx:  hypertension, hyperlipidemia, tobacco use . On exam: hypertension.   "Tender right thigh, superficial abrasion.  Alert and oriented.     Imaging shows possible subarachnoid hemorrhage.   ED treatment:  IVF  bolus, tetanus.    Plan includes:  admit to Level 2 stepdown/HOT.  neuro checks .  Repeat ct brain in am.  Monitor BP.  Consult neuro surgery.     Date: 5/13/25   Day 2 CHANGED TO INPATIENT:  admits to 2 falls at home,  gets funny, fuzzy feeling then syncope, one with LOC.  Overnight profound bradycardia to 30s with frequent episodes that appear to be ventricular standstill.  Started on Epi gtt with improvement to 50s.   To ICU.  Continue epi drip.  Telemetry.  Hourly neuro checks.  Keppra.     Per critical care 5/13/25:  Subarachnoid hemorrhage/Hypertension/hyperlipidemia/Sinus Pause/Bradycardia/Fall, syncopal episode.   Plan:  Keppra,  hourly neuro checks.  Repeat ct in am.  Syncopal work up. Echo.  Seizure precautions.  Telemetry. Hold DVT phar. Ppx until cleared by neurosurgery.   Continue epi gtt, pacer pads.   Trend electrolytes and replete as needed.  Continue home Losartan, statin.  No beta blocker.      5/13/25 per Cardiology - \"Syncope with resultant traumatic fall/high grade AV block.  Telemetry reviewed, currently in normal sinus rhythm with heart rates in the 70s to 80s. Having intermittent episodes of CHB with ventricular standstill up to 8 seconds.  Currently on IV Epi drip. Plan for PPM implantation on 5/14 at [facility].  N.p.o. after midnight.  In the interim recommend placement of TVP and wean off IV epi. \"  Continue losartan.  Telemetry.     Procedure 5/13/25  transvenous pacing wire insertion.     5/13/25 per neuro surgery - SAH and plan is monitor symptoms.  Repeat Ct brain.   Cleared for DVT ppx.      Patient has crossed 3 midnights and requires ongoing care    5/14/2025 .  Patient presents with  no complaints  On exam:  V paced at 70.    Abnormal labs or imaging:  wbc 9.60.    Diagnosis/Plan    recurrent syncope with resultant traumatic fall/High grade AVB " with ventricular standstill/SAH/Hypertension.   S/p TVP placement on 5/13 via right IJ.  Currently V-paced rate 70 bpm.  Plan for permanent pacer today (@ 11 am).  NPO.  Continue Losartan.  Telemetry.   Neuro checks every 4 hours.     ED Treatment-Medication Administration from 05/12/2025 1204 to 05/12/2025 1918         Date/Time Order Dose Route Action     05/12/2025 1401 sodium chloride 0.9 % bolus 250 mL 250 mL Intravenous New Bag     05/12/2025 1402 tetanus-diphtheria-acellular pertussis (BOOSTRIX) IM injection 0.5 mL 0.5 mL Intramuscular Given     05/12/2025 1652 acetaminophen (TYLENOL) tablet 975 mg 975 mg Oral Given            Scheduled Medications:  acetaminophen, 975 mg, Oral, Q8H MARLA  atorvastatin, 20 mg, Oral, HS  famotidine, 20 mg, Oral, Daily  levETIRAcetam, 500 mg, Oral, Q12H MARLA  levothyroxine, 175 mcg, Oral, Early Morning  losartan, 12.5 mg, Oral, QAM    pantoprazole (PROTONIX) EC tablet 40 mg  Dose: 40 mg  Freq: Daily (early morning) Route: PO  Start: 05/13/25 0600 End: 05/13/25 0620    ceFAZolin (ANCEF) IVPB (premix in dextrose) 2,000 mg 50 mL  Dose: 2,000 mg  Freq: Once Route: IV  Indications of Use: PROPHYLAXIS  Start: 05/14/25 1100     Continuous IV Infusions:  epinephrine, 1-10 mcg/min, Intravenous, Titrated - dc 0421 5/14    PRN Meds:  acetaminophen, 650 mg, Oral, Q6H PRN - x 1 5/13    ED Triage Vitals   Temperature Pulse Respirations Blood Pressure SpO2 Pain Score   05/12/25 1211 05/12/25 1211 05/12/25 1211 05/12/25 1211 05/12/25 1211 05/12/25 1652   (!) 97.4 °F (36.3 °C) 58 18 (!) 199/86 98 % 2     Weight (last 2 days)       Date/Time Weight    05/14/25 0322 70.8 (156.09)    05/13/25 0710 76.3 (168.21)    05/13/25 0234 76.3 (168.21)    05/12/25 1918 75.5 (166.45)          Date and Time Eye Opening Best Verbal Response Best Motor Response [place] Coma Scale Score   05/14/25 0800 4 5 6 15   05/14/25 0000 4 5 6 15   05/13/25 2000 4 5 6 15   05/13/25 1200 4 5 6 15   05/13/25 0800 4 5 6 15    05/13/25 0700 4 5 6 15   05/13/25 0600 4 5 6 15   05/13/25 0500 4 5 6 15   05/13/25 0400 4 5 6 15   05/13/25 0300 4 5 6 15   05/13/25 0200 4 5 6 15   05/13/25 0100 4 5 6 15   05/13/25 0000 4 5 6 15   05/12/25 2300 4 5 6 15   05/12/25 2200 4 5 6 15   05/12/25 2100 4 5 6 15   05/12/25 2000 4 5 6 15   05/12/25 1900 4 5 6 15   05/12/25 1800 4 5 6 15   05/12/25 1600 4 5 6 15     Vital Signs (last 3 days)       Date/Time Temp Pulse Resp BP MAP (mmHg) SpO2 O2 Device Patient Position - Orthostatic VS Jasmin Coma Scale Score Pain    05/14/25 11:56:51 -- -- -- -- -- -- -- -- -- No Pain    05/14/25 1100 -- 62 21 137/71 97 93 % -- Lying -- --    05/14/25 0830 -- 69 -- 119/70 89 97 % -- -- -- --    05/14/25 0800 -- -- -- -- -- -- -- -- 15 No Pain    05/14/25 0700 98 °F (36.7 °C) 69 22 107/72 85 98 % -- Lying -- --    05/14/25 0600 -- 69 22 132/62 89 98 % -- -- -- --    05/14/25 0500 -- 69 16 116/59 82 98 % -- -- -- --    05/14/25 0322 98.2 °F (36.8 °C) -- -- -- -- -- -- -- -- --    05/14/25 0300 -- 69 17 124/91 101 94 % -- -- -- --    05/14/25 0200 -- 69 16 149/69 99 96 % -- -- -- --    05/14/25 0100 -- 69 18 110/64 80 96 % -- -- -- --    05/14/25 0000 -- 69 18 122/71 93 96 % -- -- 15 --    05/13/25 2335 -- 69 21 125/58 84 98 % -- -- -- --    05/13/25 2319 97.9 °F (36.6 °C) -- -- -- -- -- -- -- -- --    05/13/25 2300 -- 69 23 103/65 77 97 % -- -- -- --    05/13/25 2200 -- 69 24 120/69 89 -- -- -- -- --    05/13/25 2100 -- 59 18 165/79 113 -- -- -- -- No Pain    05/13/25 2000 -- 71 31 137/69 93 -- -- -- 15 --    05/13/25 1900 98 °F (36.7 °C) 72 31 138/79 103 98 % None (Room air) -- -- --    05/13/25 1800 -- 69 -- 149/94 107 98 % -- -- -- --    05/13/25 1700 -- 47 15 150/66 95 98 % -- -- -- --    05/13/25 1636 -- -- -- -- -- -- -- -- -- Med Not Given for Pain - for MAR use only    05/13/25 1600 -- 59 24 131/63 90 98 % -- -- -- --    05/13/25 1500 98 °F (36.7 °C) 64 20 126/60 86 97 % -- -- -- --    05/13/25 1427 -- -- -- -- --  -- -- -- -- 5    05/13/25 1400 -- 76 16 114/85 96 94 % -- -- -- --    05/13/25 1305 -- 98 -- -- -- -- -- -- -- --    05/13/25 1300 -- 71 16 135/71 95 100 % -- -- -- --    05/13/25 1200 -- 61 22 134/63 90 100 % -- -- 15 No Pain    05/13/25 1100 -- 66 -- 121/59 83 99 % -- -- -- --    05/13/25 1000 -- 74 -- 155/63 91 99 % -- -- -- --    05/13/25 0955 -- -- -- -- -- -- -- -- -- No Pain    05/13/25 0940 -- -- -- -- -- -- -- -- -- No Pain    05/13/25 0921 -- 74 -- -- -- -- -- -- -- --    05/13/25 0900 -- 70 -- 163/74 106 93 % -- -- -- --    05/13/25 0845 -- 70 -- -- -- -- -- -- -- --    05/13/25 0832 -- -- -- -- -- -- -- -- -- Med Not Given for Pain - for MAR use only    05/13/25 0831 -- 66 -- 165/70 100 -- -- -- -- --    05/13/25 0800 -- 77 -- 160/69 99 99 % -- -- 15 No Pain    05/13/25 0710 -- 80 54 150/67 -- 98 % -- -- -- --    05/13/25 0700 -- -- -- -- -- -- -- -- 15 --    05/13/25 0600 -- 67 18 134/62 89 97 % -- -- 15 --    05/13/25 0500 -- 63 16 124/56 81 95 % -- -- 15 --    05/13/25 0400 -- 59 16 126/60 87 96 % -- -- 15 No Pain    05/13/25 0300 -- 61 19 124/60 86 98 % -- -- 15 --    05/13/25 0234 99.1 °F (37.3 °C) 74 19 130/56 81 98 % -- -- -- --    05/13/25 0200 -- 63 16 134/62 89 96 % -- -- 15 --    05/13/25 0139 -- 83 24 152/65 93 99 % -- -- -- --    05/13/25 0124 -- -- -- -- -- -- -- -- -- Med Not Given for Pain - for MAR use only    05/13/25 0100 -- -- -- -- -- -- -- -- 15 --    05/13/25 0044 -- 47 -- -- -- -- -- -- -- --    05/13/25 0037 -- 48 -- -- -- -- -- -- -- --    05/13/25 0030 -- 53 30 -- -- 98 % -- -- -- --    05/13/25 0000 -- 42 20 146/65 94 97 % -- -- 15 No Pain    05/12/25 2330 -- 43 21 154/70 100 97 % -- -- -- --    05/12/25 2300 -- 54 28 143/110 123 98 % -- -- 15 --    05/12/25 2230 -- 45 19 151/70 100 97 % -- -- -- --    05/12/25 2200 -- 52 29 150/70 100 98 % -- -- 15 --    05/12/25 2130 -- 54 28 150/66 95 99 % -- -- -- --    05/12/25 2100 -- 54 25 136/68 94 97 % -- -- 15 --    05/12/25 2000 --  -- -- -- -- -- -- -- 15 --    05/12/25 1930 -- -- -- -- -- -- -- -- -- No Pain    05/12/25 1918 98 °F (36.7 °C) 64 34 157/91 -- -- -- -- -- --    05/12/25 1900 -- 90 16 129/71 94 96 % None (Room air) -- 15 --    05/12/25 1800 -- -- -- -- -- -- -- -- 15 --    05/12/25 1700 -- 50 18 146/67 97 99 % None (Room air) -- -- --    05/12/25 1652 -- -- -- -- -- -- -- -- -- 2    05/12/25 1600 -- 53 18 166/79 114 100 % None (Room air) -- 15 --    05/12/25 1415 -- -- 18 176/75 108 -- None (Room air) Lying -- --    05/12/25 1330 -- 50 18 153/66 95 99 % None (Room air) Lying -- --    05/12/25 1300 -- 48 18 149/71 102 100 % None (Room air) Sitting -- --    05/12/25 1245 -- 50 18 191/83 119 100 % None (Room air) Sitting -- --    05/12/25 1211 97.4 °F (36.3 °C) 58 18 199/86 123 98 % None (Room air) Sitting -- --          Pertinent Labs/Diagnostic Test Results:   Radiology:  XR chest portable   Final Interpretation by Adriano Gottlieb MD (05/13 2963)      Right IJ line distal tip curved upon itself at the tricuspid valve level.      Consider repositioning.      The study was marked in EPIC for immediate notification.            Workstation performed: [id number]         CT head wo contrast   Final Interpretation by Karlos Magdaleno MD (05/13 7323)      Stable trace subarachnoid hemorrhage along the sulci of the right parietal lobe.      Mild chronic white matter microangiopathic changes.                  Workstation performed: [id number]         CT head without contrast   Final Interpretation by Natividad Duenas MD (05/12 7663)   Serpiginous asymmetric hyperattenuation in the posterior right parietal lobe may reflect a small focus of subarachnoid hemorrhage. No intraparenchymal hemorrhage or significant mass effect.      Moderate white matter changes.         I personally discussed this study via [email or web address] secure chat with ADRIANNA NAVA on 5/12/2025 3:23 PM.                  Resident: Guy Connelly I, the attending radiologist, have  reviewed the images and agree with the final report above.      Workstation performed: [id number]         XR chest 2 views   ED Interpretation by Blas Martinez MD (05/12 5412)   Per my independent interpretation: No acute cardiopulmonary process      Final Interpretation by Shoshana Maldonado MD (05/13 3255)      No acute cardiopulmonary disease.            Workstation performed: [id number]         XR femur 2 views RIGHT   ED Interpretation by Blas Martinez MD (05/12 145)   Per my independent interpretation: No acute osseous abnormality      Final Interpretation by Branden Fernandez MD (05/13 7322)      No acute osseous abnormality.   Degenerative change as noted.         Computerized Assisted Algorithm (CAA) may have been used to analyze all applicable images.         Workstation performed: [id number]           Cardiology:  Echo complete w/ contrast if indicated   Final Result by Linda Roach MD (05/13 1020)        Left Ventricle: Left ventricular cavity size is normal. Wall thickness    is normal. The left ventricular ejection fraction is 60%. Systolic    function is normal. Wall motion is normal. Diastolic function is mildly    abnormal, consistent with grade I (abnormal) relaxation.     Left Atrium: The atrium is mildly dilated.     Aortic Valve: The leaflets are moderately calcified. There is mild    stenosis. The aortic valve mean gradient is 9 mmHg. The dimensionless    velocity index is 0.61. The aortic valve area is 2.33 cm2.         ECG 12 lead    by Interface, Ris Results In (05/13 0020)      ECG 12 lead    by Interface, Ris Results In (05/12 1239)        Mon May 12, 2025   1258 ECG per ED independent interpretation: Bradycardic rate, regular rhythm, no ectopy, leftward axis, no ST elevations or depressions. RBBB.  QRS prolonged.  QTc within normal limits.  No morphology consistent with Brugada, arrhythmoenic right ventricular dysplasia, WPW     GI:  No orders to display      Results from last 7 days   Lab Units 05/14/25  0504 05/13/25  0425 05/12/25  1338   WBC Thousand/uL 9.60 15.09* 7.20   HEMOGLOBIN g/dL 13.8 12.0 12.6   HEMATOCRIT % 41.6 36.0* 38.5   PLATELETS Thousands/uL 293 356 325   TOTAL NEUT ABS Thousands/µL  --  10.65* 4.60     Results from last 7 days   Lab Units 05/14/25  0504 05/13/25  0425 05/12/25  1338   SODIUM mmol/L 140 139 141   POTASSIUM mmol/L 4.2 4.0 4.4   CHLORIDE mmol/L 111* 108 109*   CO2 mmol/L 24 24 27   ANION GAP mmol/L 5 7 5   BUN mg/dL 19 22 25   CREATININE mg/dL 0.81 0.94 0.87   EGFR ml/min/1.73sq m 85 77 82   CALCIUM mg/dL 9.0 8.8 9.3   CALCIUM, IONIZED mmol/L 1.19  --   --    MAGNESIUM mg/dL 2.2 2.1  --    PHOSPHORUS mg/dL 3.1 3.6  --      Results from last 7 days   Lab Units 05/13/25  0425 05/12/25  1338   AST U/L 12* 13   ALT U/L 10 10   ALK PHOS U/L 46 49   TOTAL PROTEIN g/dL 5.9* 6.5   ALBUMIN g/dL 3.7 4.1   TOTAL BILIRUBIN mg/dL 0.31 0.36     Results from last 7 days   Lab Units 05/13/25  0835 05/13/25  0425 05/13/25  0037   POC GLUCOSE mg/dl 125 182* 77     Results from last 7 days   Lab Units 05/14/25  0504 05/13/25  0425 05/12/25  1338   GLUCOSE RANDOM mg/dL 95 184* 87     Results from last 7 days   Lab Units 05/12/25  1338   CK TOTAL U/L 131     Results from last 7 days   Lab Units 05/12/25  1522 05/12/25  1338   HS TNI 0HR ng/L  --  5   HS TNI 2HR ng/L 5  --    HSTNI D2 ng/L 0  --      Results from last 7 days   Lab Units 05/12/25  1724   TSH 3RD GENERATON uIU/mL 9.230*       Past Medical History:   Diagnosis Date    Grimaldo esophagus     EGD with RFA    Colon polyp     Decreased bone density     Disease of thyroid gland     hyperthyroid-goiter-thyroidectomy    Erectile dysfunction     Full dentures     GERD (gastroesophageal reflux disease)     Hyperlipidemia     Hypertension     Kyphosis     Osteopenia     Osteoporosis     Scoliosis     Seasonal allergies     Spondylosis     Wears glasses      Present on Admission:   Hyperlipidemia   HTN  (hypertension)      Admitting Diagnosis: Subarachnoid hemorrhage (HCC) [I60.9]  Syncope [R55]  Elevated blood pressure reading [R03.0]  Dizzy [R42]  Abrasion [T14.8XXA]  AMS (altered mental status) [R41.82]  Age/Sex: 79 y.o. male    Network Utilization Review Department  ATTENTION: Please call with any questions or concerns to [phone] and carefully listen to the prompts so that you are directed to the right person. All voicemails are confidential.   For Discharge needs, contact Care Management DC Support Team at [phone] opt. [phone]  Send all requests for admission clinical reviews, approved or denied determinations and any other requests to dedicated fax number below belonging to the campus where the patient is receiving treatment. List of dedicated fax numbers for the Facilities:  FACILITY NAME UR FAX NUMBER   ADMISSION DENIALS (Administrative/Medical Necessity) [phone]   DISCHARGE SUPPORT TEAM (NETWORK) [phone]   PARENT CHILD HEALTH (Maternity/NICU/Pediatrics) [phone]   [facility] [phone]   [facility] [phone]   [facility] [phone]   [facility] [phone]   [facility] [phone]   [facility] [phone]   [facility] [phone]   [facility] [phone]   [facility] [phone]   [facility] [phone]   [facility] [phone]   [facility] [phone]

## 2025-05-13 NOTE — OCCUPATIONAL THERAPY NOTE
"    Occupational Therapy Evaluation     Patient Name: Leeroy Santos  Today's Date: 5/13/2025  Problem List  Principal Problem:    SAH (subarachnoid hemorrhage) (HCC)  Active Problems:    HTN (hypertension)    Hyperlipidemia    Sinus pause    Bradycardia    Fall    Past Medical History  Past Medical History:   Diagnosis Date    Grimaldo esophagus     EGD with RFA    Colon polyp     Decreased bone density     Disease of thyroid gland     hyperthyroid-goiter-thyroidectomy    Erectile dysfunction     Full dentures     GERD (gastroesophageal reflux disease)     Hyperlipidemia     Hypertension     Kyphosis     Osteopenia     Osteoporosis     Scoliosis     Seasonal allergies     Spondylosis     Wears glasses      Past Surgical History  Past Surgical History:   Procedure Laterality Date    COLONOSCOPY      EGD      RFA x2 2020 and 2021, 3/2022 (RFA not done)    PARATHYROID GLAND SURGERY      2 removed 2 remain    THYROIDECTOMY      total    TONSILLECTOMY      as child        05/13/25 0955   OT Last Visit   OT Visit Date 05/13/25  (Tuesday)   Note Type   Note type Evaluation  (Eval [id number])   Additional Comments Identified pt by full name and birthdate. Prefers \"Cody\"   Pain Assessment   Pain Assessment Tool 0-10   Pain Score No Pain   Restrictions/Precautions   Weight Bearing Precautions Per Order No   Other Precautions Chair Alarm;Bed Alarm;Multiple lines;Telemetry;Fall Risk   Home Living   Type of Home House   Home Layout Stairs to enter with rails  (1 JAXSON to front door vs 6-7 followed by flight from back where he eid car)   Bathroom Accessibility Accessible   Home Equipment Crutches  (not using AD or DME)   Additional Comments Pt reports living w/ his wife and son w/ special needs in older home   Prior Function   Level of [place] Independent with ADLs;Independent with functional mobility;Independent with IADLS  (+ drive)   Lives With Spouse;Son  (son w/ special needs)   Receives Help From Family   IADLs " "Independent with driving;Independent with meal prep;Independent with medication management   Falls in the last 6 months 1 to 4  (2)   Vocational Retired   Comments Pt reports I w/ ADL/ IADL w/ out use of AD or DME. 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   General   Additional Pertinent History Pt admitted to [facility] as a trauma on 5/12/25. DIagnosed subarachnoid hemorrhage and cardiology consulted. Planned intervention. Per cardiology not appropriate to get OOB this AM   Family/Caregiver Present No   Additional General Comments Personal and environmental factors supporting performance includes independent at baseline, supportive family; barriers include difficulty managing stairs, fall history   Subjective   Subjective \"I did feel a little \"fuzzy\" about 30 minutes ago\"   ADL   Where Assessed Edge of bed  (per cardiology not appropriate to get OOB this AM)   Eating Assistance 7  Independent   Eating Deficit Setup   Grooming Assistance 6  Modified Independent   Grooming Deficit Setup  (seated at EOB vs supine HOB elevated.)   UB Bathing Assistance Unable to assess   LB Bathing Assistance Unable to assess   UB Dressing Assistance 6  Modified independent   UB Dressing Deficit Setup;Increased time to complete   LB Dressing Assistance Unable to assess  (anticipate S at this time based on fxal obs skills, clinical judgement)   Toileting Assistance  6  Modified independent   Toileting Deficit Setup;Use of bedpan/urinal setup  (supine HOB elevated. Pt reports he has been using the urinal. Will continue to assess as appropriate OOB)   Additional Comments on room air O2 sats >90%   Bed Mobility   Supine to Sit 6  " Modified independent   Additional items Assist x 1;Increased time required  (due to multiple lines to pt's L to simulate set- up at home)   Sit to Supine 6  Modified independent   Additional items Assist x 1;Increased time required  (due to multiple lines)   Additional Comments Pt supine HOB elevated upon arrival and post eval w/ needs met, call bell in reach and bed alarm activated   Transfers   Sit to Stand Unable to assess   Stand to Sit Unable to assess   Stand pivot Unable to assess   Additional Comments Per cardiology not appropriate to mobilize, OOB at this time due to planned intervention   Functional Mobility   Additional Comments Will continue to assess as appropriate   Balance   Static Sitting Good   Static Standing   (unable to assess)   Ambulatory   (unable to assess)   Activity Tolerance   Activity Tolerance Treatment limited secondary to medical complications (Comment);Other (Comment)  (per cardiology not appropriate to mobilize, OOB)   Medical Staff Made Aware care coordination w/ Brittany WEISS due to decreased activity tolerance, regression from baseline, medical complexity   Nurse Made Aware Spoke w/ Azalia PORTILLO   RUE Assessment   RUE Assessment   (observed during ADs)   RUE Strength   RUE Overall Strength Within Functional Limits - able to perform ADL tasks with strength   LUE Assessment   LUE Assessment   (observed during ADLs)   LUE Strength   LUE Overall Strength Within Functional Limits - able to perform ADL tasks with strength   Hand Function   Gross Motor Coordination Functional   Fine Motor Coordination Functional   Hand Function Comments Pt reports R hand dominance   Sensation   Light Touch No apparent deficits   Vision-Basic Assessment   Current Vision Wears glasses all the time   Cognition   Overall Cognitive Status WFL   Arousal/Participation Alert;Cooperative   Attention Within functional limits   Orientation Level Oriented X4   Memory Within functional limits   Following Commands Follows  all commands and directions without difficulty   Comments Alert, oriented and able to follow directions during ADLs   Assessment   Limitation Decreased endurance;Decreased self-care trans;Decreased high-level ADLs  (impaired activity tolerance)   Assessment Pt is a 80yo male admitted to ICU at [facility] on 5/12/25 as a trauma following a fall at home 2 days prior. Diagnosed w/ subarachnoid hemorrhage. Per neurosurgery, recommend outpt follow up as needed and no further imaging. Cardiology consulted.  No significant PMH impacting his occupational performance. Pt reports difficulty managing stairs at home past 6 months. Pt reports living w/ his wife and son w/ special needs in 2 [place]. Pt reports I w/ ADL/ IADL w/ out use of AD. Upon eval, pt alert and oriented. Able to follow directions and participate in conversation. Pt completed bed mobility, grooming and UBD w/ mod I for + time due multiple lines while seated. Deferred OOB or functional mobility at this time per cardiology. Pt is completing ADLs below baseline level of I due to decreased activity tolerance, standing tolerance and endurance. Recommend level III rehab resource intensity when medically stable for discharge from acute care pend progress, medical optimization. Will continue to follow   Goals   Patient Goals Pt stated that he wants to return home to baseline level of I   LTG Time Frame 10-14   Long Term Goal see below   Plan   Treatment Interventions ADL retraining;Functional transfer training;Endurance training;Patient/family training;Equipment evaluation/education;Compensatory technique education;Continued evaluation;Energy conservation;Activityengagement   Goal Expiration Date 05/23/25   OT Treatment Day 0  (Tuesday 5/13/25)   OT Frequency 2-3x/wk   Discharge Recommendation   Rehab Resource Intensity Level, OT III (Minimum Resource Intensity)  (pend progress, medical optimization)   AM-PAC Daily Activity Inpatient   Lower Body Dressing 3   Bathing 3    Toileting 3   Upper Body Dressing 4   Grooming 4   Eating 4   Daily Activity Raw Score 21   Daily Activity Standardized Score (Calc for Raw Score >=11) 44.27   AM-PAC Applied Cognition Inpatient   Following a Speech/Presentation 4   Understanding Ordinary Conversation 4   Taking Medications 4   Remembering Where Things Are Placed or Put Away 4   Remembering List of 4-5 Errands 4   Taking Care of Complicated Tasks 4   Applied Cognition Raw Score 24   Applied Cognition Standardized Score 62.21   Barthel Index   Feeding 10   Bathing 0   Grooming Score 5   Dressing Score 5   Bladder Score 10   Bowels Score 10   Toilet Use Score 0   Transfers (Bed/Chair) Score 0   Mobility (Level Surface) Score 0   Stairs Score 0   Barthel Index Score 40   End of Consult   Education Provided Yes   Patient Position at End of Consult Supine;Bed/Chair alarm activated;All needs within reach   Nurse Communication Nurse aware of consult   Licensure   [place] License Number  Bety Hauserguadalupe, OTR/L          The patient's raw score on the AM-PAC Daily Activity Inpatient Short Form is 21. A raw score of greater than or equal to 19 suggests the patient may benefit from discharge to home. Please refer to the recommendation of the Occupational Therapist for safe discharge planning.      Pt goals to be met by 5/23/25 to max I w/ ADLs and improve engagement to return home to OF, baseline level of I includes:    -Pt will demonstrated good attention and understanding EC tech to max I w/ ADLs, improve engagement to return home    -Pt will demonstrate improved sustained activity tolerance during ADLs vs preferred leisure tasks for at least 15 minutes w/ out rest break or sign /symptoms of fatigue to return home to baseline level of I, manage stairs at back of house    -Pt will demonstrated improved AMPAC score to at least 24 to max I w/ ADLs, assist in DC planning.     -Pt will complete bed mobility independently in preparation for ADLs    -Pt will  complete LBD independently    -Pt will demonstrate good attention and participation in ongoing eval of functional transfers, mobility using LRAD, DME as needed to assist in DC Planning.     -Pt will complete UBD independently.       Bety Gabriel OTR/NOEMI [id number]  [id number]

## 2025-05-13 NOTE — ASSESSMENT & PLAN NOTE
Syncopal fall at home. Feels his head get fuzzy and then falls. Once had brief LOC.   Delayed presentation  Injury: small SAH right parietal lobe.     Plan:   PT/OT when able  PMR consult   Fall Pecautions

## 2025-05-13 NOTE — ASSESSMENT & PLAN NOTE
S/p fall on Saturday  Suspect syncopal episode with brief LOC   [facility] for recommended to present to [facility]    Imaging:   CT head 9/13/2025: Stable trace subarachnoid hemorrhage along the sulcus of the right parietal lobe.    Plan:  Monitor symptoms  Repeat CT head stable  No reported thinners  Cleared for DVT prophylaxis  Given small size and no AC/AP, no additional imaging is required at this time.    Can follow-up as needed with neurosurgery.

## 2025-05-13 NOTE — PLAN OF CARE
Problem: PAIN - ADULT  Goal: Verbalizes/displays adequate comfort level or baseline comfort level  Description: Interventions:- Encourage patient to monitor pain and request assistance- Assess pain using appropriate pain scale- Administer analgesics based on type and severity of pain and evaluate response- Implement non-pharmacological measures as appropriate and evaluate response- Consider cultural and social influences on pain and pain management- Notify physician/advanced practitioner if interventions unsuccessful or patient reports new pain  Outcome: Progressing     Problem: INFECTION - ADULT  Goal: Absence or prevention of progression during hospitalization  Description: INTERVENTIONS:- Assess and monitor for signs and symptoms of infection- Monitor lab/diagnostic results- Monitor all insertion sites, i.e. indwelling lines, tubes, and drains- Monitor endotracheal if appropriate and nasal secretions for changes in amount and color- [place] appropriate cooling/warming therapies per order- Administer medications as ordered- Instruct and encourage patient and family to use good hand hygiene technique- Identify and instruct in appropriate isolation precautions for identified infection/condition  Outcome: Progressing  Goal: Absence of fever/infection during neutropenic period  Description: INTERVENTIONS:- Monitor WBC  Outcome: Progressing     Problem: SAFETY ADULT  Goal: Patient will remain free of falls  Description: INTERVENTIONS:- Educate patient/family on patient safety including physical limitations- Instruct patient to call for assistance with activity - Consult OT/PT to assist with strengthening/mobility - Keep Call bell within reach- Keep bed low and locked with side rails adjusted as appropriate- Keep care items and personal belongings within reach- Initiate and maintain comfort rounds- Make Fall Risk Sign visible to staff- Offer Toileting every 2 Hours, in advance of need- Initiate/Maintain alarm-  Obtain necessary fall risk management equipment: bed - Apply yellow socks and bracelet for high fall risk patients- Consider moving patient to room near nurses station  Outcome: Progressing  Goal: Maintain or return to baseline ADL function  Description: INTERVENTIONS:-  Assess patient's ability to carry out ADLs; assess patient's baseline for ADL function and identify physical deficits which impact ability to perform ADLs (bathing, care of mouth/teeth, toileting, grooming, dressing, etc.)- Assess/evaluate cause of self-care deficits - Assess range of motion- Assess patient's mobility; develop plan if impaired- Assess patient's need for assistive devices and provide as appropriate- Encourage maximum independence but intervene and supervise when necessary- Involve family in performance of ADLs- Assess for home care needs following discharge - Consider OT consult to assist with ADL evaluation and planning for discharge- Provide patient education as appropriate  Outcome: Progressing  Goal: Maintains/Returns to pre admission functional level  Description: INTERVENTIONS:- Perform AM-PAC 6 Click Basic Mobility/ Daily Activity assessment daily.- Set and communicate daily mobility goal to care team and patient/family/caregiver. - Collaborate with rehabilitation services on mobility goals if consulted- Perform Range of Motion 3 times a day.- Reposition patient every 2 hours.- Dangle patient 3 times a day- Stand patient 3 times a day- Ambulate patient 3 times a day- Out of bed to chair 3 times a day - Out of bed for meals 3 times a day- Out of bed for toileting- Record patient progress and toleration of activity level   Outcome: Progressing     Problem: DISCHARGE PLANNING  Goal: Discharge to home or other facility with appropriate resources  Description: INTERVENTIONS:- Identify barriers to discharge w/patient and caregiver- Arrange for needed discharge resources and transportation as appropriate- Identify discharge learning  needs (meds, wound care, etc.)- Arrange for interpretive services to assist at discharge as needed- Refer to Case Management Department for coordinating discharge planning if the patient needs post-hospital services based on physician/advanced practitioner order or complex needs related to functional status, cognitive ability, or social support system  Outcome: Progressing     Problem: Knowledge Deficit  Goal: Patient/family/caregiver demonstrates understanding of disease process, treatment plan, medications, and discharge instructions  Description: Complete learning assessment and assess knowledge base.Interventions:- Provide teaching at level of understanding- Provide teaching via preferred learning methods  Outcome: Progressing

## 2025-05-13 NOTE — CONSULTS
Cardiology   Leeroy Santos 79 y.o. male MRN: [id number]  Unit/Bed#: ICU 13 Encounter: [id number]      Reason for Consult / Principal Problem: Syncope.    Physician Requesting Consult:  Frederic Martinez DO    Outpatient Cardiologist: None    Assessment  1. Syncope w/ resultant traumatic fall.  2. High grade AVB w/ventricular standstill.   ECG on admission demonstrated sinus bradycardia, HR 41 bpm, bifascicular block.  Telemetry reviewed, currently in normal sinus rhythm with heart rates in the 70s to 80s.  Having intermittent episodes of CHB with ventricular standstill up to 8 seconds.   Not previously or currently on BB/AV juan ramon blocking agents.  Currently on IV epinephrine at 2 mcg/min        3. Preserved biventricular systolic function.  4. Mild aortic valve stenosis.  Appears stable/compensated.  TTE 5/13/2025; EF 60%, grade 1 DD, LA mildly dilated, mild AS.  5. SAH  Neurosurgery team following.  Nonsurgical management at this time.  Not previously currently on AP/AC  6. Hypertension  BP last recorded 165/70  Outpatient BP regimen's; losartan 12.5 mg daily  Inpatient BP regimen; losartan 12.5 mg daily.  7. Dyslipidemia  No recent lipid profile for review.  On atorvastatin 20 mg daily.    Plan  Pt denies any specific cardiac or respiratory complaints at this time.  No current supplemental O2 requirements with stable O2 saturations.  Euvolemic on exam.  Mentating appropriately.  BP modestly elevated, last recorded at 165/70.  Telemetry reviewed, currently maintaining NSR with heart rates in the 70s to 80s.  No further high-grade AVB noted.  Case discussed with our EP team as well as the Trauma surgery team..  Will plan for PPM implantation on 5/14 at [facility].  N.p.o. after midnight.  In the interim recommend placement of TVP and wean off IV epi.    Continue low-dose losartan for now, can uptitrate further if needed pending BP recordings off of IV epi.  Continue to monitor closely on telemetry.      HPI:  "Leeroy Santos 79 y.o. year old male with a medical history of hypertension and dyslipidemia.  Non-smoker, denies excessive alcohol or recreational/illicit drug use.   Active at his usual baseline.  Over the past couple of months has been experiencing intermittent ' fuzzy sensation' diffusely spreading across his entire head w/ associated lightheadedness and weakness Over this past weekend he had experienced similar symptoms but more severe with resultant LOC and subsequent falls.  He decided to come into the ED for further evaluation of his symptoms.  Upon further workup by the trauma surgery team he was found to have a small subarachnoid hemorrhage along the sulcus of the right parietal lobe.  He was evaluated by the neurosurgery service whom determined that he did not need any acute surgical intervention.  Overnight on telemetry monitoring he was found to have intermittent CHB with ventricular standstill up to 8 seconds.  He was initiated on an IV epinephrine infusion with improvement in HR/rhythm.  Cardiology has now been consulted for further treatment recommendations/management.        Family History:   Family History   Problem Relation Age of Onset    Colon cancer Father     Cancer Father         colon    Heart disease Father     Thyroid disease Mother         goiter    Other Sister         something like \"alzheimers\"    Cancer Paternal Aunt         colon    Thyroid disease Sister      Historical Information   Past Medical History:   Diagnosis Date    Grimaldo esophagus     EGD with RFA    Colon polyp     Decreased bone density     Disease of thyroid gland     hyperthyroid-goiter-thyroidectomy    Erectile dysfunction     Full dentures     GERD (gastroesophageal reflux disease)     Hyperlipidemia     Hypertension     Kyphosis     Osteopenia     Osteoporosis     Scoliosis     Seasonal allergies     Spondylosis     Wears glasses      Past Surgical History:   Procedure Laterality Date    COLONOSCOPY      EGD      " "RFA x2  and , 3/2022 (RFA not done)    PARATHYROID GLAND SURGERY      2 removed 2 remain    THYROIDECTOMY      total    TONSILLECTOMY      as child     Social History   Social History     Substance and Sexual Activity   Alcohol Use Yes    Alcohol/week: 7.0 standard drinks of alcohol    Types: 7 Glasses of wine per week    Comment: 1 glass with dinner     Social History     Substance and Sexual Activity   Drug Use Not Currently    Frequency: 4.0 times per week    Types: Marijuana    Comment: smokes joints prior to using e-cigarettes THC     Social History     Tobacco Use   Smoking Status Former    Current packs/day: 0.00    Types: Cigarettes    Quit date:     Years since quittin.3   Smokeless Tobacco Never     Family History:   Family History   Problem Relation Age of Onset    Colon cancer Father     Cancer Father         colon    Heart disease Father     Thyroid disease Mother         goiter    Other Sister         something like \"alzheimers\"    Cancer Paternal Aunt         colon    Thyroid disease Sister        Review of Systems:  Review of Systems   Constitutional:  Negative for chills, fatigue and fever.   Eyes:  Negative for visual disturbance.   Respiratory:  Negative for cough, chest tightness and shortness of breath.    Cardiovascular:  Negative for chest pain, palpitations and leg swelling.   Gastrointestinal:  Negative for abdominal pain.   Neurological:  Negative for dizziness, light-headedness and headaches.   All other systems reviewed and are negative.          Scheduled Meds:  Current Facility-Administered Medications   Medication Dose Route Frequency Provider Last Rate    acetaminophen  975 mg Oral Q8H MODESTO Mendez      atorvastatin  20 mg Oral HS MODESTO Bravo      [START ON 2025] ceFAZolin  2,000 mg Intravenous Once Maria Luz Cha PA-C      enoxaparin  30 mg Subcutaneous Q12H [facility] MODESTO Blanc      epinephrine  1-10 mcg/min Intravenous Titrated " "MODESTO Almanza 2 mcg/min (05/13/25 0940)    famotidine  20 mg Oral Daily MODESTO Bravo      levETIRAcetam  500 mg Oral Q12H [facility] Sunshine Moffett MD      levothyroxine  175 mcg Oral Early Morning MODESTO Bravo      losartan  12.5 mg Oral QAM MODESTO Bravo       Continuous Infusions:epinephrine, 1-10 mcg/min, Last Rate: 2 mcg/min (05/13/25 0940)      PRN Meds:.  all current active meds have been reviewed and current meds:   Current Facility-Administered Medications:     acetaminophen (TYLENOL) tablet 975 mg, Q8H MARLA    atorvastatin (LIPITOR) tablet 20 mg, HS    [START ON 5/14/2025] ceFAZolin (ANCEF) IVPB (premix in dextrose) 2,000 mg 50 mL, Once    enoxaparin (LOVENOX) subcutaneous injection 30 mg, Q12H MARLA    EPINEPHrine 5,000 mcg (STANDARD CONCENTRATION) IV in sodium chloride 0.9% 250 mL, Titrated, Last Rate: 2 mcg/min (05/13/25 0940)    famotidine (PEPCID) tablet 20 mg, Daily    levETIRAcetam (KEPPRA) tablet 500 mg, Q12H MARLA    levothyroxine tablet 175 mcg, Early Morning    losartan (COZAAR) tablet 12.5 mg, QAM    No Known Allergies    Objective   Vitals: Blood pressure 165/70, pulse 74, temperature 99.1 °F (37.3 °C), temperature source Oral, resp. rate (!) 54, height 5' 8\" (1.727 m), weight 76.3 kg (168 lb 3.4 oz), SpO2 98%., Body mass index is 25.58 kg/m².,   Orthostatic Blood Pressures      Flowsheet Row Most Recent Value   Blood Pressure 165/70 filed at 05/13/2025 0831   Patient Position - Orthostatic VS Lying filed at 05/12/2025 1415              Intake/Output Summary (Last 24 hours) at 5/13/2025 1125  Last data filed at 5/13/2025 0800  Gross per 24 hour   Intake 860.85 ml   Output 975 ml   Net -114.15 ml       Invasive Devices       Peripheral Intravenous Line  Duration             Peripheral IV 05/12/25 Dorsal (posterior);Left Forearm <1 day    Peripheral IV 05/13/25 Right;Ventral (anterior) Forearm <1 day                  Physical Exam:  Physical Exam  Vitals and nursing " note reviewed.   Constitutional:       General: He is not in acute distress.     Appearance: He is not diaphoretic.   HENT:      Head: Normocephalic and atraumatic.   Eyes:      General: No scleral icterus.  Cardiovascular:      Rate and Rhythm: Normal rate and regular rhythm.      Pulses: Normal pulses.      Heart sounds: Normal heart sounds.   Pulmonary:      Effort: Pulmonary effort is normal.      Breath sounds: Normal breath sounds. No wheezing or rales.   Abdominal:      Palpations: Abdomen is soft.   Musculoskeletal:      Right lower leg: No edema.      Left lower leg: No edema.   Skin:     General: Skin is warm and dry.      Capillary Refill: Capillary refill takes less than 2 seconds.   Neurological:      General: No focal deficit present.      Mental Status: He is alert and oriented to person, place, and time.   Psychiatric:         Mood and Affect: Mood normal.         Lab Results:   Recent Results (from the past 24 hours)   ECG 12 lead    Collection Time: 05/12/25 12:37 PM   Result Value Ref Range    Ventricular Rate 51 BPM    Atrial Rate 51 BPM    SD Interval 152 ms    QRSD Interval 144 ms    QT Interval 462 ms    QTC Interval 425 ms    P [place] 68 degrees    QRS Axis -73 degrees    T Wave Axis 42 degrees   CBC and differential    Collection Time: 05/12/25  1:38 PM   Result Value Ref Range    WBC 7.20 4.31 - 10.16 Thousand/uL    RBC 4.10 3.88 - 5.62 Million/uL    Hemoglobin 12.6 12.0 - 17.0 g/dL    Hematocrit 38.5 36.5 - 49.3 %    MCV 94 82 - 98 fL    MCH 30.7 26.8 - 34.3 pg    MCHC 32.7 31.4 - 37.4 g/dL    RDW 13.8 11.6 - 15.1 %    MPV 8.8 (L) 8.9 - 12.7 fL    Platelets 325 149 - 390 Thousands/uL    nRBC 0 /100 WBCs    Segmented % 63 43 - 75 %    Immature Grans % 0 0 - 2 %    Lymphocytes % 23 14 - 44 %    Monocytes % 12 4 - 12 %    Eosinophils Relative 1 0 - 6 %    Basophils Relative 1 0 - 1 %    Absolute Neutrophils 4.60 1.85 - 7.62 Thousands/µL    Absolute Immature Grans 0.02 0.00 - 0.20 Thousand/uL     "Absolute Lymphocytes 1.63 0.60 - 4.47 Thousands/µL    Absolute Monocytes 0.85 0.17 - 1.22 Thousand/µL    Eosinophils Absolute 0.05 0.00 - 0.61 Thousand/µL    Basophils Absolute 0.05 0.00 - 0.10 Thousands/µL   Comprehensive metabolic panel    Collection Time: 05/12/25  1:38 PM   Result Value Ref Range    Sodium 141 135 - 147 mmol/L    Potassium 4.4 3.5 - 5.3 mmol/L    Chloride 109 (H) 96 - 108 mmol/L    CO2 27 21 - 32 mmol/L    ANION GAP 5 4 - 13 mmol/L    BUN 25 5 - 25 mg/dL    Creatinine 0.87 0.60 - 1.30 mg/dL    Glucose 87 65 - 140 mg/dL    Calcium 9.3 8.4 - 10.2 mg/dL    AST 13 13 - 39 U/L    ALT 10 7 - 52 U/L    Alkaline Phosphatase 49 34 - 104 U/L    Total Protein 6.5 6.4 - 8.4 g/dL    Albumin 4.1 3.5 - 5.0 g/dL    Total Bilirubin 0.36 0.20 - 1.00 mg/dL    eGFR 82 ml/min/1.73sq m   HS Troponin 0hr (reflex protocol)    Collection Time: 05/12/25  1:38 PM   Result Value Ref Range    hs TnI 0hr 5 \"Refer to ACS Flowchart\"- see link ng/L   CK    Collection Time: 05/12/25  1:38 PM   Result Value Ref Range    Total  39 - 308 U/L   HS Troponin I 2hr    Collection Time: 05/12/25  3:22 PM   Result Value Ref Range    hs TnI 2hr 5 \"Refer to ACS Flowchart\"- see link ng/L    Delta 2hr hsTnI 0 <20 ng/L   Type and Screen    Collection Time: 05/12/25  5:24 PM   Result Value Ref Range    ABO Grouping B     Rh Factor Positive     Antibody Screen Negative     Specimen Expiration Date [id number]    TSH, 3rd generation with Free T4 reflex    Collection Time: 05/12/25  5:24 PM   Result Value Ref Range    TSH 3RD GENERATON 9.230 (H) 0.450 - 4.500 uIU/mL   T4, free    Collection Time: 05/12/25  5:24 PM   Result Value Ref Range    Free T4 0.97 0.61 - 1.12 ng/dL   ABORh Recheck - Contact Blood Bank Prior to Collection    Collection Time: 05/12/25  5:38 PM   Result Value Ref Range    ABO Grouping B     Rh Factor Positive    ECG 12 lead    Collection Time: 05/13/25 12:19 AM   Result Value Ref Range    Ventricular Rate 41 BPM    Atrial " Rate 41 BPM    MD Interval 150 ms    QRSD Interval 142 ms    QT Interval 482 ms    QTC Interval 397 ms    P [place] 79 degrees    QRS Axis -83 degrees    T Wave Axis 53 degrees   Fingerstick Glucose (POCT)    Collection Time: 05/13/25 12:37 AM   Result Value Ref Range    POC Glucose 77 65 - 140 mg/dl   CBC and differential    Collection Time: 05/13/25  4:25 AM   Result Value Ref Range    WBC 15.09 (H) 4.31 - 10.16 Thousand/uL    RBC 3.84 (L) 3.88 - 5.62 Million/uL    Hemoglobin 12.0 12.0 - 17.0 g/dL    Hematocrit 36.0 (L) 36.5 - 49.3 %    MCV 94 82 - 98 fL    MCH 31.3 26.8 - 34.3 pg    MCHC 33.3 31.4 - 37.4 g/dL    RDW 13.9 11.6 - 15.1 %    MPV 9.1 8.9 - 12.7 fL    Platelets 356 149 - 390 Thousands/uL    nRBC 0 /100 WBCs    Segmented % 69 43 - 75 %    Immature Grans % 1 0 - 2 %    Lymphocytes % 15 14 - 44 %    Monocytes % 13 (H) 4 - 12 %    Eosinophils Relative 1 0 - 6 %    Basophils Relative 1 0 - 1 %    Absolute Neutrophils 10.65 (H) 1.85 - 7.62 Thousands/µL    Absolute Immature Grans 0.07 0.00 - 0.20 Thousand/uL    Absolute Lymphocytes 2.29 0.60 - 4.47 Thousands/µL    Absolute Monocytes 1.93 (H) 0.17 - 1.22 Thousand/µL    Eosinophils Absolute 0.07 0.00 - 0.61 Thousand/µL    Basophils Absolute 0.08 0.00 - 0.10 Thousands/µL   Comprehensive metabolic panel    Collection Time: 05/13/25  4:25 AM   Result Value Ref Range    Sodium 139 135 - 147 mmol/L    Potassium 4.0 3.5 - 5.3 mmol/L    Chloride 108 96 - 108 mmol/L    CO2 24 21 - 32 mmol/L    ANION GAP 7 4 - 13 mmol/L    BUN 22 5 - 25 mg/dL    Creatinine 0.94 0.60 - 1.30 mg/dL    Glucose 184 (H) 65 - 140 mg/dL    Calcium 8.8 8.4 - 10.2 mg/dL    AST 12 (L) 13 - 39 U/L    ALT 10 7 - 52 U/L    Alkaline Phosphatase 46 34 - 104 U/L    Total Protein 5.9 (L) 6.4 - 8.4 g/dL    Albumin 3.7 3.5 - 5.0 g/dL    Total Bilirubin 0.31 0.20 - 1.00 mg/dL    eGFR 77 ml/min/1.73sq m   Magnesium    Collection Time: 05/13/25  4:25 AM   Result Value Ref Range    Magnesium 2.1 1.9 - 2.7 mg/dL    Phosphorus    Collection Time: 05/13/25  4:25 AM   Result Value Ref Range    Phosphorus 3.6 2.3 - 4.1 mg/dL   Fingerstick Glucose (POCT)    Collection Time: 05/13/25  4:25 AM   Result Value Ref Range    POC Glucose 182 (H) 65 - 140 mg/dl   Echo complete w/ contrast if indicated    Collection Time: 05/13/25  7:47 AM   Result Value Ref Range    Triscuspid Valve Regurgitation Peak Gradient 27.0 mmHg    RAA A4C 16.9 cm2    LA Volume Index (BP) 34.2 mL/m2    MV Peak A Nicolás 1.11 m/s    MV stenosis pressure 1/2 time 57 ms    MV Peak E Nicolás 89 cm/s    AV peak gradient 17 mmHg    LVOT stroke volume 110.87     Ao VTI 47.59 cm    Aortic valve peak velocity 2.04 m/s    LVOT peak VTI 29.18 cm    LVOT peak nicolás 1.52 m/s    LVOT diameter 2.2 cm    E wave deceleration time 195 ms    E/A ratio 0.80     MV valve area p 1/2 method 3.86     AV LVOT peak gradient 9 mmHg    AV mean gradient 9 mmHg    RA 2D Volume 42.0 mL    TR Peak Nicolás 2.6 m/s    AV area peak nicolás 2.8 cm2    AV area by cont VTI 2.3 cm2    LVOT mn grad 4.0 mmHg    RVID d 3.9 cm    A4C EF 57 %    Aortic valve mean velocity 13.90 m/s    Tricuspid valve peak regurgitation velocity 2.62 m/s    Left ventricular stroke volume (2D) 54.00 mL    IVSd 1.30 cm    Tricuspid annular plane systolic excursion 2.50 cm    Ao root 3.60 cm    LVPWd 1.10 cm    LA size 4.4 cm    LA volume (BP) 65 mL    FS 32 28 - 44    LVIDS 3.00 cm    IVS 1.3 cm    LVIDd 4.40 cm    LA length (A2C) 5.20 cm    LEFT VENTRICLE SYSTOLIC VOLUME (MOD BIPLANE) 2D 35 mL    LV DIASTOLIC VOLUME (MOD BIPLANE) 2D 89 mL    LVOT Cardiac Index 3.56 l/min/m2    LVOT stroke volume index 56.80 ml/m2    LVOT Cardiac Output 6.76 l/min    Left Atrium Area-systolic Four Chamber 22.2 cm2    Left Atrium Area-systolic Apical Two Chamber 19.4 cm2    MV E' Tissue Velocity Lateral 10 cm/s    MV E' Tissue Velocity Septal 8 cm/s    LVSV, 2D 54 mL    LVOT area 3.80 cm2    DVI 0.61     AV valve area 2.33 cm2    BSA 1.9 m2    LV EF 60     Est.  RA pres 5.0 mmHg    Right Ventricular Peak Systolic Pressure 32.00 mmHg    Asc Ao 3.4 cm   Fingerstick Glucose (POCT)    Collection Time: 05/13/25  8:35 AM   Result Value Ref Range    POC Glucose 125 65 - 140 mg/dl     * Please Note: Fluency DirectDictation voice to text software may have been used in the creation of this document. **

## 2025-05-13 NOTE — ASSESSMENT & PLAN NOTE
CT Head: Serpiginous asymmetric hyperattenuation in the posterior right parietal lobe may reflect a small focus of subarachnoid hemorrhage. No intraparenchymal hemorrhage or significant mass effect.   S/P syncopal falls at home.   Delayed presentation to hospital given unsure policy with [facility] insurance     Plan:  Keppra for Seizure ppx   Q 1 hour neuro checks   STAT CT scan for change in GCS > 2   Repeat scan in AM to eval for stability   Syncopal workup   ECHO pending  Seizure precautions   Monitor telemetry   Hold DVT till cleared by neurosurgery

## 2025-05-13 NOTE — CONSULTS
Consultation - Neurosurgery   Name: Leeroy Santos 79 y.o. male I MRN: [id number]  Unit/Bed#: ICU 13 I Date of Admission: 5/12/2025   Date of Service: 5/13/2025 I Hospital Day: 0   Inpatient consult to Neurosurgery  Consult performed by: Darrion Shell PA-C  Consult ordered by: MODESTO Bravo        Physician Requesting Evaluation: Frederic Martinez DO   Reason for Evaluation / Principal Problem: Subarachnoid hemorrhage    Assessment & Plan  SAH (subarachnoid hemorrhage) (HCC)  S/p fall on Saturday  Suspect syncopal episode with brief LOC   Contact [facility] provider yesterday for recommended to present to the ED    Imaging:   CT head 9/13/2025: Stable trace subarachnoid hemorrhage along the sulcus of the right parietal lobe.    Plan:  Monitor symptoms  Repeat CT head stable  No reported thinners  Cleared for DVT prophylaxis  Given small size and no AC/AP, no additional imaging is required at this time.    Can follow-up as needed with neurosurgery.  Please contact the SecureChat role for the Neurosurgery service with any questions/concerns.    History of Present Illness   HPI: Leeroy Santos is a 79 y.o. year old male who presented to the ER pression of his medical provider.  Patient suffered a fall while standing in the kitchen on Saturday.  States he began to have this sensation across his forehead into his eyes and things seem to fade out.  He collapsed to the floor with what he believed to be a brief period of LOC.  His wife was a witness to the fall.  No reports of seizure-like activity.  He had a few episodes of similar prodromal events without syncopal falls following the initial fall.  He contacted his [facility] provider who recommended him to be seen in the ER.  Currently denies any symptoms.    Review of Systems   Constitutional: Negative.    HENT: Negative.     Eyes: Negative.    Respiratory: Negative.     Cardiovascular: Negative.    Gastrointestinal: Negative.    Genitourinary: Negative.     Musculoskeletal: Negative.    Neurological:  Negative for dizziness, speech difficulty, weakness, light-headedness and headaches.   Psychiatric/Behavioral: Negative.       Medical History Review: I have reviewed the patient's PMH, PSH, Social History, Family History, Meds, and Allergies     Objective :  Temp:  [97.4 °F (36.3 °C)-99.1 °F (37.3 °C)] 99.1 °F (37.3 °C)  HR:  [42-90] 70  BP: (124-199)/() 165/70  Resp:  [16-54] 54  SpO2:  [95 %-100 %] 98 %  O2 Device: None (Room air)    Physical Exam  Constitutional:       Appearance: Normal appearance. He is well-developed.   HENT:      Head: Normocephalic and atraumatic.   Eyes:      Extraocular Movements: Extraocular movements intact.   Pulmonary:      Effort: Pulmonary effort is normal.   Musculoskeletal:         General: Normal range of motion.      Cervical back: Normal range of motion.   Skin:     General: Skin is warm and dry.   Neurological:      Mental Status: He is alert and oriented to person, place, and time.      Cranial Nerves: No cranial nerve deficit.      Sensory: No sensory deficit.      Motor: No weakness.   Psychiatric:         Mood and Affect: Mood normal.         Behavior: Behavior normal.         Thought Content: Thought content normal.       Lab Results: I have reviewed the following results:  Recent Labs     05/12/25  1338 05/12/25  1522 05/13/25  0425   WBC 7.20  --  15.09*   HGB 12.6  --  12.0   HCT 38.5  --  36.0*     --  356   SODIUM 141  --  139   K 4.4  --  4.0   *  --  108   CO2 27  --  24   BUN 25  --  22   CREATININE 0.87  --  0.94   GLUC 87  --  184*   MG  --   --  2.1   PHOS  --   --  3.6   AST 13  --  12*   ALT 10  --  10   ALB 4.1  --  3.7   TBILI 0.36  --  0.31   ALKPHOS 49  --  46   HSTNI0 5  --   --    HSTNI2  --  5  --        Imaging Results Review: I personally reviewed the following image studies in PACS and associated radiology reports: CT head. My interpretation of the radiology images/reports is: See  above.  Other Study Results Review: No additional pertinent studies reviewed.    VTE Pharmacologic Prophylaxis: Sequential compression device (Venodyne)

## 2025-05-13 NOTE — NURSING NOTE
Transvenous pacer:   -Rate: 70 bpm   -Ma: 18.0   -mV:2.0  1515: adjusted by Trauma Attending to 48cm

## 2025-05-13 NOTE — ASSESSMENT & PLAN NOTE
Into the 30s with what appears to be periods ventricular standstill   Placed on epi gtt with improvement in HR   BP stable     Plan:  Cardiology consult   ECHO pending   Consider EP  eval   Hold all beta blockers   Likely will require PPM   Maintain pacer pads

## 2025-05-13 NOTE — ASSESSMENT & PLAN NOTE
Frequent episodes of ventricular standstill   Started epi gtt with improvement     Plan:   Continue Epi gtt  Maintain pacer pads   Cardiology consult   Maintain electrolytes

## 2025-05-13 NOTE — PROCEDURES
Transvenous Pacing Wire Insertion    Date/Time: 5/13/2025 1:55 PM    Performed by: Salma Lr MD  Authorized by: Salma Lr MD    Patient location:  ICU  Other Assisting Provider: Yes (comment) (Dr Martinez, Kranthi Isaac)    Consent:     Consent obtained:  Written and verbal    Consent given by:  Patient    Risks discussed:  Arterial puncture, bleeding, pneumothorax, induced arrhythmia, incorrect placement, need for surgical removal, infection and nerve damage  Pre-procedure details:     Skin preparation:  ChloraPrep  Sedation:     Sedation type:  None  Anesthesia (see MAR for exact dosages):     Anesthesia method:  Local infiltration    Local anesthetic:  Lidocaine 1% w/o epi  Indications:     Temporary pacing indications:  Complete heart block  Procedure details:     Patient position:  Flat    Venous introducer: new sheath/introducer placed      Location:  Right internal jugular    Landmarks identified: yes      Ultrasound guidance: yes      Ultrasound image availability:  Images available in PACS    Sterile ultrasound techniques: Sterile gel and sterile probe covers were used      Successful placement: yes      Number of attempts:  1    Pacing catheter size:  5 Fr    TVP depth at introducer hub (cm):  40  Pacer settings:     Pacing catheter type:  Bipolar    Wire type: balloon tip      Ventricular site placed: right ventricle apex      Temporary pacer function: turned on      Pacer mode: demand      Rate:  60    mA:  6    Pacing capture:  100% capture  Post-procedure details:     TVP catheter securement: TVP catheter cover in place, TVP catheter hub locked and catheter hub secured      Post-procedure:  Line sutured, securement device, sterile caps on all hubs and sterile dressing applied    Image confirmation:  Chest x-ray pending    Patient tolerance of procedure:  Tolerated well, no immediate complications  POC Cardiac US    Date/Time: 5/13/2025 12:36 PM    Performed by: Salma Lr  MD  Authorized by: Salma Lr MD    Patient location:  ICU  Procedure details:     Exam Type:  Diagnostic    Indications comment:  TVP placement    Assessment / Evaluation for comment:  Pacer wire location    Exam Type: initial exam      Image quality: diagnostic      Image availability:  Images available in PACS  Patient Details:     Cardiac Rhythm:  Regular    Medications: epinephrine      Mechanical ventilation: No    Cardiac findings:     Echo technique: limited 2D      Views obtained: subcostal and apical    Interpretation:      TVP wire located, noted at the apex of the RA, no immediate complications.

## 2025-05-13 NOTE — PLAN OF CARE
Problem: PAIN - ADULT  Goal: Verbalizes/displays adequate comfort level or baseline comfort level  Description: Interventions:- Encourage patient to monitor pain and request assistance- Assess pain using appropriate pain scale- Administer analgesics based on type and severity of pain and evaluate response- Implement non-pharmacological measures as appropriate and evaluate response- Consider cultural and social influences on pain and pain management- Notify physician/advanced practitioner if interventions unsuccessful or patient reports new pain  Outcome: Progressing     Problem: INFECTION - ADULT  Goal: Absence or prevention of progression during hospitalization  Description: INTERVENTIONS:- Assess and monitor for signs and symptoms of infection- Monitor lab/diagnostic results- Monitor all insertion sites, i.e. indwelling lines, tubes, and drains- Monitor endotracheal if appropriate and nasal secretions for changes in amount and color- [place] appropriate cooling/warming therapies per order- Administer medications as ordered- Instruct and encourage patient and family to use good hand hygiene technique- Identify and instruct in appropriate isolation precautions for identified infection/condition  Outcome: Progressing  Goal: Absence of fever/infection during neutropenic period  Description: INTERVENTIONS:- Monitor WBC  Outcome: Progressing     Problem: SAFETY ADULT  Goal: Patient will remain free of falls  Description: INTERVENTIONS:- Educate patient/family on patient safety including physical limitations- Instruct patient to call for assistance with activity - Consult OT/PT to assist with strengthening/mobility - Keep Call bell within reach- Keep bed low and locked with side rails adjusted as appropriate- Keep care items and personal belongings within reach- Initiate and maintain comfort rounds- Make Fall Risk Sign visible to staff-   Goal: Maintain or return to baseline ADL function  Description: INTERVENTIONS:-   Assess patient's ability to carry out ADLs; assess patient's baseline for ADL function and identify physical deficits which impact ability to perform ADLs (bathing, care of mouth/teeth, toileting, grooming, dressing, etc.)- Assess/evaluate cause of self-care deficits - Assess range of motion- Assess patient's mobility; develop plan if impaired- Assess patient's need for assistive devices and provide as appropriate- Encourage maximum independence but intervene and supervise when necessary- Involve family in performance of ADLs- Assess for home care needs following discharge - Consider OT consult to assist with ADL evaluation and planning for discharge- Provide patient education as appropriate  Outcome: Progressing  Goal: Maintains/Returns to pre admission functional level  Description: INTERVENTIONS:- Perform AM-PAC 6 Click Basic Mobility/ Daily Activity assessment daily.- Set and communicate daily mobility goal to care team and patient/family/caregiver. - Collaborate with rehabilitation services on mobility goals if consulted     Problem: DISCHARGE PLANNING  Goal: Discharge to home or other facility with appropriate resources  Description: INTERVENTIONS:- Identify barriers to discharge w/patient and caregiver- Arrange for needed discharge resources and transportation as appropriate- Identify discharge learning needs (meds, wound care, etc.)- Arrange for interpretive services to assist at discharge as needed- Refer to Case Management Department for coordinating discharge planning if the patient needs post-hospital services based on physician/advanced practitioner order or complex needs related to functional status, cognitive ability, or social support system  Outcome: Progressing     Problem: Knowledge Deficit  Goal: Patient/family/caregiver demonstrates understanding of disease process, treatment plan, medications, and discharge instructions  Description: Complete learning assessment and assess knowledge  base.Interventions:- Provide teaching at level of understanding- Provide teaching via preferred learning methods  Outcome: Progressing

## 2025-05-13 NOTE — PLAN OF CARE
Problem: PAIN - ADULT  Goal: Verbalizes/displays adequate comfort level or baseline comfort level  Description: Interventions:- Encourage patient to monitor pain and request assistance- Assess pain using appropriate pain scale- Administer analgesics based on type and severity of pain and evaluate response- Implement non-pharmacological measures as appropriate and evaluate response- Consider cultural and social influences on pain and pain management- Notify physician/advanced practitioner if interventions unsuccessful or patient reports new pain  Outcome: Progressing     Problem: INFECTION - ADULT  Goal: Absence or prevention of progression during hospitalization  Description: INTERVENTIONS:- Assess and monitor for signs and symptoms of infection- Monitor lab/diagnostic results- Monitor all insertion sites, i.e. indwelling lines, tubes, and drains- Monitor endotracheal if appropriate and nasal secretions for changes in amount and color- [place] appropriate cooling/warming therapies per order- Administer medications as ordered- Instruct and encourage patient and family to use good hand hygiene technique- Identify and instruct in appropriate isolation precautions for identified infection/condition  Outcome: Progressing  Goal: Absence of fever/infection during neutropenic period  Description: INTERVENTIONS:- Monitor WBC  Outcome: Progressing     Problem: SAFETY ADULT  Goal: Patient will remain free of falls  Description: INTERVENTIONS:- Educate patient/family on patient safety including physical limitations- Instruct patient to call for assistance with activity - Consult OT/PT to assist with strengthening/mobility - Keep Call bell within reach- Keep bed low and locked with side rails adjusted as appropriate- Keep care items and personal belongings within reach- Initiate and maintain comfort rounds- Make Fall Risk Sign visible to staff- Offer Toileting every 2 Hours, in advance of need- Initiate/Maintain bed alarm-  Obtain necessary fall risk management equipment - Apply yellow socks and bracelet for high fall risk patients- Consider moving patient to room near nurses station  Outcome: Progressing  Goal: Maintain or return to baseline ADL function  Description: INTERVENTIONS:-  Assess patient's ability to carry out ADLs; assess patient's baseline for ADL function and identify physical deficits which impact ability to perform ADLs (bathing, care of mouth/teeth, toileting, grooming, dressing, etc.)- Assess/evaluate cause of self-care deficits - Assess range of motion- Assess patient's mobility; develop plan if impaired- Assess patient's need for assistive devices and provide as appropriate- Encourage maximum independence but intervene and supervise when necessary- Involve family in performance of ADLs- Assess for home care needs following discharge - Consider OT consult to assist with ADL evaluation and planning for discharge- Provide patient education as appropriate  Outcome: Progressing  Goal: Maintains/Returns to pre admission functional level  Description: INTERVENTIONS:- Perform AM-PAC 6 Click Basic Mobility/ Daily Activity assessment daily.- Set and communicate daily mobility goal to care team and patient/family/caregiver. - Collaborate with rehabilitation services on mobility goals if consulted- Perform Range of Motion 3 times a day.- Reposition patient every 2 hours.- Dangle patient 3 times a day- Stand patient 3 times a day- Ambulate patient 3 times a day- Out of bed to chair 3 times a day - Out of bed for meals 3 times a day- Out of bed for toileting- Record patient progress and toleration of activity level   Outcome: Progressing     Problem: DISCHARGE PLANNING  Goal: Discharge to home or other facility with appropriate resources  Description: INTERVENTIONS:- Identify barriers to discharge w/patient and caregiver- Arrange for needed discharge resources and transportation as appropriate- Identify discharge learning needs  (meds, wound care, etc.)- Arrange for interpretive services to assist at discharge as needed- Refer to Case Management Department for coordinating discharge planning if the patient needs post-hospital services based on physician/advanced practitioner order or complex needs related to functional status, cognitive ability, or social support system  Outcome: Progressing     Problem: Knowledge Deficit  Goal: Patient/family/caregiver demonstrates understanding of disease process, treatment plan, medications, and discharge instructions  Description: Complete learning assessment and assess knowledge base.Interventions:- Provide teaching at level of understanding- Provide teaching via preferred learning methods  Outcome: Progressing

## 2025-05-13 NOTE — PLAN OF CARE
Problem: OCCUPATIONAL THERAPY ADULT  Goal: Performs self-care activities at highest level of function for planned discharge setting.  See evaluation for individualized goals.  Description: Treatment Interventions: ADL retraining, Functional transfer training, Endurance training, Patient/family training, Equipment evaluation/education, Compensatory technique education, Continued evaluation, Energy conservation, Activityengagement          See flowsheet documentation for full assessment, interventions and recommendations.   Note: Limitation: Decreased endurance, Decreased self-care trans, Decreased high-level ADLs (impaired activity tolerance)     Assessment: Pt is a 80yo male admitted to ICU at [facility] on 5/12/25 as a trauma following a fall at home 2 days prior. Diagnosed w/ subarachnoid hemorrhage. Per neurosurgery, recommend outpt follow up as needed and no further imaging. Cardiology consulted.  No significant PMH impacting his occupational performance. Pt reports difficulty managing stairs at home past 6 months. Pt reports living w/ his wife and son w/ special needs in 2 SH. Pt reports I w/ ADL/ IADL w/ out use of AD. Upon eval, pt alert and oriented. Able to follow directions and participate in conversation. Pt completed bed mobility, grooming and UBD w/ mod I for + time due multiple lines while seated. Deferred OOB or functional mobility at this time per cardiology. Pt is completing ADLs below baseline level of I due to decreased activity tolerance, standing tolerance and endurance. Recommend level III rehab resource intensity when medically stable for discharge from acute care pend progress, medical optimization. Will continue to follow     Rehab Resource Intensity Level, OT: III (Minimum Resource Intensity) (pend progress, medical optimization)

## 2025-05-13 NOTE — DISCHARGE INSTR - AVS FIRST PAGE
Neurosurgery discharge instructions following traumatic head bleed:     Do not take any blood thinning medications for 2 weeks (ie. No Advil. No motrin. No ibuprofen. No Aleve. No Aspirin. No fishoil. No heparin. No antiplatelet / no anticoagulation medication).  Refrain from activity that increases chance of trauma to head or falls. Recommend you take fall precaution.  No strenuous activity or sports.  Return to hospital Emergency Room if you experience worsening / new headache, nausea/vomiting, speech/vision change, seizure, confusion / mental status change, weakness, or other neurological changes.    Continue Keppra 500mg twice daily for 3 days    PACEMAKER INSTRUCTIONS:  Please refer to post pacemaker implantation discharge instructions and restrictions and your pacemaker booklet/temporary card.     Keep incision dry for one week. Leave outer bandage in place for 1 week - it is water proof, and as long as it is fully adhered to your skin you may shower with it.  If it appears as though the bandage is coming off and/or there is any communication to the area of device incision, please then keep the whole area dry for the remaining week.  After 1 week, please remove by pulling all edges away from the center of the bandage. After the bandage is removed, you may then shower normally and get the area wet with soap and water, no scrubbing, and pat dry. Do not use lotions/powders/creams on incision.    No overhead reaching/pushing/pulling/lifting greater than 5-10lbs with left arm for six weeks. Please call the office if you notice redness, swelling, bleeding, or drainage from incision or if you develop fevers.       AFTER PACEMAKER CARE:    If you have any questions, please call [phone] to speak with a nurse (8:30am-4pm, or [phone] after hours). For appointments, please call [phone].      WHAT YOU SHOULD KNOW:   A pacemaker is a small, battery-powered device that is placed under your skin in your upper  chest area with wires placed through a vein that lead directly into the heart. It helps regulate your heart rate and prevent your heart from beating too slowly.                 AFTER YOU LEAVE:     Medicines:     Pain medicine:  You may need medicine to take away or decrease pain.     Learn how to take your medicine. Ask what medicine and how much you should take. Be sure you know how, when, and how often to take it. Usually Over the counter pain medicine is sufficient to control pain (Acetominophen or Ibuprofen) Ask your doctor if you may take these. If this does not control your pain, narcotic pain killers may be prescribed, please call if you need prescription.     Do not wait until the pain is severe before you take your medicine. Tell caregivers if your pain does not decrease.    Pain medicine can make you dizzy or sleepy. Prevent falls by calling someone when you get out of bed or if you need help.        Take your medicine as directed.  Call your healthcare provider if you think your medicine is not helping or if you have side effects. Tell him if you are allergic to any medicine.    Follow up with your cardiologist after your procedure:  You will need a follow-up visit approximately 2 weeks after you leave the hospital. Your cardiologist will check your wound and make sure that your pacemaker is working correctly.     Follow the instructions to check your pacemaker:  Your cardiologist or primary healthcare provider will check your pacemaker and the battery regularly.  He will use a computer to check your pacemaker over the telephone or wireless device which will be given to you.     Pacemaker batteries usually last 8 to 10 years. The pacemaker unit will be replaced when the battery gets low. This is a simpler procedure than the original one to implant your pacemaker.    Wound care:  Keep your incision dry for one week.  Do not use lotions/powders/creams on incision.     Leave outer bandage in place for 1  week - it is water proof, and as long as it is fully adhered to your skin you may shower with it.  If it appears as though the bandage is coming off and/or there is any communication to the area of device incision, please then keep the whole area dry for the remaining week.  After 1 week, please remove by pulling all edges away from the center of the bandage.    Please call the office if you notice redness, swelling, bleeding, or drainage from incision or if you develop fevers.       Activity:   Arm movement and lifting:  Be careful using the arm on the side of your pacemaker. Do not move your arm for the first 24 hours after your procedure. Do not  lift your arm above your shoulder or lift more than 10 pounds for one month after your procedure. Avoid pushing, pulling, or repetitive arm movements for 6 weeks. This helps the leads stay in place and helps your wound heal. Ask your caregiver when you can drive after your procedure. You may move your arm side to side without lifting above your shoulder, and do not need to wear a sling at home.   Driving: you are ok to drive 48 hours after pacemaker is implanted   Sports:  Ask your caregiver when it is okay to play tennis, golf, basketball, or any sport that requires you to lift your arms. Do not play full contact sports, such as football, that could damage your pacemaker. Ask your cardiologist or primary healthcare provider how much and what kinds of physical activity are safe for you.    Living with a pacemaker:   Tell all caregivers you have a pacemaker:  This includes surgeons, radiologists, and medical technicians. You may want to wear a medical alert ID bracelet or necklace that states that you have a pacemaker.    Carry your pacemaker ID card:  Make sure you receive a pacemaker ID card. Carry it with you at all times. It lists important information about your pacemaker. Show it to airport security if you travel.     Avoid electrical interference:  Avoid welding  equipment and other equipment with large magnets or electric fields. These things could interfere with how your pacemaker works. Use your cell phone on the ear opposite from your pacemaker. Do not carry your cell phone in your shirt pocket over your chest.     Some Pacemakers are MRI safe. Ask you doctor if it is safe to proceed with MRI and let the radiologist and staff know you have a pacemaker.     Do not touch the skin around your pacemaker:  This can cause damage to the lead wires or move the pacemaker unit from where it should be.    Contact your cardiologist or primary healthcare provider if:   The area around your pacemaker has increasing amount of pain after surgery. The pain should improve over first few days after implantation.     The skin around your stitches has increasing redness, swelling, or has drainage. This may mean that you have an infection.     You have a fever.     You have chills, a cough, and feel weak or achy. These are also signs of infection.    Your feet or ankles are more swollen than your baseline.     Your Heart rate is less than 50 beats per minute     Seek care immediately if:   Your bandage becomes soaked with blood.     Your pacemaker is swelling rapidly    Your stitches open up.     You feel your heart suddenly beating very slowly or quickly.    You become too weak or dizzy to stand, or you pass out.     Your arm or leg feels warm, tender, and painful. It may look swollen and red.    You have chest pain that does not go away with rest or medicine.     You feel lightheaded, short of breath, and have chest pain.     You cough up blood.        © 2014 [employer] Information is for End User's use only and may not be sold, redistributed or otherwise used for commercial purposes. All illustrations and images included in CareNotes® are the copyrighted property of [employer] or [employer].  The above information is an  only. It is not  intended as medical advice for individual conditions or treatments. Talk to your doctor, nurse or pharmacist before following any medical regimen to see if it is safe and effective for you.

## 2025-05-14 ENCOUNTER — ANESTHESIA EVENT (INPATIENT)
Dept: NON INVASIVE DIAGNOSTICS | Facility: HOSPITAL | Age: 80
DRG: 242 | End: 2025-05-14
Payer: COMMERCIAL

## 2025-05-14 ENCOUNTER — APPOINTMENT (INPATIENT)
Dept: RADIOLOGY | Facility: HOSPITAL | Age: 80
DRG: 242 | End: 2025-05-14
Payer: COMMERCIAL

## 2025-05-14 LAB
ANION GAP SERPL CALCULATED.3IONS-SCNC: 5 MMOL/L (ref 4–13)
ATRIAL RATE: 41 BPM
ATRIAL RATE: 51 BPM
BUN SERPL-MCNC: 19 MG/DL (ref 5–25)
CA-I BLD-SCNC: 1.19 MMOL/L (ref 1.12–1.32)
CALCIUM SERPL-MCNC: 9 MG/DL (ref 8.4–10.2)
CHLORIDE SERPL-SCNC: 111 MMOL/L (ref 96–108)
CO2 SERPL-SCNC: 24 MMOL/L (ref 21–32)
CREAT SERPL-MCNC: 0.81 MG/DL (ref 0.6–1.3)
ERYTHROCYTE [DISTWIDTH] IN BLOOD BY AUTOMATED COUNT: 14 % (ref 11.6–15.1)
GFR SERPL CREATININE-BSD FRML MDRD: 85 ML/MIN/1.73SQ M
GLUCOSE SERPL-MCNC: 95 MG/DL (ref 65–140)
HCT VFR BLD AUTO: 41.6 % (ref 36.5–49.3)
HGB BLD-MCNC: 13.8 G/DL (ref 12–17)
MAGNESIUM SERPL-MCNC: 2.2 MG/DL (ref 1.9–2.7)
MCH RBC QN AUTO: 31 PG (ref 26.8–34.3)
MCHC RBC AUTO-ENTMCNC: 33.2 G/DL (ref 31.4–37.4)
MCV RBC AUTO: 94 FL (ref 82–98)
P AXIS: 68 DEGREES
P AXIS: 79 DEGREES
PHOSPHATE SERPL-MCNC: 3.1 MG/DL (ref 2.3–4.1)
PLATELET # BLD AUTO: 293 THOUSANDS/UL (ref 149–390)
PMV BLD AUTO: 8.9 FL (ref 8.9–12.7)
POTASSIUM SERPL-SCNC: 4.2 MMOL/L (ref 3.5–5.3)
PR INTERVAL: 150 MS
PR INTERVAL: 152 MS
QRS AXIS: -73 DEGREES
QRS AXIS: -83 DEGREES
QRSD INTERVAL: 142 MS
QRSD INTERVAL: 144 MS
QT INTERVAL: 462 MS
QT INTERVAL: 482 MS
QTC INTERVAL: 397 MS
QTC INTERVAL: 425 MS
RBC # BLD AUTO: 4.45 MILLION/UL (ref 3.88–5.62)
SODIUM SERPL-SCNC: 140 MMOL/L (ref 135–147)
T WAVE AXIS: 42 DEGREES
T WAVE AXIS: 53 DEGREES
VENTRICULAR RATE: 41 BPM
VENTRICULAR RATE: 51 BPM
WBC # BLD AUTO: 9.6 THOUSAND/UL (ref 4.31–10.16)

## 2025-05-14 PROCEDURE — C1898 LEAD, PMKR, OTHER THAN TRANS: HCPCS | Performed by: INTERNAL MEDICINE

## 2025-05-14 PROCEDURE — 93010 ELECTROCARDIOGRAM REPORT: CPT | Performed by: INTERNAL MEDICINE

## 2025-05-14 PROCEDURE — 0JH606Z INSERTION OF PACEMAKER, DUAL CHAMBER INTO CHEST SUBCUTANEOUS TISSUE AND FASCIA, OPEN APPROACH: ICD-10-PCS | Performed by: INTERNAL MEDICINE

## 2025-05-14 PROCEDURE — 02HK3JZ INSERTION OF PACEMAKER LEAD INTO RIGHT VENTRICLE, PERCUTANEOUS APPROACH: ICD-10-PCS | Performed by: INTERNAL MEDICINE

## 2025-05-14 PROCEDURE — 71045 X-RAY EXAM CHEST 1 VIEW: CPT

## 2025-05-14 PROCEDURE — 93005 ELECTROCARDIOGRAM TRACING: CPT

## 2025-05-14 PROCEDURE — 83735 ASSAY OF MAGNESIUM: CPT

## 2025-05-14 PROCEDURE — C1785 PMKR, DUAL, RATE-RESP: HCPCS | Performed by: INTERNAL MEDICINE

## 2025-05-14 PROCEDURE — 82330 ASSAY OF CALCIUM: CPT

## 2025-05-14 PROCEDURE — 33208 INSRT HEART PM ATRIAL & VENT: CPT | Performed by: INTERNAL MEDICINE

## 2025-05-14 PROCEDURE — 02H63JZ INSERTION OF PACEMAKER LEAD INTO RIGHT ATRIUM, PERCUTANEOUS APPROACH: ICD-10-PCS | Performed by: INTERNAL MEDICINE

## 2025-05-14 PROCEDURE — 80048 BASIC METABOLIC PNL TOTAL CA: CPT

## 2025-05-14 PROCEDURE — 99232 SBSQ HOSP IP/OBS MODERATE 35: CPT | Performed by: INTERNAL MEDICINE

## 2025-05-14 PROCEDURE — 84100 ASSAY OF PHOSPHORUS: CPT

## 2025-05-14 PROCEDURE — C1892 INTRO/SHEATH,FIXED,PEEL-AWAY: HCPCS | Performed by: INTERNAL MEDICINE

## 2025-05-14 PROCEDURE — C1887 CATHETER, GUIDING: HCPCS | Performed by: INTERNAL MEDICINE

## 2025-05-14 PROCEDURE — 3E0132A INTRODUCTION OF ANTI-INFECTIVE ENVELOPE INTO SUBCUTANEOUS TISSUE, PERCUTANEOUS APPROACH: ICD-10-PCS | Performed by: INTERNAL MEDICINE

## 2025-05-14 PROCEDURE — C1769 GUIDE WIRE: HCPCS | Performed by: INTERNAL MEDICINE

## 2025-05-14 PROCEDURE — 85027 COMPLETE CBC AUTOMATED: CPT

## 2025-05-14 PROCEDURE — 99291 CRITICAL CARE FIRST HOUR: CPT | Performed by: STUDENT IN AN ORGANIZED HEALTH CARE EDUCATION/TRAINING PROGRAM

## 2025-05-14 DEVICE — ENVELOPE CMRM6122 ABSORB MED MR
Type: IMPLANTABLE DEVICE | Site: CHEST  WALL | Status: FUNCTIONAL
Brand: TYRX™

## 2025-05-14 DEVICE — LEAD 3830 US MKT/ 69CM MRI LBBAP
Type: IMPLANTABLE DEVICE | Site: HEART | Status: FUNCTIONAL
Brand: SELECTSECURE™ MRI SURESCAN™

## 2025-05-14 DEVICE — IPG W1DR01 AZURE XT DR MRI USA
Type: IMPLANTABLE DEVICE | Site: CHEST  WALL | Status: FUNCTIONAL
Brand: AZURE™ XT DR MRI SURESCAN™

## 2025-05-14 DEVICE — LEAD 5076-52 MRI US RCMCRD
Type: IMPLANTABLE DEVICE | Site: HEART | Status: FUNCTIONAL
Brand: CAPSUREFIX NOVUS MRI™ SURESCAN®

## 2025-05-14 RX ORDER — SODIUM CHLORIDE 9 MG/ML
INJECTION, SOLUTION INTRAVENOUS CONTINUOUS PRN
Status: DISCONTINUED | OUTPATIENT
Start: 2025-05-14 | End: 2025-05-14

## 2025-05-14 RX ORDER — PROPOFOL 10 MG/ML
INJECTION, EMULSION INTRAVENOUS CONTINUOUS PRN
Status: DISCONTINUED | OUTPATIENT
Start: 2025-05-14 | End: 2025-05-14

## 2025-05-14 RX ORDER — ONDANSETRON 2 MG/ML
4 INJECTION INTRAMUSCULAR; INTRAVENOUS EVERY 8 HOURS PRN
Status: DISCONTINUED | OUTPATIENT
Start: 2025-05-14 | End: 2025-05-15 | Stop reason: HOSPADM

## 2025-05-14 RX ORDER — FENTANYL CITRATE 50 UG/ML
INJECTION, SOLUTION INTRAMUSCULAR; INTRAVENOUS AS NEEDED
Status: DISCONTINUED | OUTPATIENT
Start: 2025-05-14 | End: 2025-05-14

## 2025-05-14 RX ORDER — OXYCODONE HYDROCHLORIDE 5 MG/1
5 TABLET ORAL EVERY 4 HOURS PRN
Refills: 0 | Status: DISCONTINUED | OUTPATIENT
Start: 2025-05-14 | End: 2025-05-15 | Stop reason: HOSPADM

## 2025-05-14 RX ORDER — EPHEDRINE SULFATE 50 MG/ML
INJECTION INTRAVENOUS AS NEEDED
Status: DISCONTINUED | OUTPATIENT
Start: 2025-05-14 | End: 2025-05-14

## 2025-05-14 RX ORDER — LIDOCAINE HYDROCHLORIDE 10 MG/ML
INJECTION, SOLUTION EPIDURAL; INFILTRATION; INTRACAUDAL; PERINEURAL CODE/TRAUMA/SEDATION MEDICATION
Status: DISCONTINUED | OUTPATIENT
Start: 2025-05-14 | End: 2025-05-14 | Stop reason: HOSPADM

## 2025-05-14 RX ORDER — PROPOFOL 10 MG/ML
INJECTION, EMULSION INTRAVENOUS AS NEEDED
Status: DISCONTINUED | OUTPATIENT
Start: 2025-05-14 | End: 2025-05-14

## 2025-05-14 RX ORDER — CEFAZOLIN SODIUM 1 G/3ML
INJECTION, POWDER, FOR SOLUTION INTRAMUSCULAR; INTRAVENOUS AS NEEDED
Status: DISCONTINUED | OUTPATIENT
Start: 2025-05-14 | End: 2025-05-14

## 2025-05-14 RX ORDER — LIDOCAINE HYDROCHLORIDE 10 MG/ML
INJECTION, SOLUTION EPIDURAL; INFILTRATION; INTRACAUDAL; PERINEURAL AS NEEDED
Status: DISCONTINUED | OUTPATIENT
Start: 2025-05-14 | End: 2025-05-14

## 2025-05-14 RX ORDER — HYDROMORPHONE HCL IN WATER/PF 6 MG/30 ML
0.2 PATIENT CONTROLLED ANALGESIA SYRINGE INTRAVENOUS EVERY 4 HOURS PRN
Status: DISCONTINUED | OUTPATIENT
Start: 2025-05-14 | End: 2025-05-15 | Stop reason: HOSPADM

## 2025-05-14 RX ADMIN — LOSARTAN POTASSIUM 12.5 MG: 25 TABLET, FILM COATED ORAL at 08:27

## 2025-05-14 RX ADMIN — OXYCODONE HYDROCHLORIDE 5 MG: 5 TABLET ORAL at 16:29

## 2025-05-14 RX ADMIN — LEVETIRACETAM 500 MG: 500 TABLET, FILM COATED ORAL at 20:40

## 2025-05-14 RX ADMIN — SODIUM CHLORIDE: 0.9 INJECTION, SOLUTION INTRAVENOUS at 11:53

## 2025-05-14 RX ADMIN — PROPOFOL 20 MG: 10 INJECTION, EMULSION INTRAVENOUS at 12:59

## 2025-05-14 RX ADMIN — PANTOPRAZOLE SODIUM 40 MG: 40 TABLET, DELAYED RELEASE ORAL at 05:17

## 2025-05-14 RX ADMIN — OXYCODONE HYDROCHLORIDE 5 MG: 5 TABLET ORAL at 20:48

## 2025-05-14 RX ADMIN — PHENYLEPHRINE HYDROCHLORIDE 100 MCG: 50 INJECTION INTRAVENOUS at 12:24

## 2025-05-14 RX ADMIN — ONDANSETRON 4 MG: 2 INJECTION INTRAMUSCULAR; INTRAVENOUS at 13:57

## 2025-05-14 RX ADMIN — ACETAMINOPHEN 975 MG: 325 TABLET, FILM COATED ORAL at 16:12

## 2025-05-14 RX ADMIN — CEFAZOLIN 2000 MG: 1 INJECTION, POWDER, FOR SOLUTION INTRAMUSCULAR; INTRAVENOUS at 12:10

## 2025-05-14 RX ADMIN — FENTANYL CITRATE 25 MCG: 50 INJECTION INTRAMUSCULAR; INTRAVENOUS at 12:42

## 2025-05-14 RX ADMIN — FENTANYL CITRATE 50 MCG: 50 INJECTION INTRAMUSCULAR; INTRAVENOUS at 12:24

## 2025-05-14 RX ADMIN — ACETAMINOPHEN 975 MG: 325 TABLET, FILM COATED ORAL at 08:26

## 2025-05-14 RX ADMIN — PHENYLEPHRINE HYDROCHLORIDE 200 MCG: 50 INJECTION INTRAVENOUS at 12:51

## 2025-05-14 RX ADMIN — EPHEDRINE SULFATE 5 MG: 50 INJECTION INTRAVENOUS at 12:33

## 2025-05-14 RX ADMIN — LEVETIRACETAM 500 MG: 500 TABLET, FILM COATED ORAL at 08:27

## 2025-05-14 RX ADMIN — ENOXAPARIN SODIUM 30 MG: 30 INJECTION SUBCUTANEOUS at 20:40

## 2025-05-14 RX ADMIN — PHENYLEPHRINE HYDROCHLORIDE 40 MCG/MIN: 50 INJECTION INTRAVENOUS at 12:22

## 2025-05-14 RX ADMIN — LIDOCAINE HYDROCHLORIDE 40 MG: 10 INJECTION, SOLUTION EPIDURAL; INFILTRATION; INTRACAUDAL at 12:06

## 2025-05-14 RX ADMIN — FAMOTIDINE 20 MG: 20 TABLET, FILM COATED ORAL at 08:27

## 2025-05-14 RX ADMIN — ATORVASTATIN CALCIUM 20 MG: 20 TABLET, FILM COATED ORAL at 23:07

## 2025-05-14 RX ADMIN — EPHEDRINE SULFATE 5 MG: 50 INJECTION INTRAVENOUS at 12:32

## 2025-05-14 RX ADMIN — PROPOFOL 50 MG: 10 INJECTION, EMULSION INTRAVENOUS at 12:06

## 2025-05-14 RX ADMIN — PROPOFOL 40 MCG/KG/MIN: 10 INJECTION, EMULSION INTRAVENOUS at 12:08

## 2025-05-14 RX ADMIN — LEVOTHYROXINE SODIUM 175 MCG: 175 TABLET ORAL at 05:21

## 2025-05-14 RX ADMIN — FENTANYL CITRATE 25 MCG: 50 INJECTION INTRAMUSCULAR; INTRAVENOUS at 12:16

## 2025-05-14 NOTE — OCCUPATIONAL THERAPY NOTE
Occupational Therapy Cancel Note     Patient Name: Leeroy Santos  Today's Date: 5/14/2025  Problem List  Principal Problem:    SAH (subarachnoid hemorrhage) (HCC)  Active Problems:    HTN (hypertension)    Hyperlipidemia    Sinus pause    Bradycardia    Fall          05/14/25 1041   Note Type   Note Type Cancelled Session  (Wed 5/14/25)   Cancel Reasons Medical status  (Per Miryam PORTILLO at ICU rounds not appropriate. Plan for pacemaker today. Will continue to follow)       Bety Gabriel, OTR/L  [id number]  [id number]

## 2025-05-14 NOTE — ASSESSMENT & PLAN NOTE
Frequent episodes of ventricular standstill   Started epi gtt with improvement on 5/12 5/13: Floated transvenous pacer at bedside    Plan:   PPM planned on 5/14   Cardiology consult   Maintain electrolytes   Fully paced on monitor.   Monitor venosus pacer site and pacer box

## 2025-05-14 NOTE — PHYSICAL THERAPY NOTE
PHYSICAL THERAPY CANCELLATION NOTE          Patient Name: Leeroy Santos  Today's Date: 5/14/2025 05/14/25 1101   Note Type   Note Type Cancelled Session   Cancel Reasons Medical status   Assessment   Assessment PT orders received, chart review performed. Spoke to BANDAR Nunn during ICU mobility rounds. At this time pt is not appropriate for PT/mobility due to plan for pacemaker placement today. PT will continue to follow pt as appropriate and as schedule allows.           Brittany Fairchild, PT, DPT  05/14/25

## 2025-05-14 NOTE — PROGRESS NOTES
General Cardiology   Progress Note -  Team One   Leeroy Santos 79 y.o. male MRN: [id number]    Unit/Bed#: ICU 13 Encounter: [id number]    Assessment  1. Recurrent syncope w/ resultant traumatic fall.  2. High grade AVB w/ventricular standstill.   ECG on admission demonstrated sinus bradycardia, HR 41 bpm, bifascicular block.  Telemetry review 5/12-5/13, currently in normal sinus rhythm with heart rates in the 70s to 80s.  Having intermittent episodes of CHB with ventricular standstill up to 8 seconds.   S/p TVP placement on 5/13.   Telemetry review 5/13-5/14; V Paced rate 70 bpm  Not previously or currently on BB/AV juan ramon blocking agents.  Electrolytes stable on BMP  TSH 9.2, free T4 0.97          3. Preserved biventricular systolic function.  4. Mild aortic valve stenosis.  Appears stable/compensated.  TTE 5/13/2025; EF 60%, grade 1 DD, LA mildly dilated, mild AS.  5. SAH  Neurosurgery team following.  Nonsurgical management at this time.  Not previously currently on AP/AC  6. Hypertension  BP stable last recorded 119/70.  Outpatient BP regimen's; losartan 12.5 mg daily  Inpatient BP regimen; losartan 12.5 mg daily.  7. Dyslipidemia  No recent lipid profile for review.  On atorvastatin 20 mg daily.     Plan  Pt denies any specific cardiac or respiratory complaints at this time.  No current supplemental O2 requirements with stable O2 saturations.  Euvolemic on exam.  S/p TVP placement on 5/13 via right IJ.  Currently V-paced rate 70 bpm  Tentative plan for PPM implant today. Maintain NPO status.   Continue low-dose losartan.  Avoid AVNB agents.   Continue to monitor closely on telemetry.    Subjective  Review of Systems   Constitutional: Negative for chills, fever and malaise/fatigue.   Eyes:  Negative for visual disturbance.   Cardiovascular:  Negative for chest pain, dyspnea on exertion, leg swelling, orthopnea and palpitations.   Respiratory:  Negative for cough and shortness of breath.    Gastrointestinal:   "Negative for abdominal pain.   Neurological:  Negative for dizziness, headaches and light-headedness.       Objective:   Physical Exam  Vitals and nursing note reviewed.   Constitutional:       General: He is not in acute distress.     Appearance: He is not diaphoretic.   HENT:      Head: Normocephalic and atraumatic.     Eyes:      General: No scleral icterus.      Cardiovascular:      Rate and Rhythm: Normal rate.      Heart sounds: No murmur heard.     Comments: V paced at 70  Pulmonary:      Effort: Pulmonary effort is normal.      Breath sounds: No wheezing or rales.   Abdominal:      Palpations: Abdomen is soft.     Musculoskeletal:      Right lower leg: No edema.      Left lower leg: No edema.     Skin:     General: Skin is warm and dry.     Neurological:      General: No focal deficit present.      Mental Status: He is alert and oriented to person, place, and time.     Psychiatric:         Mood and Affect: Mood normal.         Vitals: Blood pressure 119/70, pulse 69, temperature 98 °F (36.7 °C), temperature source Oral, resp. rate 22, height 5' 8\" (1.727 m), weight 70.8 kg (156 lb 1.4 oz), SpO2 97%.,     Body mass index is 23.73 kg/m².,   Systolic (24hrs), Av , Min:103 , Max:165     Diastolic (24hrs), Av, Min:58, Max:94      Intake/Output Summary (Last 24 hours) at 2025 1044  Last data filed at 2025 0800  Gross per 24 hour   Intake 480 ml   Output 2725 ml   Net -2245 ml     Weight (last 2 days)       Date/Time Weight    25 0322 70.8 (156.09)    25 0710 76.3 (168.21)    25 0234 76.3 (168.21)    25 1918 75.5 (166.45)            LABORATORY RESULTS  Results from last 7 days   Lab Units 25  1338   CK TOTAL U/L 131     CBC with diff:   Results from last 7 days   Lab Units 25  0504 25  0425 25  1338   WBC Thousand/uL 9.60 15.09* 7.20   HEMOGLOBIN g/dL 13.8 12.0 12.6   HEMATOCRIT % 41.6 36.0* 38.5   MCV fL 94 94 94   PLATELETS Thousands/uL 293 356 " "325   RBC Million/uL 4.45 3.84* 4.10   MCH pg 31.0 31.3 30.7   MCHC g/dL 33.2 33.3 32.7   RDW % 14.0 13.9 13.8   MPV fL 8.9 9.1 8.8*   NRBC AUTO /100 WBCs  --  0 0       CMP:  Results from last 7 days   Lab Units 05/14/25  0504 05/13/25  0425 05/12/25  1338   POTASSIUM mmol/L 4.2 4.0 4.4   CHLORIDE mmol/L 111* 108 109*   CO2 mmol/L 24 24 27   BUN mg/dL 19 22 25   CREATININE mg/dL 0.81 0.94 0.87   CALCIUM mg/dL 9.0 8.8 9.3   AST U/L  --  12* 13   ALT U/L  --  10 10   ALK PHOS U/L  --  46 49   EGFR ml/min/1.73sq m 85 77 82       BMP:  Results from last 7 days   Lab Units 05/14/25  0504 05/13/25  0425 05/12/25  1338   POTASSIUM mmol/L 4.2 4.0 4.4   CHLORIDE mmol/L 111* 108 109*   CO2 mmol/L 24 24 27   BUN mg/dL 19 22 25   CREATININE mg/dL 0.81 0.94 0.87   CALCIUM mg/dL 9.0 8.8 9.3       No results found for: \"NTBNP\"     Results from last 7 days   Lab Units 05/14/25  0504 05/13/25  0425   MAGNESIUM mg/dL 2.2 2.1             Results from last 7 days   Lab Units 05/12/25  1724   TSH 3RD GENERATON uIU/mL 9.230*   FREE T4 ng/dL 0.97             Lipid Profile:   No results found for: \"CHOL\"  No results found for: \"HDL\"  No results found for: \"LDLCALC\"  No results found for: \"TRIG\"    Cardiac testing:   No results found for this or any previous visit.    No results found for this or any previous visit.    No results found for this or any previous visit.    No valid procedures specified.  No results found for this or any previous visit.      Meds/Allergies   all current active meds have been reviewed and current meds:   Current Facility-Administered Medications:     acetaminophen (TYLENOL) tablet 650 mg, Q6H PRN    acetaminophen (TYLENOL) tablet 975 mg, Q8H MARLA    atorvastatin (LIPITOR) tablet 20 mg, HS    ceFAZolin (ANCEF) IVPB (premix in dextrose) 2,000 mg 50 mL, Once    [Held by provider] enoxaparin (LOVENOX) subcutaneous injection 30 mg, Q12H MARLA    famotidine (PEPCID) tablet 20 mg, Daily    levETIRAcetam (KEPPRA) tablet " 500 mg, Q12H MARLA    levothyroxine tablet 175 mcg, Early Morning    losartan (COZAAR) tablet 12.5 mg, QAM    pantoprazole (PROTONIX) EC tablet 40 mg, Early Morning         Counseling / Coordination of Care  Total floor / unit time spent today 20 minutes.  Greater than 50% of total time was spent with the patient and / or family counseling and / or coordination of care.      ** Please Note: Dragon 360 Dictation voice to text software may have been used in the creation of this document. **

## 2025-05-14 NOTE — PROGRESS NOTES
"Progress Note - Critical Care/ICU   Name: Leeroy Santos 79 y.o. male I MRN: [id number]  Unit/Bed#: ICU 13 I Date of Admission: 5/12/2025   Date of Service: 5/14/2025 I Hospital Day: 1   { ?Quick Links I Problem List I PORCH I Billing Tip:71714}    Critical Care Interval Transfer Note:    Brief Hospital Summary: {Hospital Course:87923} ***    Barriers to discharge:   ***     Consults: IP CONSULT TO TRAUMA SURGERY  IP CONSULT TO NEUROSURGERY  IP CONSULT TO CARDIOLOGY    {Recommended to review admission imaging for incidental findings and document in discharge navigator:47386}     {Discharge Plan:09541}     {If known case management plans document here.  Will auto delete on signing if nothing entered (Optional):12459::\"***\"}  Patient seen and evaluated by Critical Care today and deemed to be appropriate for transfer to {CC Transfer Levels of Care:66082}. Spoke to *** from *** to accept transfer. Critical care can be contacted via SecureChat with any questions or concerns. Please use the Critical Care AP Role in Secure Chat for any provider inquires until the patient is transferred out of the ICU or until tomorrow at 0600.  "

## 2025-05-14 NOTE — PROGRESS NOTES
"Progress Note - Critical Care/ICU   Name: Leeroy Santos 79 y.o. male I MRN: [id number]  Unit/Bed#: ICU 13 I Date of Admission: 5/12/2025   Date of Service: 5/14/2025 I Hospital Day: 1   { ?Quick Links I Problem List I PORCH I Billing Tip:46473}    Interval Events:   S/p PPM placement    Pertinent New Data:   {Pertinent vitals, if recorded in epic can type '.' RRVITALS:729593}  {Invasive Device (Optional):49092}  {If exam block desired, Type \".\" CCPHYSEXAM\" to load physical exam:81916::\"***\"}  {Labs you have reviewed:07135}  {Radiology Studies:92509}{Results Review Statement:80570::\"No pertinent imaging studies reviewed.\"}    Assessment and Plan  {Plan (Optional):33708}    Billing Level:  Critical Care Time Statement: Upon my evaluation, this patient had a high probability of imminent or life-threatening deterioration due to ***, which required my direct attention, intervention, and personal management.  I spent a total of *** minutes directly providing critical care services, including {[facility] Critical Care Interventions:22292}. This time is exclusive of procedures, teaching, family meetings, and any prior time recorded by providers other than myself.      MODESTO Blanc  "

## 2025-05-14 NOTE — ASSESSMENT & PLAN NOTE
CT Head: Serpiginous asymmetric hyperattenuation in the posterior right parietal lobe may reflect a small focus of subarachnoid hemorrhage. No intraparenchymal hemorrhage or significant mass effect.   S/P syncopal falls at home.   Delayed presentation to hospital given unsure policy with [facility] insurance     Plan:  Keppra for Seizure ppx   Q 1 hour neuro checks   STAT CT scan for change in GCS > 2   Syncopal workup revealed episodes of ventricular standstill   Seizure precautions   Monitor telemetry   Started Lovenox

## 2025-05-14 NOTE — PLAN OF CARE
Problem: PAIN - ADULT  Goal: Verbalizes/displays adequate comfort level or baseline comfort level  Description: Interventions:- Encourage patient to monitor pain and request assistance- Assess pain using appropriate pain scale- Administer analgesics based on type and severity of pain and evaluate response- Implement non-pharmacological measures as appropriate and evaluate response- Consider cultural and social influences on pain and pain management- Notify physician/advanced practitioner if interventions unsuccessful or patient reports new pain  Outcome: Progressing     Problem: INFECTION - ADULT  Goal: Absence or prevention of progression during hospitalization  Description: INTERVENTIONS:- Assess and monitor for signs and symptoms of infection- Monitor lab/diagnostic results- Monitor all insertion sites, i.e. indwelling lines, tubes, and drains- Monitor endotracheal if appropriate and nasal secretions for changes in amount and color- [place] appropriate cooling/warming therapies per order- Administer medications as ordered- Instruct and encourage patient and family to use good hand hygiene technique- Identify and instruct in appropriate isolation precautions for identified infection/condition  Outcome: Progressing  Goal: Absence of fever/infection during neutropenic period  Description: INTERVENTIONS:- Monitor WBC  Outcome: Progressing

## 2025-05-14 NOTE — ASSESSMENT & PLAN NOTE
Syncopal fall at home. Feels his head get fuzzy and then falls. Once had brief LOC.   Delayed presentation  Injury: small SAH right parietal lobe.   Cardiac in origin in setting of bradycardia with ventricular standstill.     Plan:   PT/OT when able  Fall Pecautions

## 2025-05-14 NOTE — PROGRESS NOTES
Progress Note - Critical Care/ICU   Name: Leeroy Santos 79 y.o. male I MRN: [id number]  Unit/Bed#: ICU 13 I Date of Admission: 5/12/2025   Date of Service: 5/14/2025 I Hospital Day: 1      Assessment & Plan  SAH (subarachnoid hemorrhage) (HCC)  CT Head: Serpiginous asymmetric hyperattenuation in the posterior right parietal lobe may reflect a small focus of subarachnoid hemorrhage. No intraparenchymal hemorrhage or significant mass effect.   S/P syncopal falls at home.   Delayed presentation to hospital given unsure policy with [facility] insurance     Plan:  Keppra for Seizure ppx   Q 1 hour neuro checks   STAT CT scan for change in GCS > 2   Syncopal workup revealed episodes of ventricular standstill   Seizure precautions   Monitor telemetry   Started Lovenox  HTN (hypertension)  Continue losartan   Hyperlipidemia  Continue statin  Sinus pause  Frequent episodes of ventricular standstill   Started epi gtt with improvement on 5/12 5/13: Floated transvenous pacer at bedside    Plan:   PPM planned on 5/14   Cardiology consult   Maintain electrolytes   Fully paced on monitor.   Monitor venosus pacer site and pacer box   Bradycardia  Into the 30s with what appears to be periods ventricular standstill   Placed on epi gtt with improvement in HR   BP stable   ECHO: EF 60%. Systolic function is normal. Wall motion is normal. Diastolic function is mildly abnormal, consistent with grade I (abnormal) relaxation.   S/P transvenous pacemaker    Plan:  Cardiology following   PPM planned for today   Monitor TVP  Fall  Syncopal fall at home. Feels his head get fuzzy and then falls. Once had brief LOC.   Delayed presentation  Injury: small SAH right parietal lobe.   Cardiac in origin in setting of bradycardia with ventricular standstill.     Plan:   PT/OT when able  Fall Pecautions   Disposition: Critical care    ICU Core Measures     A: Assess, Prevent, and Manage Pain Has pain been assessed? Yes  Need for changes to pain regimen? No    B: Both SAT/SAT  N/A   C: Choice of Sedation RASS Goal: N/A patient not on sedation  Need for changes to sedation or analgesia regimen? No   D: Delirium CAM-ICU: Negative   E: Early Mobility  Plan for early mobility? Yes   F: Family Engagement Plan for family engagement today? Yes       Antibiotic Review: Awaiting culture results.       Prophylaxis:  VTE VTE covered by:  [Held by provider] enoxaparin, Subcutaneous, 30 mg at 05/13/25 2205       Stress Ulcer  covered byfamotidine (PEPCID) 20 mg tablet [[id number]] (Long-Term Med), famotidine (PEPCID) tablet 20 mg [[id number]], omeprazole (PriLOSEC) 20 mg delayed release capsule [[id number]] (Long-Term Med), pantoprazole (PROTONIX) EC tablet 40 mg [[id number]]         24 Hour Events : Paced throughout the night. Hemodynamically stable. NPO and DVT held in preparation for cath lab in AM.   Subjective   Review of Systems: Review of Systems   Respiratory:  Negative for cough.    Cardiovascular:  Negative for chest pain.   Gastrointestinal:  Negative for abdominal pain.   Neurological:  Negative for dizziness, syncope and light-headedness.   Psychiatric/Behavioral:  Negative for agitation. The patient is not nervous/anxious.        Objective :                   Vitals I/O      Most Recent Min/Max in 24hrs   Temp 98.2 °F (36.8 °C) Temp  Min: 97.9 °F (36.6 °C)  Max: 98.2 °F (36.8 °C)   Pulse 69 Pulse  Min: 47  Max: 98   Resp 17 Resp  Min: 15  Max: 54   /91 BP  Min: 103/65  Max: 165/79   O2 Sat 94 % SpO2  Min: 93 %  Max: 100 %      Intake/Output Summary (Last 24 hours) at 5/14/2025 0429  Last data filed at 5/14/2025 0301  Gross per 24 hour   Intake 552 ml   Output 2350 ml   Net -1798 ml       Diet NPO; Sips with meds    Invasive Monitoring           Physical Exam   Physical Exam  Eyes:      Pupils: Pupils are equal, round, and reactive to light.   Skin:     General: Skin is warm.      Capillary Refill: Capillary refill takes less than 2 seconds.   HENT:      Head:  Normocephalic.      Mouth/Throat:      Mouth: Mucous membranes are moist.   Cardiovascular:      Rate and Rhythm: Normal rate. Paced rhythm.      Pulses: Normal pulses.   Abdominal:      Palpations: Abdomen is soft.      Tenderness: There is no abdominal tenderness.   Constitutional:       General: He is not in acute distress.  Pulmonary:      Effort: Pulmonary effort is normal.      Breath sounds: Normal breath sounds.   Neurological:      General: No focal deficit present.      Mental Status: He is alert and oriented to person, place and time. He is not agitated.      Motor: Strength full and intact in all extremities.          Diagnostic Studies        Lab Results: I have reviewed the following results:     Medications:  Scheduled PRN   acetaminophen, 975 mg, Q8H MARLA  atorvastatin, 20 mg, HS  ceFAZolin, 2,000 mg, Once  [Held by provider] enoxaparin, 30 mg, Q12H MARLA  famotidine, 20 mg, Daily  levETIRAcetam, 500 mg, Q12H MARLA  levothyroxine, 175 mcg, Early Morning  losartan, 12.5 mg, QAM  pantoprazole, 40 mg, Early Morning      acetaminophen, 650 mg, Q6H PRN       Continuous          Labs:   CBC    Recent Labs     05/12/25 1338 05/13/25  0425   WBC 7.20 15.09*   HGB 12.6 12.0   HCT 38.5 36.0*    356     BMP    Recent Labs     05/12/25 1338 05/13/25  0425   SODIUM 141 139   K 4.4 4.0   * 108   CO2 27 24   AGAP 5 7   BUN 25 22   CREATININE 0.87 0.94   CALCIUM 9.3 8.8       Coags    No recent results     Additional Electrolytes  Recent Labs     05/13/25  0425   MG 2.1   PHOS 3.6          Blood Gas    No recent results  No recent results LFTs  Recent Labs     05/12/25 1338 05/13/25  0425   ALT 10 10   AST 13 12*   ALKPHOS 49 46   ALB 4.1 3.7   TBILI 0.36 0.31       Infectious  No recent results  Glucose  Recent Labs     05/12/25 1338 05/13/25  0425   GLUC 87 184*

## 2025-05-14 NOTE — ASSESSMENT & PLAN NOTE
Into the 30s with what appears to be periods ventricular standstill   Placed on epi gtt with improvement in HR   BP stable   ECHO: EF 60%. Systolic function is normal. Wall motion is normal. Diastolic function is mildly abnormal, consistent with grade I (abnormal) relaxation.   S/P transvenous pacemaker    Plan:  Cardiology following   PPM planned for today   Monitor TVP

## 2025-05-14 NOTE — ANESTHESIA PREPROCEDURE EVALUATION
"Procedure:  Cardiac pacer implant (Chest)    Complete heart block with intermittent escape rhythm    TVP placed via R IJ, VVI at 60, mA 60, 100% capture, for PPM today  S/p fall on 5/12, traumatic SAH present, no AP/AC per NSY    Relevant Problems   CARDIO   (+) HTN (hypertension)   (+) Hyperlipidemia   (+) Sinus pause     TTE 5/13/25       Left Ventricle: Left ventricular cavity size is normal. Wall thickness is normal. The left ventricular ejection fraction is 60%. Systolic function is normal. Wall motion is normal. Diastolic function is mildly abnormal, consistent with grade I (abnormal) relaxation.    Left Atrium: The atrium is mildly dilated.    Aortic Valve: The leaflets are moderately calcified. There is mild stenosis. The aortic valve mean gradient is 9 mmHg. The dimensionless velocity index is 0.61. The aortic valve area is 2.33 cm2.    Physical Exam    Airway     Mallampati score: II  TM Distance: >3 FB  Neck ROM: full      Cardiovascular      Dental       Pulmonary      Neurological      Other Findings        Anesthesia Plan  ASA Score- 3     Anesthesia Type- MAC with ASA Monitors.         Additional Monitors:     Airway Plan:     Comment: Recent labs personally reviewed:  Lab Results       Component                Value               Date                       WBC                      9.60                05/14/2025                 HGB                      13.8                05/14/2025                 PLT                      293                 05/14/2025            Lab Results       Component                Value               Date                       K                        4.2                 05/14/2025                 BUN                      19                  05/14/2025                 CREATININE               0.81                05/14/2025            No results found for: \"PTT\"   No results found for: \"INR\"    Blood type B    Patient was consented for sedation with IV anesthetic. Discussed " that we will maintain spontaneous respirations and utilize supplemental O2. I discussed the risks of aspiration, hypoxia, laryngospasm and bronchospasm. I discussed the scenarios related to conversion to general anesthetic. All questions answered.     I, Sophia Caruso MD, have personally seen and evaluated the patient prior to anesthetic care.  I have reviewed the pre-anesthetic record, medical history, allergies, medications and any other medical records if appropriate to the anesthetic care.  If a CRNA is involved in the case, I have reviewed the CRNA assessment, if present, and agree. Patient consented for IV Sedation, general anesthesia as back up. Discussed risks of aspiration, IV infiltration, indicationsfor conversion to general anesthesia. All questions and concerns addressed.   .       Plan Factors-Exercise tolerance (METS): >4 METS.    Chart reviewed. EKG reviewed. Imaging results reviewed. Existing labs reviewed. Patient summary reviewed.    Patient is not a current smoker.  Patient did not smoke on day of surgery.    Obstructive sleep apnea risk education given perioperatively.        Induction-     Postoperative Plan- .   Monitoring Plan - Monitoring plan - standard ASA monitoring      Perioperative Resuscitation Plan - Level 1 - Full Code.       Informed Consent- Anesthetic plan and risks discussed with patient.  I personally reviewed this patient with the CRNA. Discussed and agreed on the Anesthesia Plan with the CRNA..      NPO Status:  No vitals data found for the desired time range.

## 2025-05-15 ENCOUNTER — APPOINTMENT (INPATIENT)
Dept: RADIOLOGY | Facility: HOSPITAL | Age: 80
DRG: 242 | End: 2025-05-15
Payer: COMMERCIAL

## 2025-05-15 VITALS
RESPIRATION RATE: 18 BRPM | DIASTOLIC BLOOD PRESSURE: 66 MMHG | SYSTOLIC BLOOD PRESSURE: 131 MMHG | HEIGHT: 68 IN | BODY MASS INDEX: 24.79 KG/M2 | OXYGEN SATURATION: 98 % | WEIGHT: 163.58 LBS | HEART RATE: 71 BPM | TEMPERATURE: 97.8 F

## 2025-05-15 PROBLEM — R00.1 BRADYCARDIA: Status: RESOLVED | Noted: 2025-05-13 | Resolved: 2025-05-15

## 2025-05-15 LAB
ANION GAP SERPL CALCULATED.3IONS-SCNC: 5 MMOL/L (ref 4–13)
ATRIAL RATE: 60 BPM
ATRIAL RATE: 60 BPM
BUN SERPL-MCNC: 23 MG/DL (ref 5–25)
CALCIUM SERPL-MCNC: 9 MG/DL (ref 8.4–10.2)
CHLORIDE SERPL-SCNC: 106 MMOL/L (ref 96–108)
CO2 SERPL-SCNC: 28 MMOL/L (ref 21–32)
CREAT SERPL-MCNC: 1.02 MG/DL (ref 0.6–1.3)
ERYTHROCYTE [DISTWIDTH] IN BLOOD BY AUTOMATED COUNT: 13.9 % (ref 11.6–15.1)
GFR SERPL CREATININE-BSD FRML MDRD: 70 ML/MIN/1.73SQ M
GLUCOSE SERPL-MCNC: 90 MG/DL (ref 65–140)
HCT VFR BLD AUTO: 42.5 % (ref 36.5–49.3)
HGB BLD-MCNC: 14 G/DL (ref 12–17)
MCH RBC QN AUTO: 31.3 PG (ref 26.8–34.3)
MCHC RBC AUTO-ENTMCNC: 32.9 G/DL (ref 31.4–37.4)
MCV RBC AUTO: 95 FL (ref 82–98)
P AXIS: 73 DEGREES
P AXIS: 77 DEGREES
PLATELET # BLD AUTO: 278 THOUSANDS/UL (ref 149–390)
PMV BLD AUTO: 8.9 FL (ref 8.9–12.7)
POTASSIUM SERPL-SCNC: 4.7 MMOL/L (ref 3.5–5.3)
PR INTERVAL: 144 MS
PR INTERVAL: 160 MS
QRS AXIS: -77 DEGREES
QRS AXIS: -78 DEGREES
QRSD INTERVAL: 134 MS
QRSD INTERVAL: 134 MS
QT INTERVAL: 458 MS
QT INTERVAL: 460 MS
QTC INTERVAL: 458 MS
QTC INTERVAL: 460 MS
RBC # BLD AUTO: 4.48 MILLION/UL (ref 3.88–5.62)
SODIUM SERPL-SCNC: 139 MMOL/L (ref 135–147)
T WAVE AXIS: 56 DEGREES
T WAVE AXIS: 61 DEGREES
VENTRICULAR RATE: 60 BPM
VENTRICULAR RATE: 60 BPM
WBC # BLD AUTO: 9.87 THOUSAND/UL (ref 4.31–10.16)

## 2025-05-15 PROCEDURE — 99238 HOSP IP/OBS DSCHRG MGMT 30/<: CPT

## 2025-05-15 PROCEDURE — NC001 PR NO CHARGE: Performed by: STUDENT IN AN ORGANIZED HEALTH CARE EDUCATION/TRAINING PROGRAM

## 2025-05-15 PROCEDURE — 97116 GAIT TRAINING THERAPY: CPT

## 2025-05-15 PROCEDURE — 97164 PT RE-EVAL EST PLAN CARE: CPT

## 2025-05-15 PROCEDURE — 93010 ELECTROCARDIOGRAM REPORT: CPT | Performed by: INTERNAL MEDICINE

## 2025-05-15 PROCEDURE — 97535 SELF CARE MNGMENT TRAINING: CPT

## 2025-05-15 PROCEDURE — 85027 COMPLETE CBC AUTOMATED: CPT

## 2025-05-15 PROCEDURE — 99024 POSTOP FOLLOW-UP VISIT: CPT | Performed by: INTERNAL MEDICINE

## 2025-05-15 PROCEDURE — 97168 OT RE-EVAL EST PLAN CARE: CPT

## 2025-05-15 PROCEDURE — 71046 X-RAY EXAM CHEST 2 VIEWS: CPT

## 2025-05-15 PROCEDURE — 80048 BASIC METABOLIC PNL TOTAL CA: CPT

## 2025-05-15 RX ORDER — LEVETIRACETAM 500 MG/1
500 TABLET ORAL EVERY 12 HOURS SCHEDULED
Qty: 6 TABLET | Refills: 0 | Status: SHIPPED | OUTPATIENT
Start: 2025-05-15

## 2025-05-15 RX ADMIN — LEVETIRACETAM 500 MG: 500 TABLET, FILM COATED ORAL at 08:10

## 2025-05-15 RX ADMIN — Medication 2.5 MG: at 07:10

## 2025-05-15 RX ADMIN — ACETAMINOPHEN 975 MG: 325 TABLET, FILM COATED ORAL at 00:01

## 2025-05-15 RX ADMIN — ACETAMINOPHEN 975 MG: 325 TABLET, FILM COATED ORAL at 16:50

## 2025-05-15 RX ADMIN — FAMOTIDINE 20 MG: 20 TABLET, FILM COATED ORAL at 08:10

## 2025-05-15 RX ADMIN — PANTOPRAZOLE SODIUM 40 MG: 40 TABLET, DELAYED RELEASE ORAL at 05:14

## 2025-05-15 RX ADMIN — ENOXAPARIN SODIUM 30 MG: 30 INJECTION SUBCUTANEOUS at 08:10

## 2025-05-15 RX ADMIN — LEVOTHYROXINE SODIUM 175 MCG: 175 TABLET ORAL at 05:14

## 2025-05-15 RX ADMIN — ACETAMINOPHEN 975 MG: 325 TABLET, FILM COATED ORAL at 08:10

## 2025-05-15 RX ADMIN — LOSARTAN POTASSIUM 12.5 MG: 25 TABLET, FILM COATED ORAL at 08:10

## 2025-05-15 NOTE — HOSPITAL COURSE
78 y/o M hx HTN, HLD, hypothyroidism who presented 5/12 s/p 2 syncopal falls at home. He was a trauma evaluation on admission and workup revealed a small R posterior parietal subarachnoid hemorrhage. He was initiated on keppra seizure prophylaxis. Neurosurgery recommended no surgical intervention and he should follow-up as needed. CT head on 5/13 showed stable SAH. He developed complete heart block and was started on epinephrine gtt to promote chronotropy and inotropy. A TVP was subsequently placed and he was evaluated by cardiology. He is s/p PPM placement on 5/14 with instructions to follow-up with the device clinic in 2 weeks. He was evaluated by PT/OT and was cleared for discharge. He will continue keppra x 3 more days to complete 7 day course.

## 2025-05-15 NOTE — CASE MANAGEMENT
Case Management Discharge Planning Note    Patient name Leeroy Santos  Location ICU 13/ICU 13 MRN [id number]  : 1945 Date 5/15/2025       Current Admission Date: 2025  Current Admission Diagnosis:SAH (subarachnoid hemorrhage) (HCC)   Patient Active Problem List    Diagnosis Date Noted    Sinus pause 2025    Fall 2025    SAH (subarachnoid hemorrhage) (HCC) 2025    HTN (hypertension) 2020    Hyperlipidemia 2020    Long-segment Grimaldo's esophagus 2020    Family history of colon cancer in father 2020      LOS (days): 2  Geometric Mean LOS (GMLOS) (days):   Days to GMLOS:     OBJECTIVE:  Risk of Unplanned Readmission Score: 9.68         Current admission status: Inpatient   Preferred Pharmacy:   [id number] - [place] [address] [address]  Phone: [phone] Fax: [phone]    Primary Care Provider: Emigdio Mcclain MD    [facility]  Secondary Insurance:     DISCHARGE DETAILS:    Other Referral/Resources/Interventions Provided:  Interventions: Outpatient OT, Outpatient PT  Referral Comments: PT/OT recs for OP. CM provided pt with a list of options through .

## 2025-05-15 NOTE — CASE MANAGEMENT
Case Management Assessment & Discharge Planning Note    Patient name Leeroy Santos  Location ICU 13/ICU 13 MRN [id number]  : 1945 Date 5/15/2025       Current Admission Date: 2025  Current Admission Diagnosis:SAH (subarachnoid hemorrhage) (HCC)   Patient Active Problem List    Diagnosis Date Noted    Sinus pause 2025    Fall 2025    SAH (subarachnoid hemorrhage) (HCC) 2025    HTN (hypertension) 2020    Hyperlipidemia 2020    Long-segment Grimaldo's esophagus 2020    Family history of colon cancer in father 2020      LOS (days): 2  Geometric Mean LOS (GMLOS) (days):   Days to GMLOS:     OBJECTIVE:    Risk of Unplanned Readmission Score: 9.65     Current admission status: Inpatient    Preferred Pharmacy:   [id number] - [place], [place] - [address] [address]  Phone: [phone] Fax: [phone]    Primary Care Provider: Emigdio Mcclain MD    Primary Insurance: [facility]  Secondary Insurance:     ASSESSMENT:  Active Health Care Proxies    There are no active Health Care Proxies on file.       Patient Information  Admitted from:: Home  Mental Status: Alert  During Assessment patient was accompanied by: Not accompanied during assessment  Assessment information provided by:: Patient  Primary Caregiver: Self  Support Systems: Self, Spouse/significant other, Family members  County of Residence: [place]  What city do you live in?: [place]  Home entry access options. Select all that apply.: Stairs  Number of steps to enter home.: 1 (through the front door vs 12-13 through back entrance)  Type of Current Residence: 2 story home  Upon entering residence, is there a bedroom on the main floor (no further steps)?: No  A bedroom is located on the following floor levels of residence (select all that apply):: 2nd Floor  Upon entering residence, is there a bathroom on the main floor (no further steps)?: Yes (1/2  bathroom on first and full on second)  Number of steps to 2nd floor from main floor: One Flight  Living Arrangements: Lives w/ Spouse/significant other, Lives w/ Son    Activities of Daily Living Prior to Admission  Functional Status: Independent  Completes ADLs independently?: Yes  Ambulates independently?: Yes  Does patient use assisted devices?: No  Does patient currently own DME?: No  Does patient have a history of Outpatient Therapy (PT/OT)?: No  Does the patient have a history of Short-Term Rehab?: No  Does patient have a history of HHC?: No    Patient Information Continued  Income Source: Pension/USP  Does patient have prescription coverage?: Yes  Can the patient afford their medications and any related supplies (such as glucometers or test strips)?: Yes  Does patient receive dialysis treatments?: No  Does patient have a history of substance abuse?: No  Does patient have a history of Mental Health Diagnosis?: No    Means of Transportation  Means of Transport to Appts:: Drives Self    DISCHARGE DETAILS:    Discharge planning discussed with:: Pt    Other Referral/Resources/Interventions Provided:  Interventions: Other (Specify)  Referral Comments: CM met with pt at bedside and introduced self/role with dcp. Pt resides with his wife and son. Fully IPTA. Pt aware CM will f/u with any recommendations from the team. PT/OT to see him today vs tomorrow. Pt anticipates no needs. CM to f/u.

## 2025-05-15 NOTE — QUICK NOTE
* s/p MDT PPM implant by Dr. Perez on 2025   * today post device site is soft without any ecchymosis or hematoma    * device was interrogated following procedure and found to be in appropriate function    * CXR postprocedure  demonstrated no pneumothorax.  Pacer lead tips projecting in the region of the right atrium and right ventricle.  Chest x-ray PA and lateral 5/15 pending final imaging results.   * we discussed proper post site care includin. No lifting with left arm above level of left shoulder for 6 weeks while leads mature, no lifting more then 5-10 lbs in this hands during this time    2. Keep Aquacel on for 1 week, as long as Aquacel is stuck down to skin OK to shower as bandage is water proof    * pt will f/u in our device clinic in 2 weeks time to ensure proper device function and proper wound healing, as well as discussion of monitoring device from home, this appointment was placed in [facility].

## 2025-05-15 NOTE — PLAN OF CARE
Problem: PAIN - ADULT  Goal: Verbalizes/displays adequate comfort level or baseline comfort level  Description: Interventions:- Encourage patient to monitor pain and request assistance- Assess pain using appropriate pain scale- Administer analgesics based on type and severity of pain and evaluate response- Implement non-pharmacological measures as appropriate and evaluate response- Consider cultural and social influences on pain and pain management- Notify physician/advanced practitioner if interventions unsuccessful or patient reports new pain  Outcome: Progressing     Problem: INFECTION - ADULT  Goal: Absence or prevention of progression during hospitalization  Description: INTERVENTIONS:- Assess and monitor for signs and symptoms of infection- Monitor lab/diagnostic results- Monitor all insertion sites, i.e. indwelling lines, tubes, and drains- Monitor endotracheal if appropriate and nasal secretions for changes in amount and color- [place] appropriate cooling/warming therapies per order- Administer medications as ordered- Instruct and encourage patient and family to use good hand hygiene technique- Identify and instruct in appropriate isolation precautions for identified infection/condition  Outcome: Progressing  Goal: Absence of fever/infection during neutropenic period  Description: INTERVENTIONS:- Monitor WBC  Outcome: Progressing     Problem: SAFETY ADULT  Goal: Patient will remain free of falls  Description: INTERVENTIONS:- Educate patient/family on patient safety including physical limitations- Instruct patient to call for assistance with activity - Consult OT/PT to assist with strengthening/mobility - Keep Call bell within reach- Keep bed low and locked with side rails adjusted as appropriate- Keep care items and personal belongings within reach- Initiate and maintain comfort rounds- Make Fall Risk Sign visible to staff- Offer Toileting every 2 Hours, in advance of need- Initiate/Maintain bed alarm-  Obtain necessary fall risk management equipment- Apply yellow socks and bracelet for high fall risk patients- Consider moving patient to room near nurses station  Outcome: Progressing  Goal: Maintain or return to baseline ADL function  Description: INTERVENTIONS:-  Assess patient's ability to carry out ADLs; assess patient's baseline for ADL function and identify physical deficits which impact ability to perform ADLs (bathing, care of mouth/teeth, toileting, grooming, dressing, etc.)- Assess/evaluate cause of self-care deficits - Assess range of motion- Assess patient's mobility; develop plan if impaired- Assess patient's need for assistive devices and provide as appropriate- Encourage maximum independence but intervene and supervise when necessary- Involve family in performance of ADLs- Assess for home care needs following discharge - Consider OT consult to assist with ADL evaluation and planning for discharge- Provide patient education as appropriate  Outcome: Progressing  Goal: Maintains/Returns to pre admission functional level  Description: INTERVENTIONS:- Perform AM-PAC 6 Click Basic Mobility/ Daily Activity assessment daily.- Set and communicate daily mobility goal to care team and patient/family/caregiver. - Collaborate with rehabilitation services on mobility goals if consulted- Perform Range of Motion 3 times a day.- Reposition patient every 2 hours.- Dangle patient 3 times a day- Stand patient 3 times a day- Ambulate patient 3 times a day- Out of bed to chair 3 times a day - Out of bed for meals 3 times a day- Out of bed for toileting- Record patient progress and toleration of activity level   Outcome: Progressing     Problem: DISCHARGE PLANNING  Goal: Discharge to home or other facility with appropriate resources  Description: INTERVENTIONS:- Identify barriers to discharge w/patient and caregiver- Arrange for needed discharge resources and transportation as appropriate- Identify discharge learning needs  (meds, wound care, etc.)- Arrange for interpretive services to assist at discharge as needed- Refer to Case Management Department for coordinating discharge planning if the patient needs post-hospital services based on physician/advanced practitioner order or complex needs related to functional status, cognitive ability, or social support system  Outcome: Progressing     Problem: Knowledge Deficit  Goal: Patient/family/caregiver demonstrates understanding of disease process, treatment plan, medications, and discharge instructions  Description: Complete learning assessment and assess knowledge base.Interventions:- Provide teaching at level of understanding- Provide teaching via preferred learning methods  Outcome: Progressing     Problem: Prexisting or High Potential for Compromised Skin Integrity  Goal: Skin integrity is maintained or improved  Description: INTERVENTIONS:  - Identify patients at risk for skin breakdown  - Assess and monitor skin integrity  - Assess and monitor nutrition and hydration status  - Monitor labs   - Assess for incontinence   - Turn and reposition patient  - Assist with mobility/ambulation  - Relieve pressure over bony prominences  - Avoid friction and shearing  - Provide appropriate hygiene as needed including keeping skin clean and dry  - Evaluate need for skin moisturizer/barrier cream  - Collaborate with interdisciplinary team   - Patient/family teaching  - Consider wound care consult   Outcome: Progressing

## 2025-05-15 NOTE — ANESTHESIA POSTPROCEDURE EVALUATION
Post-Op Assessment Note    CV Status:  Stable    Pain management: adequate       Mental Status:  Alert and awake   Hydration Status:  Euvolemic   PONV Controlled:  Controlled   Airway Patency:  Patent  Two or more mitigation strategies used for obstructive sleep apnea   Post Op Vitals Reviewed: Yes    No anethesia notable event occurred.    Staff: Anesthesiologist           Last Filed PACU Vitals:  Vitals Value Taken Time   Temp 97.7 °F (36.5 °C) 05/15/25 08:00   Pulse 66 05/15/25 10:15   /56 05/15/25 10:15   Resp 25 05/15/25 10:15   SpO2 99 % 05/15/25 10:15   Vitals shown include unfiled device data.

## 2025-05-15 NOTE — ASSESSMENT & PLAN NOTE
Frequent episodes of ventricular standstill   5/12 Started epi gtt with improvement   5/13: Bedside TVP  5/14: PPM    Plan:   S/p PPM 5/14, native NSR with backup DDD rate 60  Maintain electrolytes   Cardiology following

## 2025-05-15 NOTE — DISCHARGE SUMMARY
"Discharge Summary - Critical Care/ICU   Name: Leeroy Santos 79 y.o. male I MRN: [id number]  Unit/Bed#: ICU 13 I Date of Admission: 5/12/2025   Date of Service: 5/15/2025 I Hospital Day: 2    Admission Date: 5/12/2025 1227  Discharge Date: 05/15/25  Admitting Diagnosis: Subarachnoid hemorrhage (HCC) [I60.9]  Syncope [R55]  Elevated blood pressure reading [R03.0]  Dizzy [R42]  Abrasion [T14.8XXA]  AMS (altered mental status) [R41.82]  Discharge Diagnosis:   Medical Problems       Resolved Problems  Date Reviewed: 5/15/2025          Resolved    Bradycardia 5/15/2025     Resolved by  Tuan Asencio PA-C          HPI: As per Christi Porter 5/13 \"Leeroy Santos is a 79 y.o. who presents to the hospital s/p 2 syncopal falls at home. PMH only notable for HTN and HLD. Endorsed falling twice at home. He would get a funny, fuzzy feeling like something is being pulled over his brain and then synopsize. Once had + LOC. CT head revealed a small SAH. Overnight patient experienced profound bradycardia to the 30s with frequent episodes that appear to be ventricular standstill. Started Epi gtt with HR improvement to 50s. Not previous on betablockers. Remained hemodynamically stable. Not endorsing any symptoms at time of events\"     Procedures Performed:   Orders Placed This Encounter   Procedures    Cardiac ep lab eps/ablations    POC Cardiac US    Temporary External Pacing       Summary of Hospital Course: 80 y/o M hx HTN, HLD, hypothyroidism who presented 5/12 s/p 2 syncopal falls at home. He was a trauma evaluation on admission and workup revealed a small R posterior parietal subarachnoid hemorrhage. He was initiated on keppra seizure prophylaxis. Neurosurgery recommended no surgical intervention and he should follow-up as needed. CT head on 5/13 showed stable SAH. He developed complete heart block and was started on epinephrine gtt to promote chronotropy and inotropy. A TVP was subsequently placed and he was evaluated " by cardiology. He is s/p PPM placement on 5/14 with instructions to follow-up with the device clinic in 2 weeks. He was evaluated by PT/OT and was cleared for discharge. He will continue keppra x 3 more days to complete 7 day course.      Significant Findings, Care, Treatment and Services Provided: TVP and subsequent PPM placement    Complications: Complete heart block    Condition at Discharge: good       Discharge instructions/Information to patient and family:   See After Visit Summary (AVS) for information provided to patient and family.      Provisions for Follow-Up Care:  See after visit summary for information related to follow-up care and any pertinent home health orders.      PCP: Emigdio Mcclain MD    Disposition: Home    Planned Readmission: No     Discharge Medications:  See after visit summary for reconciled discharge medications provided to patient and family.    Continue keppra 500mg BID x 3 days

## 2025-05-15 NOTE — PLAN OF CARE
Problem: PAIN - ADULT  Goal: Verbalizes/displays adequate comfort level or baseline comfort level  Description: Interventions:- Encourage patient to monitor pain and request assistance- Assess pain using appropriate pain scale- Administer analgesics based on type and severity of pain and evaluate response- Implement non-pharmacological measures as appropriate and evaluate response- Consider cultural and social influences on pain and pain management- Notify physician/advanced practitioner if interventions unsuccessful or patient reports new pain  Outcome: Progressing     Problem: INFECTION - ADULT  Goal: Absence or prevention of progression during hospitalization  Description: INTERVENTIONS:- Assess and monitor for signs and symptoms of infection- Monitor lab/diagnostic results- Monitor all insertion sites, i.e. indwelling lines, tubes, and drains- Monitor endotracheal if appropriate and nasal secretions for changes in amount and color- [place] appropriate cooling/warming therapies per order- Administer medications as ordered- Instruct and encourage patient and family to use good hand hygiene technique- Identify and instruct in appropriate isolation precautions for identified infection/condition  Outcome: Progressing  Goal: Absence of fever/infection during neutropenic period  Description: INTERVENTIONS:- Monitor WBC  Outcome: Progressing     Problem: SAFETY ADULT  Goal: Patient will remain free of falls  Description: INTERVENTIONS:- Educate patient/family on patient safety including physical limitations- Instruct patient to call for assistance with activity - Consult OT/PT to assist with strengthening/mobility - Keep Call bell within reach- Keep bed low and locked with side rails adjusted as appropriate- Keep care items and personal belongings within reach- Initiate and maintain comfort rounds- Make Fall Risk Sign visible to staff  Outcome: Progressing  Goal: Maintain or return to baseline ADL  function  Description: INTERVENTIONS:-  Assess patient's ability to carry out ADLs; assess patient's baseline for ADL function and identify physical deficits which impact ability to perform ADLs (bathing, care of mouth/teeth, toileting, grooming, dressing, etc.)- Assess/evaluate cause of self-care deficits - Assess range of motion- Assess patient's mobility; develop plan if impaired- Assess patient's need for assistive devices and provide as appropriate- Encourage maximum independence but intervene and supervise when necessary- Involve family in performance of ADLs- Assess for home care needs following discharge - Consider OT consult to assist with ADL evaluation and planning for discharge- Provide patient education as appropriate  Outcome: Progressing  Goal: Maintains/Returns to pre admission functional level  Description: INTERVENTIONS:- Perform AM-PAC 6 Click Basic Mobility/ Daily Activity assessment daily.- Set and communicate daily mobility goal to care team and patient/family/caregiver. - Collaborate with rehabilitation services on mobility goals if consulted-     Problem: DISCHARGE PLANNING  Goal: Discharge to home or other facility with appropriate resources  Description: INTERVENTIONS:- Identify barriers to discharge w/patient and caregiver- Arrange for needed discharge resources and transportation as appropriate- Identify discharge learning needs (meds, wound care, etc.)- Arrange for interpretive services to assist at discharge as needed- Refer to Case Management Department for coordinating discharge planning if the patient needs post-hospital services based on physician/advanced practitioner order or complex needs related to functional status, cognitive ability, or social support system  Outcome: Progressing     Problem: Knowledge Deficit  Goal: Patient/family/caregiver demonstrates understanding of disease process, treatment plan, medications, and discharge instructions  Description: Complete learning  assessment and assess knowledge base.Interventions:- Provide teaching at level of understanding- Provide teaching via preferred learning methods  Outcome: Progressing     Problem: Prexisting or High Potential for Compromised Skin Integrity  Goal: Skin integrity is maintained or improved  Description: INTERVENTIONS:  - Identify patients at risk for skin breakdown  - Assess and monitor skin integrity  - Assess and monitor nutrition and hydration status  - Monitor labs   - Assess for incontinence   - Turn and reposition patient  - Assist with mobility/ambulation  - Relieve pressure over bony prominences  - Avoid friction and shearing  - Provide appropriate hygiene as needed including keeping skin clean and dry  - Evaluate need for skin moisturizer/barrier cream  - Collaborate with interdisciplinary team   - Patient/family teaching  - Consider wound care consult   Outcome: Progressing

## 2025-05-15 NOTE — PHYSICAL THERAPY NOTE
PHYSICAL THERAPY Re-EVALUATION NOTE          Patient Name: Leeroy Santos  Today's Date: 5/15/2025          AGE:   79 y.o.  Mrn:   [id number]  ADMIT DX:  Subarachnoid hemorrhage (HCC) [I60.9]  Syncope [R55]  Elevated blood pressure reading [R03.0]  Dizzy [R42]  Abrasion [T14.8XXA]  AMS (altered mental status) [R41.82]    Past Medical History:  Past Medical History:   Diagnosis Date    Grimaldo esophagus     EGD with RFA    Colon polyp     Decreased bone density     Disease of thyroid gland     hyperthyroid-goiter-thyroidectomy    Erectile dysfunction     Full dentures     GERD (gastroesophageal reflux disease)     Hyperlipidemia     Hypertension     Kyphosis     Osteopenia     Osteoporosis     Scoliosis     Seasonal allergies     Spondylosis     Wears glasses        Past Surgical History:  Past Surgical History:   Procedure Laterality Date    CARDIAC ELECTROPHYSIOLOGY PROCEDURE N/A 2025    Procedure: Cardiac pacer implant;  Surgeon: Fer Perez MD;  [facility];  Service: Cardiology    COLONOSCOPY      EGD      RFA x2  and , 3/2022 (RFA not done)    PARATHYROID GLAND SURGERY      2 removed 2 remain    THYROIDECTOMY      total    TONSILLECTOMY      as child     Length Of Stay: 2        PHYSICAL THERAPY Re-EVALUATION:    Patient's identity confirmed via 2 patient identifiers (full name and ) at start of session       05/15/25 1356   PT Last Visit   PT Visit Date 05/15/25   Note Type   Note type Re-Evaluation   Pain Assessment   Pain Assessment Tool 0-10   Pain Score 5   Pain Location/Orientation Orientation: Left;Location: Chest  (at pacemaker insertion)   Hospital Pain Intervention(s) Repositioned;Ambulation/increased activity   Restrictions/Precautions   Weight Bearing Precautions Per Order Yes   LLE Weight Bearing Per Order (S)    (pacemaker precautions)   Braces or Orthoses Sling  (LUE)   Other Precautions Chair Alarm;Bed  "Alarm;Multiple lines;Telemetry;Fall Risk;Pain   Home Living   Type of Home House   Home Layout 1/2 bath on main level;Bed/bath upstairs;Stairs to enter with rails  (1 JAXSON in front of house, 12-13 steps from back (where pt typically eid), flight of stairs to 2nd floor bedroom and full bath. Both sets of stairs have a L sided railing while ascending per pt)   Prior Function   Level of [place] Independent with functional mobility;Independent with ADLs   Lives With Spouse;Son  (son with special needs)   Receives Help From Family   IADLs Independent with driving;Independent with meal prep;Independent with medication management   Falls in the last 6 months 1 to 4   Comments At baseline pt amb ind w/o AD   General   Additional Pertinent History Pt admitted s/p fall, found to have SAH. CT head 25: stable trace SAH along the sulci of the R parietal lobe. Now s/p pacemaker placement 25, sling LUE. \"No lifting with left arm above level of left shoulder for 6 weeks while leads mature, no lifting more then 5-10 lbs in this hands during this time\" per Cardiology   Family/Caregiver Present No   Cognition   Arousal/Participation Cooperative   Attention Within functional limits   Orientation Level Oriented X4   Memory Decreased recall of precautions  (decreased recall of pacemaker precautions)   Following Commands Follows multistep commands with increased time or repetition   Comments Pt ID via name and : pt agreeable to PT and mobility   RLE Assessment   RLE Assessment WFL  (grossly assessed w/ functional mobility, at least 3+/5)   LLE Assessment   LLE Assessment WFL  (grossly assessed w/ functional mobility, at least 3+/5)   Vision-Basic Assessment   Current Vision Wears glasses all the time   Bed Mobility   Additional Comments pt OOB in the recliner chair upon arrival to room, returned to the chair at end of session   Transfers   Sit to Stand 6  Modified independent   Additional items Armrests  (RUE)   Stand " to Sit 6  Modified independent   Additional items Armrests  (RUE)   Additional Comments 2 sit<>stand transfers   Ambulation/Elevation   Gait pattern Wide TOMAS;Decreased foot clearance   Gait Assistance 5  Supervision   Additional items Verbal cues   Assistive Device None   Distance 3'+5'x2   Stair Management Assistance 5  Supervision   Additional items Assist x 1;Verbal cues  (visual demonstration of stair negotiation sideways/at an angle to allow him to use his RUE for support on the railing (L sided railing on ascent up stairs into the house per pt))   Stair Management Technique One rail L;Step to pattern  (RUE on L sided railing)   Number of Stairs 11  (practice step in room)   Balance   Static Sitting Good   Dynamic Sitting Fair +   Static Standing Fair +   Dynamic Standing Fair   Ambulatory Fair   Activity Tolerance   Activity Tolerance Patient tolerated treatment well   Medical Staff Made Aware Pt benefited from PT/OT care coordination w/ OT Lexee for portion of session due to allow for challenge of pt's activity tolerance, PT and OT goals were addressed individually during session   Nurse Made Aware RN Azalia   Assessment   Prognosis Good   Problem List Decreased cognition;Decreased skin integrity;Pain   Assessment Leeroy Santos is a 79 y.o. Male who originally presented to [facility] on 5/12/25 due to syncope and diagnosis of SAH. Orders for PT eval and treat received. Pt seen for PT re-evaluation due to pt s/p pacemaker placement. Comorbidities continuing to affect pt include: HTN, falls. Personal factors affecting pt DC include: lives in 2 story house, stairs to enter home, and positive fall history. At baseline, pt mobilizes independently w/ no AD, and reports + fall(s) in the previous 6 months. Pt currently presents w/ the following deficits: pain affecting mobility and gait deviations. Upon re-eval, pt requires  mod I for transfers, supervision w/ no AD for ambulation, supervision w/ R UE support for stair  negotiation. From a PT/mobility standpoint given the above findings, DC recommendation is level: III (Minimum Rehab Resource Intensity). During current admission, pt will benefit from continued skilled inpatient PT in the acute care setting in order to address the above deficits and to maximize function and mobility prior to DC from acute care.   Goals   STG Expiration Date 05/25/25   Short Term Goal #1 Pt will: perform bed mobility w/ mod I to decrease pt's burden of care and increase pt's independence w/ repositioning in bed; perform transfers w/ mod I to promote OOB mobility; ambulate 150' w/ LRAD and mod I to increase pt's ambulatory endurance/tolerance; negotiate 13 stair(s) w/ RUE support and mod I to facilitate pt returning to previous living environment; increase all balance ratings by at least 1 grade to decrease pt's risk of falls   Plan   Treatment/Interventions LE strengthening/ROM;Elevations;Therapeutic exercise;Endurance training;Patient/family training;Equipment eval/education;Bed mobility;Gait training;Compensatory technique education;Functional transfer training   PT Frequency 1-2x/wk   Discharge Recommendation   Rehab Resource Intensity Level, PT III (Minimum Resource Intensity)   AM-PAC Basic Mobility Inpatient   Turning in Flat Bed Without Bedrails 4   Lying on Back to Sitting on Edge of Flat Bed Without Bedrails 3   Moving Bed to Chair 4   Standing Up From Chair Using Arms 4   Walk in Room 3   Climb 3-5 Stairs With Railing 3   Basic Mobility Inpatient Raw Score 21   Basic Mobility Standardized Score 45.55   [place] Highest Level Of Mobility   -HLM Goal 6: Walk 10 steps or more   -HLM Achieved 7: Walk 25 feet or more   Additional Treatment Session   Start Time 1410   End Time 1422   Treatment Assessment Additional PT intervention provided w/ pt agreeable and motivated to participate. Pt performed an additional sit<>stand transfer to/from the recliner chair w/ mod I (using RUE on the  armrest prn). Pt ambulated an additional 60' w/ supervision progressing to independent w/o AD. Pt demonstrated ability to negotiate multiple obstacles and turns w/o evidence of LOB. Pt confirms understanding stair negotiation technique and denies additional mobility concerns/questions at this time. Pt will continue benefit from PT to promote independence w/ functional mobility and progress towards set goals. Recommend DC w/ level: III (Minimum Rehab Resource Intensity) when medically cleared.   End of Consult   Patient Position at End of Consult Bedside chair  (OT present for continued treatment)       The patient's AM-PAC Basic Mobility Inpatient Short Form Raw Score is 21. A Raw score of greater than 16 suggests the patient may benefit from discharge to home. Please also refer to the recommendation of the Physical Therapist for safe discharge planning.    Pt will benefit from skilled inpatient PT during this admission in order to facilitate progress towards goals and to maximize functional independence prior to DC      DC rec: level III (Minimum Rehab Resource Intensity)        Brittany Fairchild, PT, DPT  05/15/25

## 2025-05-15 NOTE — PLAN OF CARE
Problem: PHYSICAL THERAPY ADULT  Goal: Performs mobility at highest level of function for planned discharge setting.  See evaluation for individualized goals.  Description: Treatment/Interventions: LE strengthening/ROM, Therapeutic exercise, Endurance training, Patient/family training, Equipment eval/education, Bed mobility, Compensatory technique education (PT to see when appropriate for OOB mobility)          See flowsheet documentation for full assessment, interventions and recommendations.  Note: Prognosis: Good  Problem List: Decreased cognition, Decreased skin integrity, Pain  Assessment: Leeroy Santos is a 79 y.o. Male who originally presented to [facility] on 5/12/25 due to syncope and diagnosis of SAH. Orders for PT eval and treat received. Pt seen for PT re-evaluation due to pt s/p pacemaker placement. Comorbidities continuing to affect pt include: HTN, falls. Personal factors affecting pt DC include: lives in 2 story house, stairs to enter home, and positive fall history. At baseline, pt mobilizes independently w/ no AD, and reports + fall(s) in the previous 6 months. Pt currently presents w/ the following deficits: pain affecting mobility and gait deviations. Upon re-eval, pt requires  mod I for transfers, supervision w/ no AD for ambulation, supervision w/ R UE support for stair negotiation. From a PT/mobility standpoint given the above findings, DC recommendation is level: III (Minimum Rehab Resource Intensity). During current admission, pt will benefit from continued skilled inpatient PT in the acute care setting in order to address the above deficits and to maximize function and mobility prior to DC from acute care.  Barriers to Discharge: Inaccessible home environment     Rehab Resource Intensity Level, PT: III (Minimum Resource Intensity)    See flowsheet documentation for full assessment.

## 2025-05-15 NOTE — OCCUPATIONAL THERAPY NOTE
Occupational Therapy Re-Evaluation     Patient Name: Leeroy Santos  Today's Date: 5/15/2025  Problem List  Principal Problem:    SAH (subarachnoid hemorrhage) (HCC)  Active Problems:    HTN (hypertension)    Hyperlipidemia    Sinus pause    Fall    Past Medical History  Past Medical History:   Diagnosis Date    Grimaldo esophagus     EGD with RFA    Colon polyp     Decreased bone density     Disease of thyroid gland     hyperthyroid-goiter-thyroidectomy    Erectile dysfunction     Full dentures     GERD (gastroesophageal reflux disease)     Hyperlipidemia     Hypertension     Kyphosis     Osteopenia     Osteoporosis     Scoliosis     Seasonal allergies     Spondylosis     Wears glasses      Past Surgical History  Past Surgical History:   Procedure Laterality Date    CARDIAC ELECTROPHYSIOLOGY PROCEDURE N/A 5/14/2025    Procedure: Cardiac pacer implant;  Surgeon: Fer Perez MD;  [facility];  Service: Cardiology    COLONOSCOPY      EGD      RFA x2 2020 and 2021, 3/2022 (RFA not done)    PARATHYROID GLAND SURGERY      2 removed 2 remain    THYROIDECTOMY      total    TONSILLECTOMY      as child         05/15/25 1447   OT Last Visit   OT Visit Date 05/15/25   Note Type   Note type Re-Evaluation  (and Treatment)   Pain Assessment   Pain Assessment Tool 0-10   Pain Score 5   Pain Location/Orientation Orientation: Left;Location: Arm   Pain Onset/Description Onset: Ongoing;Descriptor: Aching   Effect of Pain on Daily Activities limits comfort   Patient's Stated Pain Goal No pain   Hospital Pain Intervention(s) Repositioned;Ambulation/increased activity;Emotional support   Restrictions/Precautions   Weight Bearing Precautions Per Order Yes   LLE Weight Bearing Per Order   (pacemaker precautions)   Braces or Orthoses (S)  Sling  (standard sling exchanged to shoulder immobilizer due to poor compliance w/ flexion/abduction restrictions)   Other Precautions Cognitive;Chair Alarm;Bed Alarm;Multiple  "lines;Telemetry;Fall Risk;Pain  (Pacemaker precuations)   Home Living   Additional Comments Please refer to initial OT evaluation performed on 5/13 for home setup.   Prior Function   Comments Please refer to initial OT evaluation performed on 5/13 for PLOF.   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   General   Additional Pertinent History Pt admitted to [facility] s/p fall resulting in subarachnoid hemorrhage along the sulcus of the right parietal lobe. Cardiology consulted, S/p MDT PPM implant by Dr. Perez on 5/14/2025. Pacemaker precautions: No lifting with left arm above level of left shoulder for 6 weeks while leads mature, no lifting more then 5-10 lbs in this hands during this time   Family/Caregiver Present No   Subjective   Subjective \"I'm going to remember everything\"   ADL   Where Assessed Chair   Eating Assistance 7  Independent   Grooming Assistance 6  Modified Independent   UB Bathing Assistance 5  Supervision/Setup   LB Bathing Assistance 5  Supervision/Setup   UB Dressing Assistance 4  Minimal Assistance   UB Dressing Deficit Setup;Pull over head;Pull around back;Thread LUE;Increased time to complete;Supervision/safety;Verbal cueing  (sling management sitting in recliner, VC for step by step instruction, pt required frequent cueing to maintain pacemaker precautions. Verbal and visual demosntration for UB dressing provided, pt verbalized understanding. See additional treatment below.)   LB Dressing Assistance 5  Supervision/Setup   Toileting Assistance  5  Supervision/Setup   Bed Mobility   Additional Comments Pt received sitting in recliner upon arrival, returned at end of session   Transfers   Sit to Stand 6  Modified independent   Additional items " "Armrests;Increased time required   Stand to Sit 6  Modified independent   Additional items Armrests;Increased time required   Additional Comments x 3 total STS performed mod (I) w/ RUE support on armrest   Functional Mobility   Functional Mobility 7  Independent   Additional Comments Household distance around room w/ PT Brittany, present for line management only. Denies lightheadedness   Additional items   (no AD)   Balance   Static Sitting Good   Dynamic Sitting Fair +   Static Standing Fair +   Dynamic Standing Fair   Activity Tolerance   Activity Tolerance Patient limited by pain   Medical Staff Made Aware Care coordinated w/ PT Brittany, Spoke to MARIBEL Akers updated   Nurse Made Aware yes, BANDAR reyes to see pt and updated   RUE Assessment   RUE Assessment WFL   LUE Assessment   LUE Assessment X  (pacemaker precautions: \"No lifting with left arm above level of left shoulder for 6 weeks while leads mature, no lifting more then 5-10 lbs in this hands during this time\")   Hand Function   Gross Motor Coordination Functional   Fine Motor Coordination Functional   Hand Function Comments R handed   Sensation   Light Touch No apparent deficits   Vision-Basic Assessment   Current Vision Wears glasses all the time   Cognition   Overall Cognitive Status WFL  (grossly WFL in conversation, questionable higher level cognition)   Arousal/Participation Alert;Cooperative   Attention Within functional limits   Orientation Level Oriented X4   Memory Decreased recall of precautions  (decreased recall/application of pacemaker precautions)   Following Commands Follows multistep commands with increased time or repetition   Comments Pleasant and agreeable. Requires (+) time for processing/comprehension of edu on pacemaker precautions and modification of ADLs. Pt initially required frequent physical A to correct, improving w/ repetition and re-edu   Assessment   Limitation Decreased ADL status;Decreased Safe judgement during " ADL;Decreased self-care trans;Decreased high-level ADLs;Non-func L UE  (impaired pain and act lisa, sequencing, problem solving, and learning new tasks, response time)   Prognosis Good   Assessment Pt is a 79 y.o. male admitted to [facility] on 5/12/2025 due to fall resulting in SAH. Pt seen on this date for OT Re-Evaluation due to placement of pacemaker w/ new pacekmaker precautions and activity restrictions. Please refer to H&P/ initial OT eval (5/13) for detailed PMH and social history. At baseline pt is fully (I), no AD, (+) falls, (+) . Upon re-eval pt performed as is listed above, demonstrating improved ability to participate in functional ADL transfers mobility and sitting/standing tolerance and OOB activity, regression in performance of ADLs due to pain and new pacemaker restrictions. See additional treatment for UB/LB ADL modification and performance. Cotinue to recommend level III services upon DC. Pt would benefit from continued skilled OT treatment in order to maximize safety, independence and overall performance with ADLs, functional transfers, functional mobility and cognition in order to achieve highest level of function. Updated goals are listed below   Goals   Patient Goals to get home and be independent   LTG Time Frame 10-14   Long Term Goal #1 see goals below   Plan   Treatment Interventions ADL retraining;Functional transfer training;Endurance training;Cognitive reorientation;Patient/family training;Equipment evaluation/education;Compensatory technique education   Goal Expiration Date 05/25/25   OT Treatment Day 1   OT Frequency 2-3x/wk   Discharge Recommendation   Rehab Resource Intensity Level, OT III (Minimum Resource Intensity)  (increased social support for IADLs/ADLs PRN)   Equipment Recommended Shower/Tub chair with back ($)  (pt reports he will have daughter purchase (who is an OT))   AM-PAC Daily Activity Inpatient   Lower Body Dressing 3   Bathing 3   Toileting 3   Upper Body Dressing 3    Grooming 4   Eating 4   Daily Activity Raw Score 20   Daily Activity Standardized Score (Calc for Raw Score >=11) 42.03   AM-PAC Applied Cognition Inpatient   Following a Speech/Presentation 3   Understanding Ordinary Conversation 4   Taking Medications 3   Remembering Where Things Are Placed or Put Away 4   Remembering List of 4-5 Errands 3   Taking Care of Complicated Tasks 3   Applied Cognition Raw Score 20   Applied Cognition Standardized Score 41.76   Additional Treatment Session   Start Time 1422   End Time 1447   Treatment Assessment Pt seen for additional treatment session to continue edu for sling management, performance of UB/LB ADLs and equipment needs for safe DC. Pt received in recliner. Pt able to doff shoulder immobilzer w/ (+) time and min VC for sequencing. Able to doff gown w/ S, intermittent physical A to maintain pacemaker precautions. Able to don T-shirt w/ S and min VC for technique, improved maintenance of pacemaker precautions. Able to don shoulder immobilizer w/ overall min A, A to identify neck vs abdominal sling for correct adjustment. Pt continues to require (+) time to complete tasks w/ focus on maintaining precautions. Pt able to doff pants w/ S, don personal jeans w/ S in sitting in recliner. Able to manage zipper and belt in stance w/ S. Edu on purchase of shower chair for safety w/ bathing. Pt demonstrated improved technique w/ practice and improved carryover/application of pacemaker precautions w/ repetition of task. Pt continues to require (+) time for safety/adherence to precautions. Continue to recommend level III resources upon DC, increased social support from spouse for ADL/IADLs PRN and f/u w/ OPOT once cleared for ROM.   Additional Treatment Day 1   End of Consult   Education Provided Yes  (pacemaker precautions, activity modification, compensatory techniques)   Patient Position at End of Consult Bedside chair;Bed/Chair alarm activated;All needs within reach   Nurse  Communication Nurse aware of consult     GOALS:    *Goals established to promote patient goal of to go home and be independent:      *Patient will perform grooming tasks standing at sink with (I) in order to increase overall independence     *UB ADL with mod (I) w/ good adherence to pacemaker precautions for inc'd independence with self care and decreased caregiver burden    *LB ADL with (I) w/ good adherence to pacemaker precautions for inc'd independence with self care and decreased caregiver burden    *Toileting with (I) for clothing management and hygiene to increase hygiene/thoroughness in order to reduce caregiver burdens    *Pt will verbalize and demonstrate understanding of pacemaker precautions 100% in tx sessions for increased safety during ADLs/purposeful tasks    *Pt vs caregiver will verbalize and demonstrate good understanding/technique for donning/doffing shoulder immobilizer to increase safety and compliance w/ pacemaker precautions    The patient's raw score on the -PAC Daily Activity Inpatient Short Form is 20. A raw score of greater than or equal to 19 suggests the patient may benefit from discharge to home. Please also refer to the recommendation of the Occupational Therapist for safe discharge planning.      CHANA Herrera, OTR/L    [place] License [id number]  [place] License [id number]

## 2025-05-15 NOTE — PROGRESS NOTES
General Cardiology   Progress Note -  Team One   Leeroy Santos 79 y.o. male MRN: [id number]    Unit/Bed#: ICU 13 Encounter: [id number]    Assessment  1. Recurrent syncope w/ resultant traumatic fall.  2. High grade AVB w/ventricular standstill.   ECG on admission demonstrated sinus bradycardia, HR 41 bpm, bifascicular block.  Telemetry review 5/12-5/13, currently in normal sinus rhythm with heart rates in the 70s to 80s.  Having intermittent episodes of CHB with ventricular standstill up to 8 seconds.   S/p TVP placement on 5/13.  S/p MDT DC PPM implant on 5/14 - HIS/left bundle pacing with deep septal lead to preserve narrow QRS.  Telemetry review 5/14-5/15-NSR.  No arrhythmias identified.  Not previously or currently on BB/AV juan ramon blocking agents.  Electrolytes stable on BMP  TSH 9.2, free T4 0.97  3. Preserved biventricular systolic function.  4. Mild aortic valve stenosis.  Appears stable/compensated.  TTE 5/13/2025; EF 60%, grade 1 DD, LA mildly dilated, mild AS.  5. SAH  Neurosurgery team following.  Nonsurgical management at this time.  Not previously currently on AP/AC  6. Hypertension  Average /65 last recorded 138/67.  Outpatient BP regimen's; losartan 12.5 mg daily  Inpatient BP regimen; losartan 12.5 mg daily.  7. Dyslipidemia  No recent lipid profile for review.  On atorvastatin 20 mg daily.     Plan  Pt denies any specific cardiac or respiratory complaints at this time.  No current supplemental O2 requirements with stable O2 saturations.  Euvolemic on exam  BP stable last recorded at 138/67.  Telemetry reviewed maintaining NSR.  No significant arrhythmias identified  Continue low-dose losartan.  Follow-up chest x-ray PA lateral from this a.m., if unremarkable, anticipate clearance for DC today from a cardiac perspective.  Follow-up has been arranged with [facility] in 2 weeks.    Subjective  Review of Systems   Constitutional: Negative for chills, fever and malaise/fatigue.   Eyes:   "Negative for visual disturbance.   Cardiovascular:  Negative for chest pain, dyspnea on exertion, leg swelling, orthopnea and palpitations.   Respiratory:  Negative for cough and shortness of breath.    Gastrointestinal:  Negative for abdominal pain.   Neurological:  Negative for dizziness, headaches and light-headedness.       Objective:   Physical Exam  Vitals and nursing note reviewed.   Constitutional:       General: He is not in acute distress.     Appearance: He is not diaphoretic.   HENT:      Head: Normocephalic and atraumatic.     Eyes:      General: No scleral icterus.      Cardiovascular:      Rate and Rhythm: Normal rate and regular rhythm.      Pulses: Normal pulses.      Heart sounds: Normal heart sounds.   Pulmonary:      Effort: Pulmonary effort is normal.      Breath sounds: Normal breath sounds.   Abdominal:      Palpations: Abdomen is soft.     Musculoskeletal:      Right lower leg: No edema.      Left lower leg: No edema.     Skin:     General: Skin is warm and dry.     Neurological:      General: No focal deficit present.      Mental Status: He is alert and oriented to person, place, and time.     Psychiatric:         Mood and Affect: Mood normal.         Vitals: Blood pressure 138/67, pulse 63, temperature 97.7 °F (36.5 °C), temperature source Oral, resp. rate (!) 28, height 5' 8\" (1.727 m), weight 74.2 kg (163 lb 9.3 oz), SpO2 99%.,     Body mass index is 24.87 kg/m².,   Systolic (24hrs), Av , Min:110 , Max:169     Diastolic (24hrs), Av, Min:55, Max:83      Intake/Output Summary (Last 24 hours) at 5/15/2025 0911  Last data filed at 5/15/2025 0516  Gross per 24 hour   Intake 1180 ml   Output 800 ml   Net 380 ml     Weight (last 2 days)       Date/Time Weight    05/15/25 0318 74.2 (163.58)    25 0322 70.8 (156.09)    25 0710 76.3 (168.21)    25 0234 76.3 (168.21)            LABORATORY RESULTS  Results from last 7 days   Lab Units 25  1338   CK TOTAL U/L 131 " "    CBC with diff:   Results from last 7 days   Lab Units 05/15/25  0511 05/14/25 0504 05/13/25 0425 05/12/25  1338   WBC Thousand/uL 9.87 9.60 15.09* 7.20   HEMOGLOBIN g/dL 14.0 13.8 12.0 12.6   HEMATOCRIT % 42.5 41.6 36.0* 38.5   MCV fL 95 94 94 94   PLATELETS Thousands/uL 278 293 356 325   RBC Million/uL 4.48 4.45 3.84* 4.10   MCH pg 31.3 31.0 31.3 30.7   MCHC g/dL 32.9 33.2 33.3 32.7   RDW % 13.9 14.0 13.9 13.8   MPV fL 8.9 8.9 9.1 8.8*   NRBC AUTO /100 WBCs  --   --  0 0       CMP:  Results from last 7 days   Lab Units 05/15/25  0511 05/14/25  0504 05/13/25  0425 05/12/25  1338   POTASSIUM mmol/L 4.7 4.2 4.0 4.4   CHLORIDE mmol/L 106 111* 108 109*   CO2 mmol/L 28 24 24 27   BUN mg/dL 23 19 22 25   CREATININE mg/dL 1.02 0.81 0.94 0.87   CALCIUM mg/dL 9.0 9.0 8.8 9.3   AST U/L  --   --  12* 13   ALT U/L  --   --  10 10   ALK PHOS U/L  --   --  46 49   EGFR ml/min/1.73sq m 70 85 77 82       BMP:  Results from last 7 days   Lab Units 05/15/25  0511 05/14/25  0504 05/13/25  0425 05/12/25  1338   POTASSIUM mmol/L 4.7 4.2 4.0 4.4   CHLORIDE mmol/L 106 111* 108 109*   CO2 mmol/L 28 24 24 27   BUN mg/dL 23 19 22 25   CREATININE mg/dL 1.02 0.81 0.94 0.87   CALCIUM mg/dL 9.0 9.0 8.8 9.3       No results found for: \"NTBNP\"     Results from last 7 days   Lab Units 05/14/25 0504 05/13/25 0425   MAGNESIUM mg/dL 2.2 2.1             Results from last 7 days   Lab Units 05/12/25  1724   TSH 3RD GENERATON uIU/mL 9.230*   FREE T4 ng/dL 0.97             Lipid Profile:   No results found for: \"CHOL\"  No results found for: \"HDL\"  No results found for: \"LDLCALC\"  No results found for: \"TRIG\"    Cardiac testing:   No results found for this or any previous visit.    No results found for this or any previous visit.    No results found for this or any previous visit.    No valid procedures specified.  No results found for this or any previous visit.      Meds/Allergies   all current active meds have been reviewed and current meds: "   Current Facility-Administered Medications:     acetaminophen (TYLENOL) tablet 975 mg, Q8H MARLA    atorvastatin (LIPITOR) tablet 20 mg, HS    enoxaparin (LOVENOX) subcutaneous injection 30 mg, Q12H MARLA    famotidine (PEPCID) tablet 20 mg, Daily    HYDROmorphone HCl (DILAUDID) injection 0.2 mg, Q4H PRN    levETIRAcetam (KEPPRA) tablet 500 mg, Q12H MARLA    levothyroxine tablet 175 mcg, Early Morning    losartan (COZAAR) tablet 12.5 mg, QAM    ondansetron (ZOFRAN) injection 4 mg, Q8H PRN    oxyCODONE (ROXICODONE) split tablet 2.5 mg, Q4H PRN **OR** oxyCODONE (ROXICODONE) IR tablet 5 mg, Q4H PRN    pantoprazole (PROTONIX) EC tablet 40 mg, Early Morning           Counseling / Coordination of Care  Total floor / unit time spent today 20 minutes.  Greater than 50% of total time was spent with the patient and / or family counseling and / or coordination of care.      ** Please Note: Dragon 360 Dictation voice to text software may have been used in the creation of this document. **

## 2025-05-15 NOTE — NURSING NOTE
Patient discharged, AVS reviewed with patient. ALL questions answered at this time. Patient walked out to car by foot, accompanied by son. All IV's removed.

## 2025-05-15 NOTE — PLAN OF CARE
Problem: OCCUPATIONAL THERAPY ADULT  Goal: Performs self-care activities at highest level of function for planned discharge setting.  See evaluation for individualized goals.  Description: Treatment Interventions: ADL retraining, Functional transfer training, Endurance training, Patient/family training, Equipment evaluation/education, Compensatory technique education, Continued evaluation, Energy conservation, Activityengagement          See flowsheet documentation for full assessment, interventions and recommendations.   Outcome: Progressing  Note: Limitation: Decreased ADL status, Decreased Safe judgement during ADL, Decreased self-care trans, Decreased high-level ADLs, Non-func L UE (impaired pain and act lisa, sequencing, problem solving, and learning new tasks, response time)  Prognosis: Good  Assessment: Pt is a 79 y.o. male admitted to [facility] on 5/12/2025 due to fall resulting in SAH. Pt seen on this date for OT Re-Evaluation due to placement of pacemaker w/ new pacekmaker precautions and activity restrictions. Please refer to H&P/ initial OT eval (5/13) for detailed PMH and social history. At baseline pt is fully (I), no AD, (+) falls, (+) . Upon re-eval pt performed as is listed above, demonstrating improved ability to participate in functional ADL transfers mobility and sitting/standing tolerance and OOB activity, regression in performance of ADLs due to pain and new pacemaker restrictions. See additional treatment for UB/LB ADL modification and performance. Cotinue to recommend level III services upon DC. Pt would benefit from continued skilled OT treatment in order to maximize safety, independence and overall performance with ADLs, functional transfers, functional mobility and cognition in order to achieve highest level of function. Updated goals are listed below     Rehab Resource Intensity Level, OT: III (Minimum Resource Intensity) (increased social support for IADLs/ADLs PRN)     Destiny  CHANA Garcia, OTR/L  [place] License [id number]  [place] License [id number]

## 2025-05-15 NOTE — PROGRESS NOTES
Progress Note - Critical Care/ICU   Name: Leeroy Santos 79 y.o. male I MRN: [id number]  Unit/Bed#: ICU 13 I Date of Admission: 5/12/2025   Date of Service: 5/15/2025 I Hospital Day: 2      Assessment & Plan  SAH (subarachnoid hemorrhage) (HCC)  CT Head: Serpiginous asymmetric hyperattenuation in the posterior right parietal lobe may reflect a small focus of subarachnoid hemorrhage. No intraparenchymal hemorrhage or significant mass effect.   S/P syncopal falls at home.   Delayed presentation to hospital given unsure policy with [facility] insurance     Plan:  Q4 hour neuro checks   STAT CT scan for change in GCS > 2   Syncopal workup revealed episodes of ventricular standstill   Seizure precautions   Monitor telemetry   Started Lovenox  HTN (hypertension)  Continue losartan   Hyperlipidemia  Continue statin  Sinus pause  Frequent episodes of ventricular standstill   5/12 Started epi gtt with improvement   5/13: Bedside TVP  5/14: PPM    Plan:   S/p PPM 5/14, native NSR with backup DDD rate 60  Maintain electrolytes   Cardiology following  Bradycardia (Resolved: 5/15/2025)  See plan under sinus pause  Fall  Syncopal fall at home. Feels his head get fuzzy and then falls. Once had brief LOC.   Delayed presentation  Injury: small SAH right parietal lobe.   Cardiac in origin in setting of bradycardia with ventricular standstill.     Plan:   PT/OT when able  Fall Pecautions   Disposition: Stepdown Level 2    ICU Core Measures     A: Assess, Prevent, and Manage Pain Has pain been assessed? Yes  Need for changes to pain regimen? No   B: Both SAT/SAT  N/A   C: Choice of Sedation RASS Goal: 0 Alert and Calm  Need for changes to sedation or analgesia regimen? No   D: Delirium CAM-ICU: Negative   E: Early Mobility  Plan for early mobility? Yes   F: Family Engagement Plan for family engagement today? Yes         Prophylaxis:  VTE VTE covered by:  enoxaparin, Subcutaneous, 30 mg at 05/14/25 2040       Stress Ulcer  covered byfamotidine  (PEPCID) 20 mg tablet [[id number]] (Long-Term Med), famotidine (PEPCID) tablet 20 mg [[id number]], omeprazole (PriLOSEC) 20 mg delayed release capsule [[id number]] (Long-Term Med), pantoprazole (PROTONIX) EC tablet 40 mg [[id number]]         24 Hour Events : S/p PPM 5/14, no acute events overnight. Complaining of suboccipital headache this morning.     Subjective   Review of Systems: Review of Systems   Constitutional: Negative.    Respiratory: Negative.     Cardiovascular: Negative.    Gastrointestinal: Negative.    Musculoskeletal:  Positive for arthralgias.   Neurological:  Positive for headaches.   Psychiatric/Behavioral: Negative.         Objective :                   Vitals I/O      Most Recent Min/Max in 24hrs   Temp 97.7 °F (36.5 °C) Temp  Min: 97.5 °F (36.4 °C)  Max: 98.1 °F (36.7 °C)   Pulse 63 Pulse  Min: 60  Max: 82   Resp (!) 28 Resp  Min: 12  Max: 28   /67 BP  Min: 110/55  Max: 169/77   O2 Sat 99 % SpO2  Min: 86 %  Max: 100 %      Intake/Output Summary (Last 24 hours) at 5/15/2025 0733  Last data filed at 5/15/2025 0516  Gross per 24 hour   Intake 1180 ml   Output 1000 ml   Net 180 ml       Diet Regular; Regular House    Invasive Monitoring           Physical Exam   Physical Exam  Eyes:      Pupils: Pupils are equal, round, and reactive to light.   Skin:     General: Skin is warm and dry.   HENT:      Head: Normocephalic and atraumatic.   Cardiovascular:      Rate and Rhythm: Normal rate.      Comments: Intermittently paced at backup rate 60   Musculoskeletal:      Right lower leg: No edema.      Left lower leg: No edema.   Abdominal:      Palpations: Abdomen is soft.      Tenderness: There is no abdominal tenderness.   Constitutional:       Appearance: He is well-developed and well-nourished.   Pulmonary:      Effort: Pulmonary effort is normal.      Breath sounds: Normal breath sounds.   Neurological:      General: No focal deficit present.      Mental Status: He is alert and oriented to person,  place and time. Mental status is at baseline. He is calm.      Motor: gross motor function is at baseline for patient. Strength full and intact in all extremities.          Diagnostic Studies        Lab Results: I have reviewed the following results:     Medications:  Scheduled PRN   acetaminophen, 975 mg, Q8H MARLA  atorvastatin, 20 mg, HS  enoxaparin, 30 mg, Q12H MARLA  famotidine, 20 mg, Daily  levETIRAcetam, 500 mg, Q12H MARLA  levothyroxine, 175 mcg, Early Morning  losartan, 12.5 mg, QAM  pantoprazole, 40 mg, Early Morning      HYDROmorphone, 0.2 mg, Q4H PRN  ondansetron, 4 mg, Q8H PRN  oxyCODONE, 2.5 mg, Q4H PRN   Or  oxyCODONE, 5 mg, Q4H PRN       Continuous          Labs:   CBC    Recent Labs     05/14/25  0504 05/15/25  0511   WBC 9.60 9.87   HGB 13.8 14.0   HCT 41.6 42.5    278     BMP    Recent Labs     05/14/25  0504 05/15/25  0511   SODIUM 140 139   K 4.2 4.7   * 106   CO2 24 28   AGAP 5 5   BUN 19 23   CREATININE 0.81 1.02   CALCIUM 9.0 9.0       Coags    No recent results     Additional Electrolytes  Recent Labs     05/14/25  0504   MG 2.2   PHOS 3.1   CAIONIZED 1.19          Blood Gas    No recent results  No recent results LFTs  No recent results    Infectious  No recent results  Glucose  Recent Labs     05/14/25  0504 05/15/25  0511   GLUC 95 90

## 2025-05-15 NOTE — ASSESSMENT & PLAN NOTE
CT Head: Serpiginous asymmetric hyperattenuation in the posterior right parietal lobe may reflect a small focus of subarachnoid hemorrhage. No intraparenchymal hemorrhage or significant mass effect.   S/P syncopal falls at home.   Delayed presentation to hospital given unsure policy with [facility] insurance     Plan:  Q4 hour neuro checks   STAT CT scan for change in GCS > 2   Syncopal workup revealed episodes of ventricular standstill   Seizure precautions   Monitor telemetry   Started Lovenox

## 2025-05-16 ENCOUNTER — TELEPHONE (OUTPATIENT)
Dept: CARDIOLOGY CLINIC | Facility: CLINIC | Age: 80
End: 2025-05-16

## 2025-05-16 NOTE — TELEPHONE ENCOUNTER
Received a call from pt stating that he got discharged yesterday from the hospital post his pacer procedure, I does not have any questions about his pacer site but he wants to know what to do with the gauze that they left on the right side of his neck since he does not recall any instructions about that.     Patient is unsure how related is that site to his pacemaker that's why he wants to make sure he touches that gauze.

## 2025-05-27 ENCOUNTER — TELEPHONE (OUTPATIENT)
Age: 80
End: 2025-05-27

## 2025-05-27 NOTE — TELEPHONE ENCOUNTER
PT CALLED WITH QUESTIONS REGARDING HFU'S THAT WERE SCHEDULED AFTER INPT STAY 5/12/2025. ADVISED PT THAT AT THIS TIME [facility] WAS NOT LOOKING TO SCHEDULE ANY ADDITIONAL SCANS OR FOLLOW UP APPTS.    PT WAS APPRECIATIVE FOR THE CLARIFICATION AND ASKED TO BE TRANSFERRED TO CARDIO TO REVIEW POV 6/2/2025.    SUCCESSFULLY WARM TRANSFERRED PT TO CARDIO POD TO DISCUSS FURTHER.    PT WAS APPRECIATIVE.

## 2025-06-03 ENCOUNTER — IN-CLINIC DEVICE VISIT (OUTPATIENT)
Dept: CARDIOLOGY CLINIC | Facility: CLINIC | Age: 80
End: 2025-06-03

## 2025-06-03 ENCOUNTER — RESULTS FOLLOW-UP (OUTPATIENT)
Dept: CARDIOLOGY CLINIC | Facility: CLINIC | Age: 80
End: 2025-06-03

## 2025-06-03 DIAGNOSIS — I45.5 SINUS PAUSE: Primary | ICD-10-CM

## 2025-06-03 DIAGNOSIS — Z95.0 PRESENCE OF CARDIAC PACEMAKER: ICD-10-CM

## 2025-06-03 PROCEDURE — 99024 POSTOP FOLLOW-UP VISIT: CPT | Performed by: INTERNAL MEDICINE

## 2025-06-03 NOTE — PROGRESS NOTES
Results for orders placed or performed in visit on 06/03/25   Cardiac EP device report    Narrative    MDT DUAL PM/ ACTIVE SYSTEM IS MRI CONDITIONAL  [facility].  WOUND CHECK: INCISION CLEAN AND DRY WITH EDGES APPROXIMATED; WOUND CARE AND RESTRICTIONS REVIEWED WITH PATIENT.  (SEE PIC IN EPIC-MEDIA).  BATTERY VOLTAGE ADEQUATE (12.3 YRS).  AP 19.9%   62.9%.  ALL LEAD PARAMETERS WITHIN NORMAL LIMITS, EVEN THOUGH ATRIAL UNIPOLAR LEAD IMPEDANCE WARNING ALERT ON 5/26/2025.  NO SIGNIFICANT HIGH RATE EPISODES.  NO PROGRAMMING CHANGES MADE TO DEVICE PARAMETERS.  NORMAL DEVICE FUNCTION.  PAS

## 2025-06-30 ENCOUNTER — HOSPITAL ENCOUNTER (OUTPATIENT)
Facility: HOSPITAL | Age: 80
Setting detail: OBSERVATION
Discharge: HOME | End: 2025-07-01
Attending: EMERGENCY MEDICINE | Admitting: HOSPITALIST
Payer: COMMERCIAL

## 2025-06-30 ENCOUNTER — APPOINTMENT (EMERGENCY)
Dept: RADIOLOGY | Facility: HOSPITAL | Age: 80
End: 2025-06-30
Payer: COMMERCIAL

## 2025-06-30 DIAGNOSIS — K56.609 COLONIC OBSTRUCTION (HCC): ICD-10-CM

## 2025-06-30 DIAGNOSIS — R07.9 CHEST PAIN, UNSPECIFIED: Primary | ICD-10-CM

## 2025-06-30 PROBLEM — Z95.0 PACEMAKER: Status: ACTIVE | Noted: 2025-06-30

## 2025-06-30 LAB
2HR DELTA HS TROPONIN: 1 NG/L
4HR DELTA HS TROPONIN: 1 NG/L
ALBUMIN SERPL BCG-MCNC: 3.9 G/DL (ref 3.5–5)
ALP SERPL-CCNC: 41 U/L (ref 34–104)
ALT SERPL W P-5'-P-CCNC: 11 U/L (ref 7–52)
ANION GAP SERPL CALCULATED.3IONS-SCNC: 4 MMOL/L (ref 4–13)
ANION GAP SERPL CALCULATED.3IONS-SCNC: 5 MMOL/L (ref 4–13)
AST SERPL W P-5'-P-CCNC: 15 U/L (ref 13–39)
ATRIAL RATE: 60 BPM
BASOPHILS # BLD AUTO: 0.06 THOUSANDS/ÂΜL (ref 0–0.1)
BASOPHILS NFR BLD AUTO: 1 % (ref 0–1)
BILIRUB SERPL-MCNC: 0.61 MG/DL (ref 0.2–1)
BUN SERPL-MCNC: 17 MG/DL (ref 5–25)
BUN SERPL-MCNC: 19 MG/DL (ref 5–25)
CALCIUM SERPL-MCNC: 9 MG/DL (ref 8.4–10.2)
CALCIUM SERPL-MCNC: 9.1 MG/DL (ref 8.4–10.2)
CARDIAC TROPONIN I PNL SERPL HS: 5 NG/L (ref ?–50)
CARDIAC TROPONIN I PNL SERPL HS: 6 NG/L (ref 8–18)
CARDIAC TROPONIN I PNL SERPL HS: 6 NG/L (ref ?–50)
CARDIAC TROPONIN I PNL SERPL HS: 6 NG/L (ref ?–50)
CHLORIDE SERPL-SCNC: 107 MMOL/L (ref 96–108)
CHLORIDE SERPL-SCNC: 108 MMOL/L (ref 96–108)
CO2 SERPL-SCNC: 26 MMOL/L (ref 21–32)
CO2 SERPL-SCNC: 28 MMOL/L (ref 21–32)
CREAT SERPL-MCNC: 0.84 MG/DL (ref 0.6–1.3)
CREAT SERPL-MCNC: 0.91 MG/DL (ref 0.6–1.3)
D DIMER PPP FEU-MCNC: 1.31 UG/ML FEU
EOSINOPHIL # BLD AUTO: 0.12 THOUSAND/ÂΜL (ref 0–0.61)
EOSINOPHIL NFR BLD AUTO: 1 % (ref 0–6)
ERYTHROCYTE [DISTWIDTH] IN BLOOD BY AUTOMATED COUNT: 13.8 % (ref 11.6–15.1)
GFR SERPL CREATININE-BSD FRML MDRD: 79 ML/MIN/1.73SQ M
GFR SERPL CREATININE-BSD FRML MDRD: 83 ML/MIN/1.73SQ M
GLUCOSE SERPL-MCNC: 125 MG/DL (ref 65–140)
GLUCOSE SERPL-MCNC: 94 MG/DL (ref 65–140)
HCT VFR BLD AUTO: 39.6 % (ref 36.5–49.3)
HGB BLD-MCNC: 12.8 G/DL (ref 12–17)
IMM GRANULOCYTES # BLD AUTO: 0.03 THOUSAND/UL (ref 0–0.2)
IMM GRANULOCYTES NFR BLD AUTO: 0 % (ref 0–2)
LYMPHOCYTES # BLD AUTO: 2.24 THOUSANDS/ÂΜL (ref 0.6–4.47)
LYMPHOCYTES NFR BLD AUTO: 19 % (ref 14–44)
MAGNESIUM SERPL-MCNC: 1.9 MG/DL (ref 1.9–2.7)
MCH RBC QN AUTO: 31.8 PG (ref 26.8–34.3)
MCHC RBC AUTO-ENTMCNC: 32.3 G/DL (ref 31.4–37.4)
MCV RBC AUTO: 99 FL (ref 82–98)
MONOCYTES # BLD AUTO: 1.21 THOUSAND/ÂΜL (ref 0.17–1.22)
MONOCYTES NFR BLD AUTO: 10 % (ref 4–12)
NEUTROPHILS # BLD AUTO: 8 THOUSANDS/ÂΜL (ref 1.85–7.62)
NEUTS SEG NFR BLD AUTO: 69 % (ref 43–75)
NRBC BLD AUTO-RTO: 0 /100 WBCS
P AXIS: 51 DEGREES
PLATELET # BLD AUTO: 299 THOUSANDS/UL (ref 149–390)
PMV BLD AUTO: 8.8 FL (ref 8.9–12.7)
POTASSIUM SERPL-SCNC: 4 MMOL/L (ref 3.5–5.3)
POTASSIUM SERPL-SCNC: 4.4 MMOL/L (ref 3.5–5.3)
PR INTERVAL: 176 MS
PROT SERPL-MCNC: 6.3 G/DL (ref 6.4–8.4)
QRS AXIS: -78 DEGREES
QRSD INTERVAL: 148 MS
QT INTERVAL: 462 MS
QTC INTERVAL: 462 MS
RBC # BLD AUTO: 4.02 MILLION/UL (ref 3.88–5.62)
SODIUM SERPL-SCNC: 138 MMOL/L (ref 135–147)
SODIUM SERPL-SCNC: 140 MMOL/L (ref 135–147)
T WAVE AXIS: 40 DEGREES
VENTRICULAR RATE: 60 BPM
WBC # BLD AUTO: 11.66 THOUSAND/UL (ref 4.31–10.16)

## 2025-06-30 PROCEDURE — 36415 COLL VENOUS BLD VENIPUNCTURE: CPT

## 2025-06-30 PROCEDURE — 83735 ASSAY OF MAGNESIUM: CPT | Performed by: PHYSICIAN ASSISTANT

## 2025-06-30 PROCEDURE — 99214 OFFICE O/P EST MOD 30 MIN: CPT | Performed by: INTERNAL MEDICINE

## 2025-06-30 PROCEDURE — 84484 ASSAY OF TROPONIN QUANT: CPT

## 2025-06-30 PROCEDURE — 99223 1ST HOSP IP/OBS HIGH 75: CPT | Performed by: INTERNAL MEDICINE

## 2025-06-30 PROCEDURE — 85025 COMPLETE CBC W/AUTO DIFF WBC: CPT

## 2025-06-30 PROCEDURE — 84484 ASSAY OF TROPONIN QUANT: CPT | Performed by: NURSE PRACTITIONER

## 2025-06-30 PROCEDURE — 80048 BASIC METABOLIC PNL TOTAL CA: CPT | Performed by: PHYSICIAN ASSISTANT

## 2025-06-30 PROCEDURE — 80053 COMPREHEN METABOLIC PANEL: CPT

## 2025-06-30 PROCEDURE — 93010 ELECTROCARDIOGRAM REPORT: CPT | Performed by: INTERNAL MEDICINE

## 2025-06-30 PROCEDURE — 93005 ELECTROCARDIOGRAM TRACING: CPT

## 2025-06-30 PROCEDURE — 99285 EMERGENCY DEPT VISIT HI MDM: CPT | Performed by: EMERGENCY MEDICINE

## 2025-06-30 PROCEDURE — 99285 EMERGENCY DEPT VISIT HI MDM: CPT

## 2025-06-30 PROCEDURE — 71046 X-RAY EXAM CHEST 2 VIEWS: CPT

## 2025-06-30 PROCEDURE — 85379 FIBRIN DEGRADATION QUANT: CPT | Performed by: INTERNAL MEDICINE

## 2025-06-30 RX ORDER — ATORVASTATIN CALCIUM 20 MG/1
20 TABLET, FILM COATED ORAL
Status: DISCONTINUED | OUTPATIENT
Start: 2025-06-30 | End: 2025-07-01 | Stop reason: HOSPADM

## 2025-06-30 RX ORDER — LOSARTAN POTASSIUM 25 MG/1
12.5 TABLET ORAL EVERY MORNING
Status: DISCONTINUED | OUTPATIENT
Start: 2025-07-01 | End: 2025-07-01 | Stop reason: HOSPADM

## 2025-06-30 RX ORDER — LEVOTHYROXINE SODIUM 175 UG/1
175 TABLET ORAL
Status: DISCONTINUED | OUTPATIENT
Start: 2025-07-01 | End: 2025-07-01 | Stop reason: HOSPADM

## 2025-06-30 RX ORDER — PANTOPRAZOLE SODIUM 40 MG/1
40 TABLET, DELAYED RELEASE ORAL
Status: DISCONTINUED | OUTPATIENT
Start: 2025-07-01 | End: 2025-07-01 | Stop reason: HOSPADM

## 2025-06-30 RX ORDER — CALCIUM CARBONATE 500 MG/1
500 TABLET, CHEWABLE ORAL 3 TIMES DAILY PRN
Status: DISCONTINUED | OUTPATIENT
Start: 2025-06-30 | End: 2025-07-01 | Stop reason: HOSPADM

## 2025-06-30 RX ADMIN — ATORVASTATIN CALCIUM 20 MG: 20 TABLET, FILM COATED ORAL at 21:02

## 2025-06-30 NOTE — ASSESSMENT & PLAN NOTE
Recently noted on CT scan in May when he was admitted for syncope/fall.  Unclear what recommendations are with regard to anticoagulation/DVT prophylaxis  For now no indication for such regardless

## 2025-06-30 NOTE — ED PROVIDER NOTES
Time reflects when diagnosis was documented in both MDM as applicable and the Disposition within this note       Time User Action Codes Description Comment    6/30/2025  9:17 AM Jaki Fan Add [R07.9] Chest pain, unspecified           ED Disposition       ED Disposition   Admit    Condition   Stable    Date/Time   Mon Jun 30, 2025  9:17 AM    Comment   --             Assessment & Plan       Medical Decision Making  This is a 80-year-old male patient with a relevant past medical history of CAD, aortic stenosis, recurrent high-grade heart block status post pacer placement presenting to the ED today for complaint of chest pain.  His chest pain is substernal, radiates to his bilateral chest, into his neck.  He denies any loss of consciousness, fever or chills, however has had some shortness of breath associated.  He denies any other complaints.  On exam he does not have any significant tenderness that I can appreciate.  He does not have any other significant abnormalities.  His differential diagnosis includes: Atypical ACS versus pneumonia versus fluid overload versus other.  Patient had a workup which was for the most part unremarkable.  He had a heart score of 7, initial troponin is 5, and pacer interrogation as well as EKG did not show any acute concerning findings.  Patient was then requested to be hospitalized by the hospitalist provider due to his heart score, hospitalist accepted without any further orders requested.    Amount and/or Complexity of Data Reviewed  External Data Reviewed: labs, radiology, ECG and notes.  Labs:  Decision-making details documented in ED Course.  Radiology: independent interpretation performed.    Risk  Decision regarding hospitalization.        ED Course as of 06/30/25 0938   Mon Jun 30, 2025   0908 Pacer interrogation unremarkable, no events of V. tach, A. tach, A-fib.  He is ventricularly paced 99% of the time.  He does have some atrial unipolar lead impedance, however otherwise  nothing else significant on his report.   0916 hs TnI 0hr: 5       Medications - No data to display    ED Risk Strat Scores   HEART Risk Score      Flowsheet Row Most Recent Value   Heart Score Risk Calculator    History 2 Filed at: 06/30/2025 0916   ECG 1 Filed at: 06/30/2025 0916   Age 2 Filed at: 06/30/2025 0916   Risk Factors 2 Filed at: 06/30/2025 0916   Troponin 0 Filed at: 06/30/2025 0916   HEART Score 7 Filed at: 06/30/2025 0916          HEART Risk Score      Flowsheet Row Most Recent Value   Heart Score Risk Calculator    History 2 Filed at: 06/30/2025 0916   ECG 1 Filed at: 06/30/2025 0916   Age 2 Filed at: 06/30/2025 0916   Risk Factors 2 Filed at: 06/30/2025 0916   Troponin 0 Filed at: 06/30/2025 0916   HEART Score 7 Filed at: 06/30/2025 0916                      No data recorded                            History of Present Illness       Chief Complaint   Patient presents with    Chest Pain     2 nitro and aspirin. Chest pain started today. Feels tightness and pressure        Past Medical History[1]   Past Surgical History[2]   Family History[3]   Social History[4]   E-Cigarette/Vaping    E-Cigarette Use Current Every Day User     Comments at night       E-Cigarette/Vaping Substances    Nicotine No     THC Yes     CBD No     Flavoring No     Other No     Unknown No       I have reviewed and agree with the history as documented.     This is a 80-year-old male patient with a relevant past medical history of CAD, hypertension, hyperlipidemia, aortic stenosis, recurrent high-grade heart block status post pacer placement 6 weeks ago, presenting to the ED today for complaint of chest pain.  Patient states that his pain is a tightness, was substernal, radiates across the bilateral aspect of his chest.  Patient also complains of some radiation into his neck, and shoulders.  He denies any fever, however has been short of breath.  He denies any nausea vomiting abdominal pain, diarrhea, constipation, focal  weakness, numbness or tingling, peripheral edema or erythema, or any other significantly associated symptoms.  On arrival to the ED he did receive nitroglycerin, aspirin, with improvement in his chest pain.  He however still has some tightness in his posterior neck and shoulders.  He denies any other complaints at this time.        Review of Systems   Constitutional:  Negative for chills and fever.   HENT:  Negative for ear pain and sore throat.    Eyes:  Negative for pain and visual disturbance.   Respiratory:  Positive for shortness of breath. Negative for cough.    Cardiovascular:  Positive for chest pain. Negative for palpitations.   Gastrointestinal:  Negative for abdominal pain and vomiting.   Genitourinary:  Negative for dysuria and hematuria.   Musculoskeletal:  Negative for arthralgias and back pain.   Skin:  Negative for color change and rash.   Neurological:  Negative for seizures and syncope.   All other systems reviewed and are negative.          Objective       ED Triage Vitals [06/30/25 0825]   Temperature Pulse Blood Pressure Respirations SpO2 Patient Position - Orthostatic VS   (!) 96.7 °F (35.9 °C) 65 119/65 20 90 % Sitting      Temp Source Heart Rate Source BP Location FiO2 (%) Pain Score    Axillary Monitor Right arm -- --      Vitals      Date and Time Temp Pulse SpO2 Resp BP Pain Score FACES Pain Rating User   06/30/25 0900 -- 60 96 % -- -- -- -- AF   06/30/25 0837 -- -- 95 % -- -- -- -- AF   06/30/25 0825 96.7 °F (35.9 °C) 65 90 % 20 119/65 -- -- AF            Physical Exam  Vitals and nursing note reviewed.   Constitutional:       General: He is not in acute distress.     Appearance: Normal appearance. He is well-developed and normal weight. He is not ill-appearing.   HENT:      Head: Normocephalic and atraumatic.      Right Ear: External ear normal.      Left Ear: External ear normal.      Nose: Nose normal. No congestion or rhinorrhea.      Mouth/Throat:      Mouth: Mucous membranes are  moist.      Pharynx: Oropharynx is clear.     Eyes:      General: No scleral icterus.     Conjunctiva/sclera: Conjunctivae normal.       Cardiovascular:      Rate and Rhythm: Normal rate and regular rhythm.      Pulses: Normal pulses.      Heart sounds: Normal heart sounds. No murmur heard.  Pulmonary:      Effort: Pulmonary effort is normal. No respiratory distress.      Breath sounds: Normal breath sounds. No wheezing or rales.   Chest:      Comments: Postsurgical pacer site wound seems to be healing well, no significant swelling erythema or discharge noted.  Abdominal:      General: Abdomen is flat. Bowel sounds are normal. There is no distension.      Palpations: Abdomen is soft. There is no mass.      Tenderness: There is no abdominal tenderness.     Musculoskeletal:         General: Normal range of motion.      Cervical back: Normal range of motion and neck supple. No tenderness.      Right lower leg: No edema.      Left lower leg: No edema.     Skin:     General: Skin is warm and dry.      Capillary Refill: Capillary refill takes less than 2 seconds.     Neurological:      General: No focal deficit present.      Mental Status: He is alert and oriented to person, place, and time. Mental status is at baseline.      Motor: No weakness.     Psychiatric:         Mood and Affect: Mood normal.         Behavior: Behavior normal.         Thought Content: Thought content normal.         Judgment: Judgment normal.         Results Reviewed       Procedure Component Value Units Date/Time    HS Troponin 0hr (reflex protocol) [[id number]]  (Normal) Collected: 06/30/25 0834    Lab Status: Final result Specimen: Blood from Arm, Left Updated: 06/30/25 0915     hs TnI 0hr 5 ng/L     HS Troponin I 2hr [[id number]]     Lab Status: No result Specimen: Blood     Comprehensive metabolic panel [[id number]]  (Abnormal) Collected: 06/30/25 0834    Lab Status: Final result Specimen: Blood from Arm, Left Updated: 06/30/25 0908     Sodium  140 mmol/L      Potassium 4.0 mmol/L      Chloride 108 mmol/L      CO2 28 mmol/L      ANION GAP 4 mmol/L      BUN 19 mg/dL      Creatinine 0.84 mg/dL      Glucose 125 mg/dL      Calcium 9.0 mg/dL      AST 15 U/L      ALT 11 U/L      Alkaline Phosphatase 41 U/L      Total Protein 6.3 g/dL      Albumin 3.9 g/dL      Total Bilirubin 0.61 mg/dL      eGFR 83 ml/min/1.73sq m     Narrative:      National Kid[employer] Foundation guidelines for Chronic Kidney Disease (CKD):     Stage 1 with normal or high GFR (GFR > 90 mL/min/1.73 square meters)    Stage 2 Mild CKD (GFR = 60-89 mL/min/1.73 square meters)    Stage 3A Moderate CKD (GFR = 45-59 mL/min/1.73 square meters)    Stage 3B Moderate CKD (GFR = 30-44 mL/min/1.73 square meters)    Stage 4 Severe CKD (GFR = 15-29 mL/min/1.73 square meters)    Stage 5 End Stage CKD (GFR <15 mL/min/1.73 square meters)  Note: GFR calculation is accurate only with a steady state creatinine    CBC and differential [[id number]]  (Abnormal) Collected: 06/30/25 0834    Lab Status: Final result Specimen: Blood from Arm, Left Updated: 06/30/25 0842     WBC 11.66 Thousand/uL      RBC 4.02 Million/uL      Hemoglobin 12.8 g/dL      Hematocrit 39.6 %      MCV 99 fL      MCH 31.8 pg      MCHC 32.3 g/dL      RDW 13.8 %      MPV 8.8 fL      Platelets 299 Thousands/uL      nRBC 0 /100 WBCs      Segmented % 69 %      Immature Grans % 0 %      Lymphocytes % 19 %      Monocytes % 10 %      Eosinophils Relative 1 %      Basophils Relative 1 %      Absolute Neutrophils 8.00 Thousands/µL      Absolute Immature Grans 0.03 Thousand/uL      Absolute Lymphocytes 2.24 Thousands/µL      Absolute Monocytes 1.21 Thousand/µL      Eosinophils Absolute 0.12 Thousand/µL      Basophils Absolute 0.06 Thousands/µL             XR chest 2 views   ED Interpretation by Jaki Fan MD (06/30 0932)   No acute cardiopulmonary abnormality.  Blunting of the right costophrenic angle, question small right pleural effusion           Procedures    ED Medication and Procedure Management   Prior to Admission Medications   Prescriptions Last Dose Informant Patient Reported? Taking?   ATORVASTATIN CALCIUM PO Past Week Self Yes Yes   Sig: Take 20 mg by mouth daily at bedtime   Cholecalciferol (Vitamin D) 50 MCG (2000 UT) CAPS Past Week Self Yes Yes   Sig: Take 2,000 Units by mouth in the morning and 2,000 Units in the evening.   calcium carbonate (OS-JACOB) 1250 (500 Ca) MG chewable tablet Past Week Self Yes Yes   Sig: Chew in the morning.   famotidine (PEPCID) 20 mg tablet 6/29/2025 Morning Self No Yes   Sig: Take 1 tablet (20 mg total) by mouth 2 (two) times a day   levETIRAcetam (KEPPRA) 500 mg tablet Not Taking  No No   Sig: Take 1 tablet (500 mg total) by mouth every 12 (twelve) hours   Patient not taking: Reported on 6/30/2025   levothyroxine 175 mcg tablet 6/29/2025 Morning Self Yes Yes   Sig: Take 175 mcg by mouth every morning   losartan (COZAAR) 25 mg tablet 6/29/2025 Morning Self Yes Yes   Sig: Take 12.5 mg by mouth every morning   omeprazole (PriLOSEC) 20 mg delayed release capsule 6/29/2025 Morning  No Yes   Sig: Take 2 capsules (40 mg total) by mouth 2 (two) times a day   sildenafil (VIAGRA) 100 mg tablet Past Month Self Yes Yes   Sig: as needed      Facility-Administered Medications: None     Patient's Medications   Discharge Prescriptions    No medications on file     No discharge procedures on file.  ED SEPSIS DOCUMENTATION   Time reflects when diagnosis was documented in both MDM as applicable and the Disposition within this note       Time User Action Codes Description Comment    6/30/2025  9:17 AM Jaki Fan Add [R07.9] Chest pain, unspecified                      [1]   Past Medical History:  Diagnosis Date    Grimaldo esophagus     EGD with RFA    Colon polyp     Decreased bone density     Disease of thyroid gland     hyperthyroid-goiter-thyroidectomy    Erectile dysfunction     Full dentures     GERD (gastroesophageal reflux  "disease)     Hyperlipidemia     Hypertension     Kyphosis     Osteopenia     Osteoporosis     Scoliosis     Seasonal allergies     Spondylosis     Wears glasses    [2]   Past Surgical History:  Procedure Laterality Date    CARDIAC ELECTROPHYSIOLOGY PROCEDURE N/A 2025    Procedure: Cardiac pacer implant;  Surgeon: Fer Perez MD;  [facility];  Service: Cardiology    COLONOSCOPY      EGD      RFA x2  and , 3/2022 (RFA not done)    PARATHYROID GLAND SURGERY      2 removed 2 remain    THYROIDECTOMY      total    TONSILLECTOMY      as child   [3]   Family History  Problem Relation Name Age of Onset    Colon cancer Father      Cancer Father          colon    Heart disease Father      Thyroid disease Mother          goiter    Other Sister          something like \"alzheimers\"    Cancer Paternal Aunt          colon    Thyroid disease Sister     [4]   Social History  Tobacco Use    Smoking status: Former     Current packs/day: 0.00     Types: Cigarettes     Quit date:      Years since quittin.5    Smokeless tobacco: Never   Vaping Use    Vaping status: Every Day    Substances: THC   Substance Use Topics    Alcohol use: Yes     Alcohol/week: 7.0 standard drinks of alcohol     Types: 7 Glasses of wine per week     Comment: 1 glass with dinner    Drug use: Not Currently     Frequency: 4.0 times per week     Types: Marijuana     Comment: smokes joints prior to using e-cigarettes THC        Amr Alivia Fan MD  25 0938    "

## 2025-06-30 NOTE — ASSESSMENT & PLAN NOTE
BP is 149/75  Continue losartan   Had elevated blood pressure this morning when he measured it at home in the 180s this was along with his chest pain

## 2025-06-30 NOTE — ASSESSMENT & PLAN NOTE
Presented to the hospital 5/12/2020 2:25 episodes of syncope.  Small SAH.  No surgical intervention.  Follow-up CT to head showed stable SAH.  That admission noted to have recurrent high degree AV block with ventricular standstill was seen on telemetry requiring epinephrine drip and TVP with eventual permanent pacemaker.  No recurrent syncope.

## 2025-06-30 NOTE — ASSESSMENT & PLAN NOTE
Medtronic dual chamber PPM for high degree AV block  Interrogation- no events. Atrial unipolar lead impedance warning 6/28 ( not associated with today's event but will reach out to rep to assure no reprogramming needed)

## 2025-06-30 NOTE — H&P
H&P - Hospitalist   Name: Leeroy Santos 80 y.o. male I MRN: [id number]  Unit/Bed#: S -01 I Date of Admission: 6/30/2025   Date of Service: 6/30/2025 I Hospital Day: 0     Assessment & Plan  HTN (hypertension)  BP is 149/75  Continue losartan   Had elevated blood pressure this morning when he measured it at home in the 180s this was along with his chest pain  SAH (subarachnoid hemorrhage) (HCC)  Recently noted on CT scan in May when he was admitted for syncope/fall.  Unclear what recommendations are with regard to anticoagulation/DVT prophylaxis  For now no indication for such regardless  Chest pain  Patient noted chest pain that started at 5 AM this morning when he woke up, radiated to his arm and jaw associated with diaphoresis.  Pain was ongoing until some hours later when he received nitroglycerin in the ambulance.  2 troponins are negative  EKG is paced no clear evidence of ischemia  Monitor on telemetry, ordered nuclear stress test this morning-  un-clear if this can be done today  We will consult cardiology and get an echocardiogram as well      VTE Pharmacologic Prophylaxis:   Moderate Risk (Score 3-4) - Pharmacological DVT Prophylaxis Ordered: None for now given history of subarachnoid and patient not been immobile for too long, if he stays in hospital longer than a day will need to be reevaluated.  Code Status:Full code.   Discussion with family: Updated  (wife and and two sons) at bedside.    Anticipated Length of Stay: Patient will be admitted on an observation basis with an anticipated length of stay of less than 2 midnights secondary to chest pain, needs ischemic eval and cardiology eval. .    History of Present Illness   Chief Complaint: chest pain    Leeroy Santos is a 80 y.o. male with a PMH of syncope, subarachnoid hemorrhage, pacemaker placement some weeks ago, who presents with left-sided chest pain radiating to his jaw and arm associated with diaphoresis lasting for several  hours until he received nitroglycerin in the ambulance.    The time his chest pain started he was waking from sleep at approximately 5 AM, he measured his blood pressure and it was high in the 180s.    Then his blood pressure is normalized and his chest pain has resolved, his EKG is nonischemic and shows a paced rhythm, his troponins x 2 have been negative.        Review of Systems   Constitutional: Negative.    HENT: Negative.     Eyes: Negative.    Respiratory:  Positive for chest tightness and shortness of breath.    Cardiovascular:  Positive for chest pain.   Gastrointestinal: Negative.    Endocrine: Negative.    Genitourinary: Negative.    Musculoskeletal: Negative.    Allergic/Immunologic: Negative.    Neurological: Negative.    Hematological: Negative.    Psychiatric/Behavioral: Negative.         Historical Information   Past Medical History[1]  Past Surgical History[2]  Social History[3]  E-Cigarette/Vaping    E-Cigarette Use Current Every Day User     Comments at night      E-Cigarette/Vaping Substances    Nicotine No     THC Yes     CBD No     Flavoring No     Other No     Unknown No      Family History[4]  Social History:  Marital Status: /Civil Union   Occupation: retired.   Patient Pre-hospital Living Situation: Home  Patient Pre-hospital Level of Mobility: walks  Patient Pre-hospital Diet Restrictions: none.     Meds/Allergies   I have reviewed home medications with patient personally.  Prior to Admission medications    Medication Sig Start Date End Date Taking? Authorizing Provider   ATORVASTATIN CALCIUM PO Take 20 mg by mouth daily at bedtime   Yes Historical Provider, MD   calcium carbonate (OS-JACOB) 1250 (500 Ca) MG chewable tablet Chew in the morning.   Yes Historical Provider, MD   Cholecalciferol (Vitamin D) 50 MCG (2000 UT) CAPS Take 2,000 Units by mouth in the morning and 2,000 Units in the evening.   Yes Historical Provider, MD   famotidine (PEPCID) 20 mg tablet Take 1 tablet (20 mg  total) by mouth 2 (two) times a day 11/6/19  Yes Nancy Carrasco PA-C   levothyroxine 175 mcg tablet Take 175 mcg by mouth every morning   Yes Historical Provider, MD   losartan (COZAAR) 25 mg tablet Take 12.5 mg by mouth every morning   Yes Historical Provider, MD   omeprazole (PriLOSEC) 20 mg delayed release capsule Take 2 capsules (40 mg total) by mouth 2 (two) times a day 4/15/25  Yes Quinton Camarena MD   sildenafil (VIAGRA) 100 mg tablet as needed 8/20/20  Yes Historical Provider, MD   levETIRAcetam (KEPPRA) 500 mg tablet Take 1 tablet (500 mg total) by mouth every 12 (twelve) hours  Patient not taking: Reported on 6/30/2025 5/15/25   Tuan Asencio PA-C     No Known Allergies    Objective :  Temp:  [96.7 °F (35.9 °C)] 96.7 °F (35.9 °C)  HR:  [60-65] 64  BP: (119-149)/(65-75) 149/75  Resp:  [20] 20  SpO2:  [90 %-98 %] 98 %  O2 Device: None (Room air)  Nasal Cannula O2 Flow Rate (L/min):  [3 L/min] 3 L/min    Physical Exam  Constitutional:       General: He is not in acute distress.     Appearance: He is not ill-appearing, toxic-appearing or diaphoretic.   HENT:      Head: Normocephalic.      Mouth/Throat:      Mouth: Mucous membranes are moist.     Eyes:      General: No scleral icterus.        Right eye: No discharge.         Left eye: No discharge.      Pupils: Pupils are equal, round, and reactive to light.       Cardiovascular:      Rate and Rhythm: Normal rate.      Heart sounds: No murmur heard.     No friction rub. No gallop.   Pulmonary:      Effort: No respiratory distress.      Breath sounds: No stridor. No wheezing, rhonchi or rales.   Chest:      Chest wall: No tenderness.   Abdominal:      General: Abdomen is flat. There is no distension.      Tenderness: There is no abdominal tenderness. There is no right CVA tenderness, left CVA tenderness, guarding or rebound.      Hernia: No hernia is present.     Musculoskeletal:      Cervical back: Normal range of motion.      Right lower leg: No edema.     "  Left lower leg: No edema.     Skin:     Capillary Refill: Capillary refill takes less than 2 seconds.      Coloration: Skin is not jaundiced or pale.      Findings: No bruising, erythema, lesion or rash.     Neurological:      General: No focal deficit present.      Mental Status: He is alert and oriented to person, place, and time.      Cranial Nerves: No cranial nerve deficit.      Sensory: No sensory deficit.      Motor: No weakness.      Coordination: Coordination normal.      Gait: Gait normal.      Deep Tendon Reflexes: Reflexes normal.     Psychiatric:         Mood and Affect: Mood normal.          Lines/Drains:            Lab Results: I have reviewed the following results:  Results from last 7 days   Lab Units 06/30/25  0834   WBC Thousand/uL 11.66*   HEMOGLOBIN g/dL 12.8   HEMATOCRIT % 39.6   PLATELETS Thousands/uL 299   SEGS PCT % 69   LYMPHO PCT % 19   MONO PCT % 10   EOS PCT % 1     Results from last 7 days   Lab Units 06/30/25  0834   SODIUM mmol/L 140   POTASSIUM mmol/L 4.0   CHLORIDE mmol/L 108   CO2 mmol/L 28   BUN mg/dL 19   CREATININE mg/dL 0.84   ANION GAP mmol/L 4   CALCIUM mg/dL 9.0   ALBUMIN g/dL 3.9   TOTAL BILIRUBIN mg/dL 0.61   ALK PHOS U/L 41   ALT U/L 11   AST U/L 15   GLUCOSE RANDOM mg/dL 125             No results found for: \"HGBA1C\"        Imaging Results Review: No pertinent imaging studies reviewed.  Other Study Results Review: No additional pertinent studies reviewed.    Administrative Statements   I have spent a total time of 90 minutes in caring for this patient on the day of the visit/encounter including Diagnostic results, Prognosis, Risks and benefits of tx options, Instructions for management, Patient and family education, Importance of tx compliance, Risk factor reductions, Impressions, Counseling / Coordination of care, Documenting in the medical record, Reviewing/placing orders in the medical record (including tests, medications, and/or procedures), Obtaining or reviewing " "history  , and Communicating with other healthcare professionals .    ** Please Note: This note has been constructed using a voice recognition system. **         [1]   Past Medical History:  Diagnosis Date    Grimaldo esophagus     EGD with RFA    Colon polyp     Decreased bone density     Disease of thyroid gland     hyperthyroid-goiter-thyroidectomy    Erectile dysfunction     Full dentures     GERD (gastroesophageal reflux disease)     Hyperlipidemia     Hypertension     Kyphosis     Osteopenia     Osteoporosis     Scoliosis     Seasonal allergies     Spondylosis     Wears glasses    [2]   Past Surgical History:  Procedure Laterality Date    CARDIAC ELECTROPHYSIOLOGY PROCEDURE N/A 2025    Procedure: Cardiac pacer implant;  Surgeon: Fer Perez MD;  Location: [facility];  Service: Cardiology    COLONOSCOPY      EGD      RFA x2  and , 3/2022 (RFA not done)    PARATHYROID GLAND SURGERY      2 removed 2 remain    THYROIDECTOMY      total    TONSILLECTOMY      as child   [3]   Social History  Tobacco Use    Smoking status: Former     Current packs/day: 0.00     Types: Cigarettes     Quit date:      Years since quittin.5    Smokeless tobacco: Never   Vaping Use    Vaping status: Every Day    Substances: THC   Substance and Sexual Activity    Alcohol use: Yes     Alcohol/week: 7.0 standard drinks of alcohol     Types: 7 Glasses of wine per week     Comment: 1 glass with dinner    Drug use: Not Currently     Frequency: 4.0 times per week     Types: Marijuana     Comment: smokes joints prior to using e-cigarettes THC    Sexual activity: Yes     Partners: Female   [4]   Family History  Problem Relation Name Age of Onset    Colon cancer Father      Cancer Father          colon    Heart disease Father      Thyroid disease Mother          goiter    Other Sister          something like \"alzheimers\"    Cancer Paternal Aunt          colon    Thyroid disease Sister       "

## 2025-06-30 NOTE — ASSESSMENT & PLAN NOTE
Patient noted chest pain that started at 5 AM this morning when he woke up, radiated to his arm and jaw associated with diaphoresis.  Pain was ongoing until some hours later when he received nitroglycerin in the ambulance.  2 troponins are negative  EKG is paced no clear evidence of ischemia  Monitor on telemetry, ordered nuclear stress test this morning-  un-clear if this can be done today  We will consult cardiology and get an echocardiogram as well

## 2025-06-30 NOTE — CONSULTS
"Consultation - Cardiology Team 1  Leeroy Santos 80 y.o. male MRN: [id number]  Unit/Bed#: S -01 Encounter: [id number]  Assessment & Plan  Chest pain  Prior to getting OOB this am he felt left sided chest discomfort \" over my pacemaker\". It began to radiate across chest, arms  and both sides of neck. Worse with deep breath. There was diaphoresis and lightheadedness.  Eventually called EMS- given 2 SLG NTG no relief. After few hours was complete relief in ER. No medications given.   No known CAD. No exertional symptoms.   0-hour troponin 5 with 2-hour troponin of 6. Will check 4 hours.   ECG AV paced  TTE 5/13/2025 normal LV function.  Mild aortic stenosis.  Mild much regurgitation.  He is now painfree.   [facility] has ordered stress test, will f/u with results once available. Also interrogating PPM.   HTN (hypertension)  Blood pressure well-controlled.  He takes losartan 25mg  SAH (subarachnoid hemorrhage) (HCC)  Presented to the hospital 5/12/2020 2:25 episodes of syncope.  Small SAH.  No surgical intervention.  Follow-up CT to head showed stable SAH.  That admission noted to have recurrent high degree AV block with ventricular standstill was seen on telemetry requiring epinephrine drip and TVP with eventual permanent pacemaker.  No recurrent syncope.   Pacemaker  Medtronic dual chamber PPM for high degree AV block  Interrogation- no events. Atrial unipolar lead impedance warning 6/28 ( not associated with today's event but will reach out to rep to assure no reprogramming needed)      History of Present Illness   Physician Requesting Consult: Kavya Jerry MD  Reason for Consult / Principal Problem: chest pain    HPI: Leeroy Santos is a 80 y.o. year old male with dual-chamber Medtronic PPM for high degree AV block , hypertension, SAH May 2025 s/p syncope  who presents with chest pain at 5 AM upon awakening.  Radiation to both arms and both sides of neck with diaphoresis. Cp was worse with deep breath. In route " 2 SL NTG without much relief. He also received 4 baby Asa. Slowly resolved in ED without intervention.     Inpatient consult to Cardiology  Consult performed by: MODESTO Lazaro  Consult ordered by: Kavya Jerry MD        Review of Systems    Historical Information   Past Medical History[1]  Past Surgical History[2]  Social History     Substance and Sexual Activity   Alcohol Use Yes    Alcohol/week: 7.0 standard drinks of alcohol    Types: 7 Glasses of wine per week    Comment: 1 glass with dinner     Social History     Substance and Sexual Activity   Drug Use Not Currently    Frequency: 4.0 times per week    Types: Marijuana    Comment: smokes joints prior to using e-cigarettes THC     Tobacco Use History[3]  Family History: Family History[4]    Meds/Allergies   current meds: No current facility-administered medications for this encounter. and PTA meds:    Medications Prior to Admission:     ATORVASTATIN CALCIUM PO    calcium carbonate (OS-JACOB) 1250 (500 Ca) MG chewable tablet    Cholecalciferol (Vitamin D) 50 MCG (2000 UT) CAPS    famotidine (PEPCID) 20 mg tablet    levothyroxine 175 mcg tablet    losartan (COZAAR) 25 mg tablet    omeprazole (PriLOSEC) 20 mg delayed release capsule    sildenafil (VIAGRA) 100 mg tablet    levETIRAcetam (KEPPRA) 500 mg tablet  Allergies[5]    Objective   Vitals: Blood pressure 149/75, pulse 64, temperature (!) 96.7 °F (35.9 °C), temperature source Axillary, resp. rate 20, SpO2 98%.  Orthostatic Blood Pressures      Flowsheet Row Most Recent Value   Blood Pressure 149/75 filed at 06/30/2025 1017   Patient Position - Orthostatic VS Sitting filed at 06/30/2025 0825              Intake/Output Summary (Last 24 hours) at 6/30/2025 1321  Last data filed at 6/30/2025 1313  Gross per 24 hour   Intake 150 ml   Output --   Net 150 ml       Invasive Devices       Peripheral Intravenous Line  Duration             Peripheral IV 06/30/25 Left;Ventral (anterior) Forearm <1 day                     Physical Exam: /75   Pulse 64   Temp (!) 96.7 °F (35.9 °C) (Axillary)   Resp 20   SpO2 98%   General Appearance:    Alert, cooperative, no distress, appears stated age   Head:    Normocephalic, no scleral icterus   Eyes:    PERRL   Nose:   Nares normal, septum midline, mucosa normal, no drainage    Throat:   Lips, mucosa, and tongue normal   Neck:   Supple, symmetrical, trachea midline     no carotid bruit or JVD   Back:     Symmetric   Lungs:     Clear to auscultation bilaterally, respirations unlabored   Chest Wall:    No tenderness or deformity    Heart:    Regular rate and rhythm, S1 and S2 normal, no murmur, rub   or gallop   Abdomen:     Soft, non-tender, bowel sounds active all four quadrants,     no masses, no organomegaly   Extremities:   Extremities normal, atraumatic, no cyanosis or edema   Pulses:   2+ and symmetric all extremities   Skin:   Skin color, texture, turgor normal, no rashes or lesions   Neurologic:   Alert and oriented to person place and time. No focal deficits       Lab Results:   Recent Results (from the past 72 hours)   ECG 12 lead    Collection Time: 06/30/25  8:29 AM   Result Value Ref Range    Ventricular Rate 60 BPM    Atrial Rate 60 BPM    VA Interval 176 ms    QRSD Interval 148 ms    QT Interval 462 ms    QTC Interval 462 ms    P Axis 51 degrees    QRS Axis -78 degrees    T Wave Axis 40 degrees   CBC and differential    Collection Time: 06/30/25  8:34 AM   Result Value Ref Range    WBC 11.66 (H) 4.31 - 10.16 Thousand/uL    RBC 4.02 3.88 - 5.62 Million/uL    Hemoglobin 12.8 12.0 - 17.0 g/dL    Hematocrit 39.6 36.5 - 49.3 %    MCV 99 (H) 82 - 98 fL    MCH 31.8 26.8 - 34.3 pg    MCHC 32.3 31.4 - 37.4 g/dL    RDW 13.8 11.6 - 15.1 %    MPV 8.8 (L) 8.9 - 12.7 fL    Platelets 299 149 - 390 Thousands/uL    nRBC 0 /100 WBCs    Segmented % 69 43 - 75 %    Immature Grans % 0 0 - 2 %    Lymphocytes % 19 14 - 44 %    Monocytes % 10 4 - 12 %    Eosinophils Relative 1 0 - 6 %  "   Basophils Relative 1 0 - 1 %    Absolute Neutrophils 8.00 (H) 1.85 - 7.62 Thousands/µL    Absolute Immature Grans 0.03 0.00 - 0.20 Thousand/uL    Absolute Lymphocytes 2.24 0.60 - 4.47 Thousands/µL    Absolute Monocytes 1.21 0.17 - 1.22 Thousand/µL    Eosinophils Absolute 0.12 0.00 - 0.61 Thousand/µL    Basophils Absolute 0.06 0.00 - 0.10 Thousands/µL   Comprehensive metabolic panel    Collection Time: 06/30/25  8:34 AM   Result Value Ref Range    Sodium 140 135 - 147 mmol/L    Potassium 4.0 3.5 - 5.3 mmol/L    Chloride 108 96 - 108 mmol/L    CO2 28 21 - 32 mmol/L    ANION GAP 4 4 - 13 mmol/L    BUN 19 5 - 25 mg/dL    Creatinine 0.84 0.60 - 1.30 mg/dL    Glucose 125 65 - 140 mg/dL    Calcium 9.0 8.4 - 10.2 mg/dL    AST 15 13 - 39 U/L    ALT 11 7 - 52 U/L    Alkaline Phosphatase 41 34 - 104 U/L    Total Protein 6.3 (L) 6.4 - 8.4 g/dL    Albumin 3.9 3.5 - 5.0 g/dL    Total Bilirubin 0.61 0.20 - 1.00 mg/dL    eGFR 83 ml/min/1.73sq m   HS Troponin 0hr (reflex protocol)    Collection Time: 06/30/25  8:34 AM   Result Value Ref Range    hs TnI 0hr 5 \"Refer to ACS Flowchart\"- see link ng/L   HS Troponin I 4hr    Collection Time: 06/30/25 11:08 AM   Result Value Ref Range    hs TnI 4hr 6 \"Refer to ACS Flowchart\"- see link ng/L    Delta 4hr hsTnI 1 <20 ng/L     Imaging: I have personally reviewed pertinent reports.    EKG:AV paced    Code Status: Prior  Advance Directive and Living Will:      Power of :    POLST:      Counseling / Coordination of Care  Total floor / unit time spent today 45 minutes.  Greater than 50% of total time was spent with the patient and / or family counseling and / or coordination of care.           [1]   Past Medical History:  Diagnosis Date    [place] esophagus     EGD with RFA    Colon polyp     Decreased bone density     Disease of thyroid gland     hyperthyroid-goiter-thyroidectomy    Erectile dysfunction     Full dentures     GERD (gastroesophageal reflux disease)     Hyperlipidemia  " "   Hypertension     Kyphosis     Osteopenia     Osteoporosis     Scoliosis     Seasonal allergies     Spondylosis     Wears glasses    [2]   Past Surgical History:  Procedure Laterality Date    CARDIAC ELECTROPHYSIOLOGY PROCEDURE N/A 2025    Procedure: Cardiac pacer implant;  Surgeon: Fer Perez MD;  Location: [facility];  Service: Cardiology    COLONOSCOPY      EGD      RFA x2  and , 3/2022 (RFA not done)    PARATHYROID GLAND SURGERY      2 removed 2 remain    THYROIDECTOMY      total    TONSILLECTOMY      as child   [3]   Social History  Tobacco Use   Smoking Status Former    Current packs/day: 0.00    Types: Cigarettes    Quit date:     Years since quittin.5   Smokeless Tobacco Never   [4]   Family History  Problem Relation Name Age of Onset    Colon cancer Father      Cancer Father          colon    Heart disease Father      Thyroid disease Mother          goiter    Other Sister          something like \"alzheimers\"    Cancer Paternal Aunt          colon    Thyroid disease Sister     [5] No Known Allergies    "

## 2025-06-30 NOTE — ASSESSMENT & PLAN NOTE
"Prior to getting OOB this am he felt left sided chest discomfort \" over my pacemaker\". It began to radiate across chest, arms  and both sides of neck. Worse with deep breath. There was diaphoresis and lightheadedness.  Eventually called EMS- given 2 SLG NTG no relief. After few hours was complete relief in ER. No medications given.   No known CAD. No exertional symptoms.   0-hour troponin 5 with 2-hour troponin of 6. Will check 4 hours.   ECG AV paced  TTE 5/13/2025 normal LV function.  Mild aortic stenosis.  Mild much regurgitation.  He is now painfree.   [facility] has ordered stress test, will f/u with results once available. Also interrogating PPM.   "

## 2025-06-30 NOTE — PLAN OF CARE
Problem: PAIN - ADULT  Goal: Verbalizes/displays adequate comfort level or baseline comfort level  Description: Interventions:  - Encourage patient to monitor pain and request assistance  - Assess pain using appropriate pain scale  - Administer analgesics as ordered based on type and severity of pain and evaluate response  - Implement non-pharmacological measures as appropriate and evaluate response  - Consider cultural and social influences on pain and pain management  - Notify physician/advanced practitioner if interventions unsuccessful or patient reports new pain  - Educate patient/family on pain management process including their role and importance of  reporting pain   - Provide non-pharmacologic/complimentary pain relief interventions  Outcome: Progressing     Problem: INFECTION - ADULT  Goal: Absence or prevention of progression during hospitalization  Description: INTERVENTIONS:  - Assess and monitor for signs and symptoms of infection  - Monitor lab/diagnostic results  - Monitor all insertion sites, i.e. indwelling lines, tubes, and drains  - Monitor endotracheal if appropriate and nasal secretions for changes in amount and color  - [place] appropriate cooling/warming therapies per order  - Administer medications as ordered  - Instruct and encourage patient and family to use good hand hygiene technique  - Identify and instruct in appropriate isolation precautions for identified infection/condition  Outcome: Progressing  Goal: Absence of fever/infection during neutropenic period  Description: INTERVENTIONS:  - Monitor WBC  - Perform strict hand hygiene  - Limit to healthy visitors only  - No plants, dried, fresh or silk flowers with bedolla in patient room  Outcome: Progressing     Problem: SAFETY ADULT  Goal: Patient will remain free of falls  Description: INTERVENTIONS:  - Educate patient/family on patient safety including physical limitations  - Instruct patient to call for assistance with activity   -  Consider consulting OT/PT to assist with strengthening/mobility based on AM PAC & JH-HLM score  - Consult OT/PT to assist with strengthening/mobility   - Keep Call bell within reach  - Keep bed low and locked with side rails adjusted as appropriate  - Keep care items and personal belongings within reach  - Initiate and maintain comfort rounds  - Make Fall Risk Sign visible to staff  - Offer Toileting every 2 Hours, in advance of need  - Initiate/Maintain bed/ chair alarm  - Obtain necessary fall risk management equipment  - Apply yellow socks and bracelet for high fall risk patients  - Consider moving patient to room near nurses station  Outcome: Progressing  Goal: Maintain or return to baseline ADL function  Description: INTERVENTIONS:  -  Assess patient's ability to carry out ADLs; assess patient's baseline for ADL function and identify physical deficits which impact ability to perform ADLs (bathing, care of mouth/teeth, toileting, grooming, dressing, etc.)  - Assess/evaluate cause of self-care deficits   - Assess range of motion  - Assess patient's mobility; develop plan if impaired  - Assess patient's need for assistive devices and provide as appropriate  - Encourage maximum independence but intervene and supervise when necessary  - Involve family in performance of ADLs  - Assess for home care needs following discharge   - Consider OT consult to assist with ADL evaluation and planning for discharge  - Provide patient education as appropriate  - Monitor functional capacity and physical performance, use of AM PAC & JH-HLM   - Monitor gait, balance and fatigue with ambulation    Outcome: Progressing  Goal: Maintains/Returns to pre admission functional level  Description: INTERVENTIONS:  - Perform AM-PAC 6 Click Basic Mobility/ Daily Activity assessment daily.  - Set and communicate daily mobility goal to care team and patient/family/caregiver.   - Collaborate with rehabilitation services on mobility goals if  consulted  - Out of bed for meals 3 times a day  - Out of bed for toileting  - Record patient progress and toleration of activity level   Outcome: Progressing     Problem: DISCHARGE PLANNING  Goal: Discharge to home or other facility with appropriate resources  Description: INTERVENTIONS:  - Identify barriers to discharge w/patient and caregiver  - Arrange for needed discharge resources and transportation as appropriate  - Identify discharge learning needs (meds, wound care, etc.)  - Arrange for interpretive services to assist at discharge as needed  - Refer to Case Management Department for coordinating discharge planning if the patient needs post-hospital services based on physician/advanced practitioner order or complex needs related to functional status, cognitive ability, or social support system  Outcome: Progressing     Problem: Knowledge Deficit  Goal: Patient/family/caregiver demonstrates understanding of disease process, treatment plan, medications, and discharge instructions  Description: Complete learning assessment and assess knowledge base.  Interventions:  - Provide teaching at level of understanding  - Provide teaching via preferred learning methods  Outcome: Progressing     Problem: CARDIOVASCULAR - ADULT  Goal: Maintains optimal cardiac output and hemodynamic stability  Description: INTERVENTIONS:  - Monitor I/O, vital signs and rhythm  - Monitor for S/S and trends of decreased cardiac output  - Administer and titrate ordered vasoactive medications to optimize hemodynamic stability  - Assess quality of pulses, skin color and temperature  - Assess for signs of decreased coronary artery perfusion  - Instruct patient to report change in severity of symptoms  Outcome: Progressing  Goal: Absence of cardiac dysrhythmias or at baseline rhythm  Description: INTERVENTIONS:  - Continuous cardiac monitoring, vital signs, obtain 12 lead EKG if ordered  - Administer antiarrhythmic and heart rate control  medications as ordered  - Monitor electrolytes and administer replacement therapy as ordered  Outcome: Progressing

## 2025-07-01 ENCOUNTER — APPOINTMENT (OUTPATIENT)
Dept: NUCLEAR MEDICINE | Facility: HOSPITAL | Age: 80
End: 2025-07-01
Payer: COMMERCIAL

## 2025-07-01 ENCOUNTER — APPOINTMENT (OUTPATIENT)
Dept: NON INVASIVE DIAGNOSTICS | Facility: HOSPITAL | Age: 80
End: 2025-07-01
Payer: COMMERCIAL

## 2025-07-01 ENCOUNTER — APPOINTMENT (OUTPATIENT)
Dept: CT IMAGING | Facility: HOSPITAL | Age: 80
End: 2025-07-01
Payer: COMMERCIAL

## 2025-07-01 VITALS
TEMPERATURE: 98.2 F | SYSTOLIC BLOOD PRESSURE: 172 MMHG | HEIGHT: 68 IN | OXYGEN SATURATION: 98 % | DIASTOLIC BLOOD PRESSURE: 79 MMHG | WEIGHT: 163 LBS | RESPIRATION RATE: 20 BRPM | HEART RATE: 68 BPM | BODY MASS INDEX: 24.71 KG/M2

## 2025-07-01 LAB
CHEST PAIN STATEMENT: NORMAL
MAX DIASTOLIC BP: 82 MMHG
MAX PREDICTED HEART RATE: 141 BPM
PROTOCOL NAME: NORMAL
RATE PRESSURE PRODUCT: NORMAL
REASON FOR TERMINATION: NORMAL
SL CV REST NUCLEAR ISOTOPE DOSE: 9.6 MCI
SL CV STRESS NUCLEAR ISOTOPE DOSE: 29.6 MCI
SL CV STRESS RECOVERY BP: NORMAL MMHG
SL CV STRESS RECOVERY HR: 93 BPM
SL CV STRESS RECOVERY O2 SAT: 100 %
SPECT HRT GATED+EF W RNC IV: 55 %
STRESS ANGINA INDEX: 0
STRESS BASELINE BP: NORMAL MMHG
STRESS BASELINE HR: 72 BPM
STRESS O2 SAT REST: 97 %
STRESS PEAK HR: 101 BPM
STRESS POST EXERCISE DUR MIN: 3 MIN
STRESS POST EXERCISE DUR SEC: 0 SEC
STRESS POST O2 SAT PEAK: 98 %
STRESS POST PEAK BP: 143 MMHG
STRESS POST PEAK HR: 102 BPM
STRESS POST PEAK SYSTOLIC BP: 155 MMHG
STRESS/REST PERFUSION RATIO: 0.98
TARGET HR FORMULA: NORMAL
TEST INDICATION: NORMAL

## 2025-07-01 PROCEDURE — 71275 CT ANGIOGRAPHY CHEST: CPT

## 2025-07-01 PROCEDURE — 93016 CV STRESS TEST SUPVJ ONLY: CPT | Performed by: INTERNAL MEDICINE

## 2025-07-01 PROCEDURE — 78452 HT MUSCLE IMAGE SPECT MULT: CPT | Performed by: INTERNAL MEDICINE

## 2025-07-01 PROCEDURE — A9502 TC99M TETROFOSMIN: HCPCS

## 2025-07-01 PROCEDURE — 93018 CV STRESS TEST I&R ONLY: CPT | Performed by: INTERNAL MEDICINE

## 2025-07-01 PROCEDURE — 99214 OFFICE O/P EST MOD 30 MIN: CPT | Performed by: INTERNAL MEDICINE

## 2025-07-01 PROCEDURE — 99239 HOSP IP/OBS DSCHRG MGMT >30: CPT | Performed by: PHYSICIAN ASSISTANT

## 2025-07-01 PROCEDURE — 93017 CV STRESS TEST TRACING ONLY: CPT

## 2025-07-01 PROCEDURE — 78452 HT MUSCLE IMAGE SPECT MULT: CPT

## 2025-07-01 RX ORDER — ACETAMINOPHEN 500 MG
500 TABLET ORAL EVERY 6 HOURS PRN
Start: 2025-07-01

## 2025-07-01 RX ORDER — REGADENOSON 0.08 MG/ML
0.4 INJECTION, SOLUTION INTRAVENOUS ONCE
Status: COMPLETED | OUTPATIENT
Start: 2025-07-01 | End: 2025-07-01

## 2025-07-01 RX ADMIN — LEVOTHYROXINE SODIUM 175 MCG: 175 TABLET ORAL at 05:00

## 2025-07-01 RX ADMIN — LOSARTAN POTASSIUM 12.5 MG: 25 TABLET, FILM COATED ORAL at 08:18

## 2025-07-01 RX ADMIN — IOHEXOL 85 ML: 350 INJECTION, SOLUTION INTRAVENOUS at 13:22

## 2025-07-01 RX ADMIN — PANTOPRAZOLE SODIUM 40 MG: 40 TABLET, DELAYED RELEASE ORAL at 05:00

## 2025-07-01 RX ADMIN — REGADENOSON 0.4 MG: 0.08 INJECTION, SOLUTION INTRAVENOUS at 10:16

## 2025-07-01 NOTE — PLAN OF CARE
Problem: PAIN - ADULT  Goal: Verbalizes/displays adequate comfort level or baseline comfort level  Description: Interventions:  - Encourage patient to monitor pain and request assistance  - Assess pain using appropriate pain scale  - Administer analgesics as ordered based on type and severity of pain and evaluate response  - Implement non-pharmacological measures as appropriate and evaluate response  - Consider cultural and social influences on pain and pain management  - Notify physician/advanced practitioner if interventions unsuccessful or patient reports new pain  - Educate patient/family on pain management process including their role and importance of  reporting pain   - Provide non-pharmacologic/complimentary pain relief interventions  Outcome: Progressing     Problem: INFECTION - ADULT  Goal: Absence or prevention of progression during hospitalization  Description: INTERVENTIONS:  - Assess and monitor for signs and symptoms of infection  - Monitor lab/diagnostic results  - Monitor all insertion sites, i.e. indwelling lines, tubes, and drains  - Monitor endotracheal if appropriate and nasal secretions for changes in amount and color  - [place] appropriate cooling/warming therapies per order  - Administer medications as ordered  - Instruct and encourage patient and family to use good hand hygiene technique  - Identify and instruct in appropriate isolation precautions for identified infection/condition  Outcome: Progressing  Goal: Absence of fever/infection during neutropenic period  Description: INTERVENTIONS:  - Monitor WBC  - Perform strict hand hygiene  - Limit to healthy visitors only  - No plants, dried, fresh or silk flowers with bedolla in patient room  Outcome: Progressing     Problem: SAFETY ADULT  Goal: Patient will remain free of falls  Description: INTERVENTIONS:  - Educate patient/family on patient safety including physical limitations  - Instruct patient to call for assistance with activity   -  Consider consulting OT/PT to assist with strengthening/mobility based on AM PAC & JH-HLM score  - Consult OT/PT to assist with strengthening/mobility   - Keep Call bell within reach  - Keep bed low and locked with side rails adjusted as appropriate  - Keep care items and personal belongings within reach  - Initiate and maintain comfort rounds  - Make Fall Risk Sign visible to staff  - Offer Toileting every 2 Hours, in advance of need  - Initiate/Maintain bed alarm  - Obtain necessary fall risk management equipment  - Apply yellow socks and bracelet for high fall risk patients  - Consider moving patient to room near nurses station  Outcome: Progressing  Goal: Maintain or return to baseline ADL function  Description: INTERVENTIONS:  -  Assess patient's ability to carry out ADLs; assess patient's baseline for ADL function and identify physical deficits which impact ability to perform ADLs (bathing, care of mouth/teeth, toileting, grooming, dressing, etc.)  - Assess/evaluate cause of self-care deficits   - Assess range of motion  - Assess patient's mobility; develop plan if impaired  - Assess patient's need for assistive devices and provide as appropriate  - Encourage maximum independence but intervene and supervise when necessary  - Involve family in performance of ADLs  - Assess for home care needs following discharge   - Consider OT consult to assist with ADL evaluation and planning for discharge  - Provide patient education as appropriate  - Monitor functional capacity and physical performance, use of AM PAC & JH-HLM   - Monitor gait, balance and fatigue with ambulation    Outcome: Progressing  Goal: Maintains/Returns to pre admission functional level  Description: INTERVENTIONS:  - Perform AM-PAC 6 Click Basic Mobility/ Daily Activity assessment daily.  - Set and communicate daily mobility goal to care team and patient/family/caregiver.   - Collaborate with rehabilitation services on mobility goals if consulted  -  Out of bed to chair 3 times a day   - Out of bed for meals 3 times a day  - Out of bed for toileting  - Record patient progress and toleration of activity level   Outcome: Progressing     Problem: DISCHARGE PLANNING  Goal: Discharge to home or other facility with appropriate resources  Description: INTERVENTIONS:  - Identify barriers to discharge w/patient and caregiver  - Arrange for needed discharge resources and transportation as appropriate  - Identify discharge learning needs (meds, wound care, etc.)  - Arrange for interpretive services to assist at discharge as needed  - Refer to Case Management Department for coordinating discharge planning if the patient needs post-hospital services based on physician/advanced practitioner order or complex needs related to functional status, cognitive ability, or social support system  Outcome: Progressing     Problem: Knowledge Deficit  Goal: Patient/family/caregiver demonstrates understanding of disease process, treatment plan, medications, and discharge instructions  Description: Complete learning assessment and assess knowledge base.  Interventions:  - Provide teaching at level of understanding  - Provide teaching via preferred learning methods  Outcome: Progressing     Problem: CARDIOVASCULAR - ADULT  Goal: Maintains optimal cardiac output and hemodynamic stability  Description: INTERVENTIONS:  - Monitor I/O, vital signs and rhythm  - Monitor for S/S and trends of decreased cardiac output  - Administer and titrate ordered vasoactive medications to optimize hemodynamic stability  - Assess quality of pulses, skin color and temperature  - Assess for signs of decreased coronary artery perfusion  - Instruct patient to report change in severity of symptoms  Outcome: Progressing  Goal: Absence of cardiac dysrhythmias or at baseline rhythm  Description: INTERVENTIONS:  - Continuous cardiac monitoring, vital signs, obtain 12 lead EKG if ordered  - Administer antiarrhythmic and  heart rate control medications as ordered  - Monitor electrolytes and administer replacement therapy as ordered  Outcome: Progressing

## 2025-07-01 NOTE — ASSESSMENT & PLAN NOTE
Patient presented in May for syncopal event and was seen by cardiology   Medtronic dual chamber PPM placed for high degree AV block  Interrogation- no events. Atrial unipolar lead impedance warning 6/28 (cardiology reach out to rep to assure no reprogramming needed)

## 2025-07-01 NOTE — PROGRESS NOTES
"Cardiology Progress Note - Leeroy Santos 80 y.o. male MRN: [id number]    Unit/Bed#: S -01 Encounter: [id number]        Assessment & Plan  Chest pain  Prior to getting OOB yesterday, he felt left sided chest discomfort \" over my pacemaker\". It began to radiate across chest, arms and both sides of neck. Worse with deep breath. There was diaphoresis and lightheadedness.  Eventually called EMS- given 2 SLG NTG no relief. After few hours was complete relief in ER. No medications given.   No known CAD. No exertional symptoms.   0-hour troponin 5 with 2-hour troponin of 6. Will check 4 hours.   ECG AV paced  TTE 5/13/2025 normal LV function.  Mild aortic stenosis.  Mild much regurgitation.  He is now painfree.   Nuclear stress test remains normal without significant perfusion abnormality suggesting ischemic disease.  Pacemaker was interrogated, no significant events noted  Currently chest pain-free and doing well  Able from cardiac standpoint for discharge home today with outpatient follow-up.  HTN (hypertension)  Blood pressure well-controlled.  He takes losartan 25mg  history of SAH (subarachnoid hemorrhage) (HCC)  Presented to the hospital 5/12/2020 2:25 episodes of syncope.  Small SAH.  No surgical intervention.  Follow-up CT to head showed stable SAH.  That admission noted to have recurrent high degree AV block with ventricular standstill was seen on telemetry requiring epinephrine drip and TVP with eventual permanent pacemaker.  No recurrent syncope.   Pacemaker  Medtronic dual chamber PPM for high degree AV block  Interrogation- no events. Atrial unipolar lead impedance warning 6/28, which is of no clinical consequence    Subjective:   Patient seen and examined.  No significant events overnight.  ; pertinent negatives - chest pain, chest pressure/discomfort, dyspnea, irregular heart beat, and palpitations.    Objective:     Vitals: Blood pressure (!) 172/79, pulse 68, temperature 98.2 °F (36.8 °C), resp. rate " "20, height 5' 8\" (1.727 m), weight 73.9 kg (163 lb), SpO2 98%., Body mass index is 24.78 kg/m².,   Orthostatic Blood Pressures      Flowsheet Row Most Recent Value   Blood Pressure 172/79 filed at 07/01/2025 1132   Patient Position - Orthostatic VS Sitting filed at 06/30/2025 0825              Intake/Output Summary (Last 24 hours) at 7/1/2025 1240  Last data filed at 7/1/2025 0830  Gross per 24 hour   Intake 270 ml   Output --   Net 270 ml       TELE: No significant arrhythmias seen.    Physical Exam:    GEN: Leeroy Santos appears well, alert and oriented x 3, pleasant and cooperative   HEENT: pupils equal, round, and reactive to light; extraocular muscles intact  NECK: supple, no carotid bruits   HEART: regular rhythm, normal S1 and S2, no murmurs, clicks, gallops or rubs   LUNGS: clear to auscultation bilaterally; no wheezes, rales, or rhonchi   ABDOMEN: normal bowel sounds, soft, no tenderness, no distention  EXTREMITIES: peripheral pulses normal; no clubbing, cyanosis, or edema  NEURO: no focal findings   SKIN: normal without suspicious lesions on exposed skin    Medications:    Current Medications[1]     Labs & Results:        Results from last 7 days   Lab Units 06/30/25  0834   WBC Thousand/uL 11.66*   HEMOGLOBIN g/dL 12.8   HEMATOCRIT % 39.6   PLATELETS Thousands/uL 299         Results from last 7 days   Lab Units 06/30/25  2145 06/30/25  0834   POTASSIUM mmol/L 4.4 4.0   CHLORIDE mmol/L 107 108   CO2 mmol/L 26 28   BUN mg/dL 17 19   CREATININE mg/dL 0.91 0.84   CALCIUM mg/dL 9.1 9.0   ALK PHOS U/L  --  41   ALT U/L  --  11   AST U/L  --  15         Results from last 7 days   Lab Units 06/30/25  2145   MAGNESIUM mg/dL 1.9       Nuclear stress test: personally reviewed -normal ejection fraction, no significant perfusion defects of concern suggesting ischemic disease    EKG personally reviewed by Keanu Buck MD.          [1]   Current Facility-Administered Medications:     atorvastatin (LIPITOR) tablet " 20 mg, 20 mg, Oral, HS, Kavya Jerry MD, 20 mg at 06/30/25 2102    calcium carbonate (TUMS) chewable tablet 500 mg, 500 mg, Oral, TID PRN, Kavya Jerry MD    levothyroxine tablet 175 mcg, 175 mcg, Oral, Early Morning, Kavya Jerry MD, 175 mcg at 07/01/25 0500    losartan (COZAAR) tablet 12.5 mg, 12.5 mg, Oral, QAM, Kavya Jerry MD, 12.5 mg at 07/01/25 0818    pantoprazole (PROTONIX) EC tablet 40 mg, 40 mg, Oral, Early Morning, Kavya Jerry MD, 40 mg at 07/01/25 0500

## 2025-07-01 NOTE — ASSESSMENT & PLAN NOTE
"Prior to getting OOB yesterday, he felt left sided chest discomfort \" over my pacemaker\". It began to radiate across chest, arms and both sides of neck. Worse with deep breath. There was diaphoresis and lightheadedness.  Eventually called EMS- given 2 SLG NTG no relief. After few hours was complete relief in ER. No medications given.   No known CAD. No exertional symptoms.   0-hour troponin 5 with 2-hour troponin of 6. Will check 4 hours.   ECG AV paced  TTE 5/13/2025 normal LV function.  Mild aortic stenosis.  Mild much regurgitation.  He is now painfree.   Nuclear stress test remains normal without significant perfusion abnormality suggesting ischemic disease.  Pacemaker was interrogated, no significant events noted  Currently chest pain-free and doing well  Able from cardiac standpoint for discharge home today with outpatient follow-up.  "

## 2025-07-01 NOTE — ASSESSMENT & PLAN NOTE
Medtronic dual chamber PPM for high degree AV block  Interrogation- no events. Atrial unipolar lead impedance warning 6/28, which is of no clinical consequence

## 2025-07-01 NOTE — ASSESSMENT & PLAN NOTE
Patient noted chest pain that started at 5 AM this morning when he woke up, radiated to his arm and jaw associated with diaphoresis.  Pain was ongoing until some hours later when he received nitroglycerin in the ambulance.  Troponin negative x 3  EKG is paced no clear evidence of ischemia  Cardiology consult noted  Pharm nuclear stress test normal  2D echocardiogram performed in May 2025 showed normal EF 60% grade 1 diastolic dysfunction and no significant valvular issues  Chest x-ray unremarkable  D-dimer elevated 1.31, CTA chest negative

## 2025-07-01 NOTE — ASSESSMENT & PLAN NOTE
Recently noted to have trace subarachnoid hemorrhage on CT scan in May when he was admitted for syncope/fall.

## 2025-07-01 NOTE — DISCHARGE SUMMARY
Discharge Summary - Hospitalist   Name: Leeroy Santos 80 y.o. male I MRN: [id number]  Unit/Bed#: S -01 I Date of Admission: 6/30/2025   Date of Service: 7/1/2025 I Hospital Day: 0     Assessment & Plan  Chest pain  Patient noted chest pain that started at 5 AM this morning when he woke up, radiated to his arm and jaw associated with diaphoresis.  Pain was ongoing until some hours later when he received nitroglycerin in the ambulance.  Troponin negative x 3  EKG is paced no clear evidence of ischemia  Cardiology consult noted  Pharm nuclear stress test normal  2D echocardiogram performed in May 2025 showed normal EF 60% grade 1 diastolic dysfunction and no significant valvular issues  Chest x-ray unremarkable  D-dimer elevated 1.31, CTA chest negative  HTN (hypertension)  Continue losartan, may need to increase dose as an outpatient    history of SAH (subarachnoid hemorrhage) ([facility])  Recently noted to have trace subarachnoid hemorrhage on CT scan in May when he was admitted for syncope/fall.    Pacemaker  Patient presented in May for syncopal event and was seen by cardiology   Medtronic dual chamber PPM placed for high degree AV block  Interrogation- no events. Atrial unipolar lead impedance warning 6/28 (cardiology reach out to rep to assure no reprogramming needed)     Medical Problems       Resolved Problems  Date Reviewed: 7/1/2025   None       Discharging Physician / Practitioner: Annelise Perry PA-C  PCP: Emigdio Mcclain MD  Admission Date:   Admission Orders (From admission, onward)       Ordered        06/30/25 0937  Place in Observation  Once                          Discharge Date: 07/01/25    Next Steps for Physician/AP Assuming Care:  Please recheck blood pressure and discuss whether antihypertensive regimen needs to be augmented     Test Results Pending at Discharge (will require follow up):  none    Medication Changes for Discharge & Rationale:   No change  See after visit summary for  "reconciled discharge medications provided to patient and/or family.     Consultations During Hospital Stay:  Cardiology    Procedures Performed:   Nuclear stress test, normal   CTA of the chest no PE    Significant Findings / Test Results:   As above    Incidental Findings:       Hospital Course:   Leeroy Santos is a 80 y.o. male patient with underlying history of recent pacemaker placement who originally presented to the hospital on 6/30/2025 due to chest pain.  Serial troponins were negative as was chest x-ray.  D-dimer was slightly elevated at 1.31.  He was kept overnight and seen by cardiology.  He underwent pharmacological stress test which was normal.  CTA of the chest was done due to the elevated D-dimer and was also normal.  Patient was recommended to stay on his same medications by cardiology.  Given his intermittent elevations in blood pressure I would highly recommend that he follow-up with PCP for repeat blood pressure monitoring.      Please see above list of diagnoses and related plan for additional information.     Discharge Day Visit / Exam:   Subjective: Upon my evaluation earlier patient was not experiencing any chest pain or shortness of breath.  Had mild tenderness/discomfort with certain palpation/position but feeling better than yesterday.  Did admit to me that he started doing some gentle workouts/weight lifting but not heavy about a week ago after not working out following his pacemaker  Vitals: Blood Pressure: (!) 172/79 (07/01/25 1132)  Pulse: 68 (07/01/25 1132)  Temperature: 98.2 °F (36.8 °C) (07/01/25 1132)  Temp Source: Oral (06/30/25 2200)  Respirations: 20 (06/30/25 0825)  Height: 5' 8\" (172.7 cm) (07/01/25 1011)  Weight - Scale: 73.9 kg (163 lb) (07/01/25 1011)  SpO2: 98 % (07/01/25 1132)  Physical Exam  Vitals reviewed.   Constitutional:       General: He is not in acute distress.     Appearance: Normal appearance. He is not ill-appearing, toxic-appearing or diaphoretic.   HENT:    "   Nose: No congestion or rhinorrhea.     Eyes:      General: No scleral icterus.        Right eye: No discharge.         Left eye: No discharge.      Conjunctiva/sclera: Conjunctivae normal.       Cardiovascular:      Rate and Rhythm: Normal rate and regular rhythm.      Heart sounds: No murmur heard.  Pulmonary:      Effort: No respiratory distress.      Breath sounds: No stridor. No wheezing, rhonchi or rales.   Chest:      Chest wall: Tenderness (Mild lateral chest discomfort to palpation) present.   Abdominal:      General: There is no distension.      Tenderness: There is no abdominal tenderness. There is no guarding.     Musculoskeletal:      Right lower leg: No edema.      Left lower leg: No edema.     Skin:     Coloration: Skin is not jaundiced or pale.      Findings: No bruising, erythema, lesion or rash.     Neurological:      General: No focal deficit present.      Mental Status: He is alert. Mental status is at baseline.     Psychiatric:         Mood and Affect: Mood normal.          Discussion with Family: Attempted to update  (wife) via phone. Left voicemail.     Discharge instructions/Information to patient and family:   See after visit summary for information provided to patient and family.      Provisions for Follow-Up Care:  See after visit summary for information related to follow-up care and any pertinent home health orders.      Mobility at time of Discharge:   Basic Mobility Inpatient Raw Score: 24  JH-HLM Goal: 8: Walk 250 feet or more  JH-HLM Achieved: 7: Walk 25 feet or more  HLM Goal achieved. Continue to encourage appropriate mobility.     Disposition:   Home    Planned Readmission: none    Administrative Statements   Discharge Statement:  I have spent a total time of 40 minutes in caring for this patient on the day of the visit/encounter.  Case was discussed with cardiology and nursing.    **Please Note: This note may have been constructed using a voice recognition  system**

## 2025-07-01 NOTE — DISCHARGE INSTR - AVS FIRST PAGE
You can take Tylenol as needed for mild pain which is likely to be musculoskeletal.  Avoid any heavy lifting pushing or pulling.  Walking is okay      Please recheck your blood pressure at home several times this week at different times of day and call your family doctor with the readings.  You may need to have your blood pressure medicine increased

## 2025-07-03 ENCOUNTER — TELEPHONE (OUTPATIENT)
Dept: CARDIOLOGY CLINIC | Facility: CLINIC | Age: 80
End: 2025-07-03

## 2025-07-03 NOTE — TELEPHONE ENCOUNTER
LVM for patient to schedule hospital follow up, as follows:    Please call tomorrow for hospital f/u 2 weeks- [facility] s/p PPM, atypical cp.   Thanks   tosin

## 2025-07-15 ENCOUNTER — OFFICE VISIT (OUTPATIENT)
Dept: CARDIOLOGY CLINIC | Facility: CLINIC | Age: 80
End: 2025-07-15
Payer: COMMERCIAL

## 2025-07-15 VITALS
HEART RATE: 80 BPM | DIASTOLIC BLOOD PRESSURE: 70 MMHG | OXYGEN SATURATION: 96 % | HEIGHT: 68 IN | SYSTOLIC BLOOD PRESSURE: 140 MMHG | WEIGHT: 165.1 LBS | BODY MASS INDEX: 25.02 KG/M2

## 2025-07-15 DIAGNOSIS — I45.5 SINUS PAUSE: ICD-10-CM

## 2025-07-15 DIAGNOSIS — Z95.0 PACEMAKER: ICD-10-CM

## 2025-07-15 DIAGNOSIS — R07.9 CHEST PAIN, UNSPECIFIED TYPE: ICD-10-CM

## 2025-07-15 DIAGNOSIS — I10 PRIMARY HYPERTENSION: Primary | ICD-10-CM

## 2025-07-15 PROCEDURE — 99214 OFFICE O/P EST MOD 30 MIN: CPT

## (undated) DEVICE — INTRO SHEATH PEEL AWAY 7FR

## (undated) DEVICE — DGW .035 FC J3MM 150CM TEF: Brand: EMERALD

## (undated) DEVICE — GUIDE SHEATH 7FR 90 D ATTAIN SELECT II SUREVALVE

## (undated) DEVICE — LEAD 457453 MRI US BI RCMCRD MVC
Type: IMPLANTABLE DEVICE | Site: HEART | Status: NON-FUNCTIONAL
Brand: CAPSURE SENSE MRI™ SURESCAN™
Removed: 2025-05-14